# Patient Record
Sex: MALE | Race: WHITE | NOT HISPANIC OR LATINO | Employment: FULL TIME | URBAN - METROPOLITAN AREA
[De-identification: names, ages, dates, MRNs, and addresses within clinical notes are randomized per-mention and may not be internally consistent; named-entity substitution may affect disease eponyms.]

---

## 2023-02-16 PROBLEM — J34.2 NASAL SEPTAL DEVIATION: Status: ACTIVE | Noted: 2023-02-16

## 2023-02-16 PROBLEM — K59.09 CHRONIC CONSTIPATION: Status: ACTIVE | Noted: 2023-02-16

## 2023-02-16 PROBLEM — F33.0 MILD EPISODE OF RECURRENT MAJOR DEPRESSIVE DISORDER (CMS-HCC): Status: ACTIVE | Noted: 2023-02-16

## 2023-02-16 PROBLEM — E53.8 VITAMIN B12 DEFICIENCY: Status: ACTIVE | Noted: 2023-02-16

## 2023-02-16 PROBLEM — R07.89 ATYPICAL CHEST PAIN: Status: ACTIVE | Noted: 2023-02-16

## 2023-02-16 PROBLEM — R39.14 BENIGN PROSTATIC HYPERPLASIA WITH INCOMPLETE BLADDER EMPTYING: Status: ACTIVE | Noted: 2023-02-16

## 2023-02-16 PROBLEM — M25.561 RIGHT KNEE PAIN: Status: ACTIVE | Noted: 2023-02-16

## 2023-02-16 PROBLEM — G47.31 COMPLEX SLEEP APNEA SYNDROME: Status: ACTIVE | Noted: 2023-02-16

## 2023-02-16 PROBLEM — I10 BENIGN ESSENTIAL HYPERTENSION: Status: ACTIVE | Noted: 2023-02-16

## 2023-02-16 PROBLEM — I25.10 CORONARY ARTERY CALCIFICATION SEEN ON CAT SCAN: Status: ACTIVE | Noted: 2023-02-16

## 2023-02-16 PROBLEM — I71.21 ANEURYSM OF ASCENDING AORTA (CMS-HCC): Status: ACTIVE | Noted: 2023-02-16

## 2023-02-16 PROBLEM — G47.39 COMPLEX SLEEP APNEA SYNDROME: Status: ACTIVE | Noted: 2023-02-16

## 2023-02-16 PROBLEM — G47.33 OBSTRUCTIVE SLEEP APNEA: Status: ACTIVE | Noted: 2023-02-16

## 2023-02-16 PROBLEM — K21.9 ESOPHAGEAL REFLUX: Status: ACTIVE | Noted: 2023-02-16

## 2023-02-16 PROBLEM — F41.1 GENERALIZED ANXIETY DISORDER: Status: ACTIVE | Noted: 2023-02-16

## 2023-02-16 PROBLEM — J34.89 NASAL DRYNESS: Status: ACTIVE | Noted: 2023-02-16

## 2023-02-16 PROBLEM — R93.89 ABNORMAL CHEST XRAY: Status: ACTIVE | Noted: 2023-02-16

## 2023-02-16 PROBLEM — M95.4 ACQUIRED DEFORMITY OF CHEST AND RIB: Status: ACTIVE | Noted: 2023-02-16

## 2023-02-16 PROBLEM — J30.9 ALLERGIC RHINITIS: Status: ACTIVE | Noted: 2023-02-16

## 2023-02-16 PROBLEM — K13.79 UVULAR SWELLING: Status: ACTIVE | Noted: 2023-02-16

## 2023-02-16 PROBLEM — E55.9 VITAMIN D DEFICIENCY: Status: ACTIVE | Noted: 2023-02-16

## 2023-02-16 PROBLEM — E78.00 ELEVATED LDL CHOLESTEROL LEVEL: Status: ACTIVE | Noted: 2023-02-16

## 2023-02-16 PROBLEM — E03.9 HYPOTHYROIDISM: Status: ACTIVE | Noted: 2023-02-16

## 2023-02-16 PROBLEM — Z77.090 ASBESTOS EXPOSURE: Status: ACTIVE | Noted: 2023-02-16

## 2023-02-16 PROBLEM — R00.2 PALPITATIONS: Status: ACTIVE | Noted: 2023-02-16

## 2023-02-16 PROBLEM — T88.7XXA MEDICATION SIDE EFFECT: Status: ACTIVE | Noted: 2023-02-16

## 2023-02-16 PROBLEM — N40.1 BENIGN PROSTATIC HYPERPLASIA WITH INCOMPLETE BLADDER EMPTYING: Status: ACTIVE | Noted: 2023-02-16

## 2023-02-16 PROBLEM — R91.1 INCIDENTAL LUNG NODULE, > 3MM AND < 8MM: Status: ACTIVE | Noted: 2023-02-16

## 2023-02-16 PROBLEM — K76.0 FATTY LIVER: Status: ACTIVE | Noted: 2023-02-16

## 2023-02-16 PROBLEM — R06.02 SOBOE (SHORTNESS OF BREATH ON EXERTION): Status: ACTIVE | Noted: 2023-02-16

## 2023-02-16 PROBLEM — E66.3 OVERWEIGHT: Status: ACTIVE | Noted: 2023-02-16

## 2023-02-16 PROBLEM — K82.4 GALLBLADDER POLYP: Status: ACTIVE | Noted: 2023-02-16

## 2023-02-16 PROBLEM — J45.909 REACTIVE AIRWAY DISEASE (HHS-HCC): Status: ACTIVE | Noted: 2023-02-16

## 2023-02-16 PROBLEM — M25.861 MASS OF JOINT OF RIGHT KNEE: Status: ACTIVE | Noted: 2023-02-16

## 2023-02-16 PROBLEM — R53.83 FATIGUE: Status: ACTIVE | Noted: 2023-02-16

## 2023-02-16 PROBLEM — Z15.89 HOMOZYGOUS MTHFR MUTATION C677T: Status: ACTIVE | Noted: 2023-02-16

## 2023-02-16 PROBLEM — I49.1 PAC (PREMATURE ATRIAL CONTRACTION): Status: ACTIVE | Noted: 2023-02-16

## 2023-02-16 PROBLEM — M23.92 LOCKING OF LEFT KNEE: Status: ACTIVE | Noted: 2023-02-16

## 2023-02-16 PROBLEM — J32.4 CHRONIC PANSINUSITIS: Status: ACTIVE | Noted: 2023-02-16

## 2023-02-16 RX ORDER — ESOMEPRAZOLE MAGNESIUM 40 MG/1
40 CAPSULE, DELAYED RELEASE ORAL DAILY PRN
COMMUNITY
End: 2023-07-26 | Stop reason: SDUPTHER

## 2023-02-16 RX ORDER — AZELASTINE 1 MG/ML
2 SPRAY, METERED NASAL DAILY
COMMUNITY
Start: 2012-02-23 | End: 2023-06-19 | Stop reason: SDUPTHER

## 2023-02-16 RX ORDER — LANOLIN ALCOHOL/MO/W.PET/CERES
1 CREAM (GRAM) TOPICAL DAILY
COMMUNITY
Start: 2012-11-21

## 2023-02-16 RX ORDER — HYDROCORTISONE ACETATE 25 MG/1
SUPPOSITORY RECTAL
COMMUNITY
Start: 2012-09-11 | End: 2023-03-29 | Stop reason: SDUPTHER

## 2023-02-16 RX ORDER — TALC
250 POWDER (GRAM) TOPICAL 2 TIMES DAILY
COMMUNITY
Start: 2021-05-04 | End: 2023-03-13 | Stop reason: SDUPTHER

## 2023-02-16 RX ORDER — POLYETHYLENE GLYCOL 3350 17 G/17G
POWDER, FOR SOLUTION ORAL
COMMUNITY
Start: 2021-05-04

## 2023-02-16 RX ORDER — BUPROPION HYDROCHLORIDE 150 MG/1
150 TABLET ORAL
COMMUNITY
Start: 2021-08-10 | End: 2023-06-30 | Stop reason: SDUPTHER

## 2023-02-16 RX ORDER — MULTIVITAMIN
1 TABLET ORAL DAILY
COMMUNITY
Start: 2012-05-11

## 2023-02-16 RX ORDER — FERROUS SULFATE 325(65) MG
TABLET ORAL
COMMUNITY
Start: 2020-02-05

## 2023-02-16 RX ORDER — LEVOTHYROXINE SODIUM 75 UG/1
1 TABLET ORAL DAILY
COMMUNITY
Start: 2012-01-05 | End: 2023-12-11 | Stop reason: SDUPTHER

## 2023-02-16 RX ORDER — ROSUVASTATIN CALCIUM 10 MG/1
1 TABLET, COATED ORAL DAILY
COMMUNITY
Start: 2022-07-26 | End: 2023-11-28 | Stop reason: SDUPTHER

## 2023-02-16 RX ORDER — ESCITALOPRAM OXALATE 10 MG/1
5 TABLET ORAL DAILY
COMMUNITY
Start: 2020-12-28 | End: 2023-03-29 | Stop reason: SDUPTHER

## 2023-02-16 RX ORDER — UBIQUINOL 100 MG
2 CAPSULE ORAL DAILY
COMMUNITY

## 2023-02-16 RX ORDER — RAMIPRIL 10 MG/1
1 CAPSULE ORAL DAILY
COMMUNITY
Start: 2022-06-02 | End: 2023-03-13 | Stop reason: SDUPTHER

## 2023-02-16 RX ORDER — ACETAMINOPHEN 500 MG
50 TABLET ORAL DAILY
COMMUNITY
Start: 2012-11-21

## 2023-02-16 RX ORDER — TAMSULOSIN HYDROCHLORIDE 0.4 MG/1
0.4 CAPSULE ORAL 2 TIMES DAILY
COMMUNITY
End: 2023-06-09 | Stop reason: SDUPTHER

## 2023-02-16 RX ORDER — ALBUTEROL SULFATE 90 UG/1
AEROSOL, METERED RESPIRATORY (INHALATION)
COMMUNITY
Start: 2015-09-24 | End: 2023-06-30 | Stop reason: SDUPTHER

## 2023-02-16 RX ORDER — LORAZEPAM 0.5 MG/1
0.5 TABLET ORAL
COMMUNITY
Start: 2018-12-10

## 2023-02-17 PROBLEM — K46.9 HERNIA, ABDOMINAL: Status: ACTIVE | Noted: 2023-02-17

## 2023-02-17 PROBLEM — D12.6 TUBULAR ADENOMA OF COLON: Status: ACTIVE | Noted: 2023-02-17

## 2023-02-17 PROBLEM — R10.11 COLICKY RUQ ABDOMINAL PAIN: Status: ACTIVE | Noted: 2023-02-17

## 2023-02-17 PROBLEM — E66.811 CLASS 1 OBESITY WITH ALVEOLAR HYPOVENTILATION AND BODY MASS INDEX (BMI) OF 31.0 TO 31.9 IN ADULT: Status: ACTIVE | Noted: 2023-02-17

## 2023-02-17 PROBLEM — E66.2 CLASS 1 OBESITY WITH ALVEOLAR HYPOVENTILATION AND BODY MASS INDEX (BMI) OF 31.0 TO 31.9 IN ADULT (MULTI): Status: ACTIVE | Noted: 2023-02-17

## 2023-02-17 PROBLEM — Z86.69 HISTORY OF MIGRAINE HEADACHES: Status: ACTIVE | Noted: 2023-02-17

## 2023-02-17 PROBLEM — K64.9 BLEEDING HEMORRHOIDS: Status: ACTIVE | Noted: 2023-02-17

## 2023-02-17 PROBLEM — Z86.16 HISTORY OF SEVERE ACUTE RESPIRATORY SYNDROME CORONAVIRUS 2 (SARS-COV-2) DISEASE: Status: ACTIVE | Noted: 2023-02-17

## 2023-02-17 PROBLEM — F10.21 HISTORY OF ALCOHOL DEPENDENCE (MULTI): Status: ACTIVE | Noted: 2023-02-17

## 2023-03-01 LAB
ALANINE AMINOTRANSFERASE (SGPT) (U/L) IN SER/PLAS: 27 U/L (ref 10–52)
ASPARTATE AMINOTRANSFERASE (SGOT) (U/L) IN SER/PLAS: 21 U/L (ref 9–39)
CHOLESTEROL (MG/DL) IN SER/PLAS: 230 MG/DL (ref 0–199)
CHOLESTEROL IN HDL (MG/DL) IN SER/PLAS: 76.6 MG/DL
CHOLESTEROL/HDL RATIO: 3
LDL: 131 MG/DL (ref 0–99)
TRIGLYCERIDE (MG/DL) IN SER/PLAS: 111 MG/DL (ref 0–149)
VLDL: 22 MG/DL (ref 0–40)

## 2023-03-13 DIAGNOSIS — R00.2 PALPITATIONS: ICD-10-CM

## 2023-03-13 DIAGNOSIS — G47.33 OBSTRUCTIVE SLEEP APNEA: ICD-10-CM

## 2023-03-13 RX ORDER — PRAVASTATIN SODIUM 40 MG/1
1 TABLET ORAL DAILY
COMMUNITY
Start: 2022-09-12 | End: 2023-03-29

## 2023-03-13 RX ORDER — RAMIPRIL 10 MG/1
10 CAPSULE ORAL DAILY
Qty: 90 CAPSULE | Refills: 0 | Status: SHIPPED | OUTPATIENT
Start: 2023-03-13 | End: 2023-07-26 | Stop reason: ALTCHOICE

## 2023-03-13 RX ORDER — TALC
250 POWDER (GRAM) TOPICAL 2 TIMES DAILY
Qty: 90 TABLET | Refills: 0 | Status: SHIPPED | OUTPATIENT
Start: 2023-03-13 | End: 2023-06-19 | Stop reason: SDUPTHER

## 2023-03-13 RX ORDER — FLUTICASONE PROPIONATE 50 MCG
SPRAY, SUSPENSION (ML) NASAL
COMMUNITY
Start: 2023-02-22 | End: 2023-06-19 | Stop reason: SDUPTHER

## 2023-03-29 ENCOUNTER — OFFICE VISIT (OUTPATIENT)
Dept: PRIMARY CARE | Facility: CLINIC | Age: 63
End: 2023-03-29
Payer: COMMERCIAL

## 2023-03-29 VITALS
DIASTOLIC BLOOD PRESSURE: 72 MMHG | SYSTOLIC BLOOD PRESSURE: 118 MMHG | BODY MASS INDEX: 32.2 KG/M2 | WEIGHT: 243 LBS | HEIGHT: 73 IN | HEART RATE: 68 BPM | RESPIRATION RATE: 18 BRPM

## 2023-03-29 DIAGNOSIS — Z79.899 NEW MEDICATION ADDED: ICD-10-CM

## 2023-03-29 DIAGNOSIS — K64.9 BLEEDING HEMORRHOIDS: ICD-10-CM

## 2023-03-29 DIAGNOSIS — J45.30 MILD PERSISTENT REACTIVE AIRWAY DISEASE WITHOUT COMPLICATION (HHS-HCC): Primary | ICD-10-CM

## 2023-03-29 DIAGNOSIS — E78.00 ELEVATED LDL CHOLESTEROL LEVEL: ICD-10-CM

## 2023-03-29 DIAGNOSIS — F33.0 MILD EPISODE OF RECURRENT MAJOR DEPRESSIVE DISORDER (CMS-HCC): ICD-10-CM

## 2023-03-29 DIAGNOSIS — I71.21 ANEURYSM OF ASCENDING AORTA WITHOUT RUPTURE (CMS-HCC): ICD-10-CM

## 2023-03-29 DIAGNOSIS — E78.00 HYPERCHOLESTEROLEMIA: ICD-10-CM

## 2023-03-29 DIAGNOSIS — Z91.148 NOT TAKING MEDICATION AS DIRECTED: ICD-10-CM

## 2023-03-29 DIAGNOSIS — I25.10 CORONARY ARTERY CALCIFICATION SEEN ON CAT SCAN: ICD-10-CM

## 2023-03-29 PROCEDURE — 3074F SYST BP LT 130 MM HG: CPT | Performed by: INTERNAL MEDICINE

## 2023-03-29 PROCEDURE — 3078F DIAST BP <80 MM HG: CPT | Performed by: INTERNAL MEDICINE

## 2023-03-29 PROCEDURE — 1036F TOBACCO NON-USER: CPT | Performed by: INTERNAL MEDICINE

## 2023-03-29 PROCEDURE — 99215 OFFICE O/P EST HI 40 MIN: CPT | Performed by: INTERNAL MEDICINE

## 2023-03-29 PROCEDURE — 94664 DEMO&/EVAL PT USE INHALER: CPT | Performed by: INTERNAL MEDICINE

## 2023-03-29 RX ORDER — FLUTICASONE PROPIONATE 50 UG/1
1 POWDER, METERED RESPIRATORY (INHALATION)
Qty: 60 EACH | Refills: 2 | Status: SHIPPED | OUTPATIENT
Start: 2023-03-29 | End: 2023-07-26 | Stop reason: SDUPTHER

## 2023-03-29 RX ORDER — ESCITALOPRAM OXALATE 5 MG/1
5 TABLET ORAL DAILY
COMMUNITY
End: 2023-06-30 | Stop reason: SDUPTHER

## 2023-03-29 RX ORDER — HYDROCORTISONE ACETATE 25 MG/1
25 SUPPOSITORY RECTAL 2 TIMES DAILY PRN
Qty: 12 SUPPOSITORY | Refills: 1 | Status: SHIPPED | OUTPATIENT
Start: 2023-03-29

## 2023-03-29 ASSESSMENT — PAIN SCALES - GENERAL: PAINLEVEL: 0-NO PAIN

## 2023-03-29 NOTE — PROGRESS NOTES
Subjective   Patient ID: Livan Wen is a 63 y.o. male who presents for Follow-up (Pt here for follow up and refill on suppositories).  LAST VISIT PLAN 3/1/23  Patient Discussion/Summary  MEDICATIONS:  azithromycin: 2 tabs today and then 1 tab daily for 4 days.  benzonatate one every 6-8 hours as needed for cough.  inhaler if you keep coughing, albuterol, follow directions on box  claritin 10mg daily to help settle down the dizziness-be careful about driving and do not get on any ladders until dizziness and illness are resolved  TESTING:today  take antibiotic and cough med and claritin this am -see how you feel and if not dizzy and feeling ok this afternoon you could go to work, otherwise stay home today.  FOLLOW UP WITH DR. ZAMUDIO:  Next visit:keep appt end of the month   Provider Impressions  URI WITH DRY COUGH AND NOW SECONDARY RIGHT OTITIS MEDIA-  SEE PLAN  IF COUGH DOES NOT IMPROVE INTO NEXT WEEK OR WORSENS CONSIDER RE DOSE WITH MEDROL   CAN USE INHALER FROUR COUGH IF NEEDED-   Elevated LDL cholesterol level LABS TODAY  HTN STABLE  UNABLE TO FIND UC NOTE IN [facility] OR EPIC OR COMMUNITY  END INFO FROM PRIOR VISIT    HPI  This appt is originally to follow up on inc rosuvastatin, and labs done 3-1 chol not at goal and inc from prior. It seems he is not taking the rosuvastatin and is still taking pravastatin. Has CAC so want stain on board with ldl at 70 is goal, or below 70.  Also here to f/up on asthmatic bronchitis.    He is feeling well and the bronchitis is resolved.  Some stress at work. Recently  and some better after dealing  with boss. And addressing issues. Company was [place] [employer] and now is [place] [employer].    Cardiac: No new cp. Has his chronic cp that is unchanged,denies  palpitations,increased abebe  Resp: No sob,  wheezing, STILL COUGHING AT NIGHT. AND BRINGS SOME STUFF UP, HE THINKS IT IS HIS CPAP NEEDING CLEANED MORE OFTN.  Extremities: No leg or ankle swelling, no claudication  Neuro-No dizziness,  syncope, blurred vision. No new headaches.    PFTS SMALL AIRWAYS REACTIVE. July 2022. Would  AVOID singulair with depression hx. Consider steroid inhaler. Will start flovent disc, demo'd with him how to use. Then continue albuterol for bronchodilator. Follow up    After discussion, it appears he is taking pravastatin and not the new rosuvastati which may be why no improvement in lipids.He called his spouse from the room to ask.    Mood is fine he states.    He requests refill of his hemorrhoid suppositories, is not having bleeding he states, is more irritation periodically and these help. He is current in his colonoscopy.    Review of Systems    Objective   Lab Results   Component Value Date    WBC 8.5 10/08/2022    HGB 14.4 10/08/2022    HCT 43.2 10/08/2022     10/08/2022    CHOL 230 (H) 03/01/2023    TRIG 111 03/01/2023    HDL 76.6 03/01/2023    ALT 27 03/01/2023    AST 21 03/01/2023     10/08/2022    K 4.3 10/08/2022     (H) 10/08/2022    CREATININE 1.01 10/08/2022    BUN 16 10/08/2022    CO2 28 10/08/2022    TSH 2.59 10/08/2022    PSA 0.44 10/08/2022        LDL  Lipid Panel  Collected: 03/01/23 0838   Result status: Final   Resulting lab: [facility]   Reference range: 0 - 99 mg/dL   Value: 131 High    Comment: .                           NEAR      BORD      AGE      DESIRABLE  OPTIMAL    HIGH     HIGH     VERY HIGH     0-19 Y     0 - 109     ---    110-129   >/= 130     ----    20-24 Y     0 - 119     ---    120-159   >/= 160     ----      >24 Y     0 -  99   100-129  130-159   160-189     >/=190  .   *Additional information available - comment      Contains abnormal data Lipid Panel  Order: [id number]  Status: Final result       Visible to patient: No (inaccessible in [facility])    0 Result Notes       1  Topic             Component Ref Range & Units 4 wk ago  (3/1/23) 5 mo ago  (10/8/22) 1 yr ago  (5/4/21) 2 yr ago  (12/28/20) 3 yr ago  (12/30/19) 3 yr ago  (9/3/19) 4 yr ago  (7/28/18)    Cholesterol 0 - 199 mg/dL 230 High  153   High    High   CM   Comment: .      AGE      DESIRABLE   BORDERLINE HIGH   HIGH     0-19 Y     0 - 169       170 - 199     >/= 200    20-24 Y     0 - 189       190 - 224     >/= 225        >24 Y     0 - 199       200 - 239     >/= 240   **All ranges are based on fasting samples. Specific   therapeutic targets will vary based on patient-specific   cardiac risk.  .   Pediatric guidelines reference:Pediatrics 2011, 128(S5).   Adult guidelines reference: NCEP ATPIII Guidelines,    MARA 2001, 258:2486-97  .   Venipuncture immediately after or during the   administration of Metamizole may lead to falsely   low results. Testing should be performed immediately   prior to Metamizole dosing.   HDL mg/dL 76.6 43.6 CM 56.1 CM 73.6 CM 62.1 CM 57.6 CM 71.4 CM   Comment: .      AGE      VERY LOW   LOW     NORMAL    HIGH       0-19 Y       < 35   < 40     40-45     ----    20-24 Y       ----   < 40       >45     ----      >24 Y       ----   < 40     40-60      >60  .   Cholesterol/HDL Ratio  3.0 3.5 CM 3.5 CM 3.2 CM 2.9 CM 4.1 CM 2.7 CM   Comment: REF VALUES  DESIRABLE  < 3.4  HIGH RISK  > 5.0   LDL 0 - 99 mg/dL 131 High  92  High   High   High   High   High  CM   Comment: .                           NEAR      BORD      AGE      DESIRABLE  OPTIMAL    HIGH     HIGH     VERY HIGH     0-19 Y     0 - 109     ---    110-129   >/= 130     ----    20-24 Y     0 - 119     ---    120-159   >/= 160     ----      >24 Y     0 -  99   100-129  130-159   160-189     >/=190  .   VLDL 0 - 40 mg/dL 22 18 42 High  14 15 22 16   Triglycerides 0 - 149 mg/dL 111 89  High  CM 68 CM 74  CM 79 CM   Comment: .      AGE      DESIRABLE   BORDERLINE HIGH   HIGH     VERY HIGH   0 D-90 D    19 - 174         ----         ----        ----  91 D- 9 Y     0 -  74        75 -  99     >/= 100      ----    10-19 Y     0 -  89        90 - 129     >/= 130   "    ----    20-24 Y     0 - 114       115 - 149     >/= 150      ----        >24 Y     0 - 149       150 - 199    200- 499    >/= 500  .   Venipuncture immediately after or during the   administration of Metamizole may lead to falsely   low results. Testing should be performed immediately   prior to Metamizole dosing.   [facility]  [facility]                  par  Physical ExamBP 118/72 (BP Location: Right arm, Patient Position: Sitting, BP Cuff Size: Adult)   Pulse 68   Resp 18   Ht 1.854 m (6' 1\")   Wt 110 kg (243 lb)   BMI 32.06 kg/m²       6/2/2022     3:41 PM 7/26/2022     3:02 PM 7/26/2022     3:03 PM 8/8/2022    11:40 AM 10/18/2022     9:25 AM 11/30/2022     8:12 AM 3/29/2023     8:47 AM   Vitals   Systolic 131 112 115 129 124 108 118   Diastolic 87 77 84 94 80 74 72   Heart Rate 76 75  83 72 72 68   Temp    35.9 °C (96.7 °F)      Resp    18 18 18 18   Height (in)  1.854 m (6' 1\")  1.854 m (6' 1\") 1.842 m (6' 0.5\")  1.854 m (6' 1\")   Weight (lb)  241.02  243.17 234 242 243   BMI  31.8 kg/m2  32.08 kg/m2 31.3 kg/m2 32.37 kg/m2 32.06 kg/m2   BSA (m2)  2.37 m2  2.38 m2 2.33 m2 2.37 m2 2.38 m2   Visit Report       Report       Patient looks well and is in no obvious distress.  HEENT:   Normocephalic, no facial asymmetry  Eyes: Sclera and conjunctiva are clear.  Neck: No adenopathy cervical (Ant/post/lat), no Supraclavicular nodes.   Thyroid normal.   Carotid pulses normal, no carotid bruits.  Lungs : RR normal. Clear to auscultation anterior, posterior and lateral. No rales, wheezes rhonchi or rubs. Good air exchange. Had a cough at end expiration.  Heart: RRR. No Murmur, gallop, click or rub.  Extremities: no upper extremity edema. No lower extremity edema.   Musculoskeletal: no synovitis of joints seen. No new deformity.  Neuro: CN 2-12 intact. Alert, appropriate.   Ambulates independently.  No gross motor deficit.   No tremors.  Psych: normal mood and affect.  Skin: No " rash, bruising petechiae or jaundice.      Assessment/Plan   Problem List Items Addressed This Visit          Respiratory    Reactive airway disease - Primary    Relevant Medications    fluticasone (Flovent Diskus) 50 mcg/actuation diskus inhaler-pt instructed in use by me. Continue albuterol mdi as well.       Circulatory    Coronary artery calcification seen on CAT scan-change statin to get ldl below 70, asymp.    Bleeding hemorrhoids    Relevant Medications    hydrocortisone (Anusol-HC) 25 mg suppository       Other    Hypercholesterolemia    Relevant Orders    Lipid Panel    Aspartate Aminotransferase    Alanine Aminotransferase    Mild episode of recurrent major depressive disorder (CMS/HCC)-stable on meds, continue same and follow up    Not taking medication as directed--re confusion on statin and which one taking.   Aneurysm ascending aorta, has appt. With dr vargas and will discuss with her/defer to her about changed ace inh to arb. D/w pt.

## 2023-03-29 NOTE — PATIENT INSTRUCTIONS
You are not to be taking pravastatin.  We changed it to rosuvastatin 10 mg once per day.  Your cholesterol is too high, but perhaps this is because you are not taking the rosuvastatin.  Please start the rosuvastatin 10 mg once per day and get a lipid panel in 3 months.    Cough /reactive airways disease: Flovent disc: one inhalation twice per day, rinse mouth after use.  Use the albuterol inhaler that you are currently using twice per day or as needed-depends on how much you cough.  Stay on the Flovent for 3 months and then will plan to stop and see if you need it any longer.    Follow up July-fasting blood test right before that appt. To check on rosuvastatin.    Discuss with Dr Cota about if she would suggest changing ramipril to a different bp med to help protect aorta over time.

## 2023-04-02 PROBLEM — Z91.148 NOT TAKING MEDICATION AS DIRECTED: Status: ACTIVE | Noted: 2023-04-02

## 2023-04-24 ENCOUNTER — TELEPHONE (OUTPATIENT)
Dept: PRIMARY CARE | Facility: CLINIC | Age: 63
End: 2023-04-24
Payer: COMMERCIAL

## 2023-04-24 NOTE — TELEPHONE ENCOUNTER
----- Message from Sonia Nguyễn MD sent at 4/21/2023  6:41 PM EDT -----  Let Livan know the lung nodule was stable. His aneurysm is there (thoracic ) and stable at 4.5 cm. He does not need further testing on the lung nodule. He does need the aneurysm followed but he knows this.

## 2023-06-09 DIAGNOSIS — R39.14 BENIGN PROSTATIC HYPERPLASIA WITH INCOMPLETE BLADDER EMPTYING: Primary | ICD-10-CM

## 2023-06-09 DIAGNOSIS — N40.1 BENIGN PROSTATIC HYPERPLASIA WITH INCOMPLETE BLADDER EMPTYING: Primary | ICD-10-CM

## 2023-06-09 RX ORDER — TAMSULOSIN HYDROCHLORIDE 0.4 MG/1
0.4 CAPSULE ORAL 2 TIMES DAILY
Qty: 180 CAPSULE | Refills: 1 | Status: SHIPPED | OUTPATIENT
Start: 2023-06-09 | End: 2024-02-21 | Stop reason: SDUPTHER

## 2023-06-19 DIAGNOSIS — G47.33 OBSTRUCTIVE SLEEP APNEA: ICD-10-CM

## 2023-06-19 RX ORDER — TALC
250 POWDER (GRAM) TOPICAL 2 TIMES DAILY
Qty: 180 TABLET | Refills: 0 | Status: SHIPPED | OUTPATIENT
Start: 2023-06-19 | End: 2024-01-09 | Stop reason: SDUPTHER

## 2023-06-19 RX ORDER — AZELASTINE 1 MG/ML
2 SPRAY, METERED NASAL 2 TIMES DAILY
Qty: 30 ML | Refills: 1 | Status: SHIPPED | OUTPATIENT
Start: 2023-06-19

## 2023-06-19 RX ORDER — FLUTICASONE PROPIONATE 50 MCG
2 SPRAY, SUSPENSION (ML) NASAL DAILY
Qty: 48 G | Refills: 1 | Status: SHIPPED | OUTPATIENT
Start: 2023-06-19 | End: 2023-12-11 | Stop reason: SDUPTHER

## 2023-06-30 DIAGNOSIS — R06.02 SOBOE (SHORTNESS OF BREATH ON EXERTION): ICD-10-CM

## 2023-06-30 DIAGNOSIS — F33.0 MILD EPISODE OF RECURRENT MAJOR DEPRESSIVE DISORDER (CMS-HCC): ICD-10-CM

## 2023-06-30 RX ORDER — ALBUTEROL SULFATE 90 UG/1
AEROSOL, METERED RESPIRATORY (INHALATION)
Qty: 18 G | Refills: 3 | Status: SHIPPED | OUTPATIENT
Start: 2023-06-30 | End: 2024-03-18 | Stop reason: SDUPTHER

## 2023-06-30 RX ORDER — ESCITALOPRAM OXALATE 5 MG/1
5 TABLET ORAL DAILY
Qty: 90 TABLET | Refills: 0 | Status: SHIPPED | OUTPATIENT
Start: 2023-06-30 | End: 2023-07-26 | Stop reason: SDUPTHER

## 2023-06-30 RX ORDER — BUPROPION HYDROCHLORIDE 150 MG/1
150 TABLET ORAL EVERY MORNING
Qty: 90 TABLET | Refills: 0 | Status: SHIPPED | OUTPATIENT
Start: 2023-06-30 | End: 2023-07-26 | Stop reason: SDUPTHER

## 2023-06-30 NOTE — TELEPHONE ENCOUNTER
Message from [employer].    Med refill:    Esitalopram 5 mg 1 tab daily    Bupropion 150 mg 1 tab daily with food in the am    Albuterol 90 mcg/actuation inhaler inhale 1-2 puffs every 4-6 hrs as needed    DEVANTE CIFUENTES

## 2023-07-20 NOTE — PROGRESS NOTES
Subjective   Patient ID: Livan Wen is a 63 y.o. male who presents for Follow-up (Patient is here for cholesterol, thyroid, and HTN management. Patient also states he is having problems swallowing. He also has numbness of both hands when sleeping and numbness of the left hand when driving./BN/).  LAST VISIT PLAN 3/29/2023  PATIENT'S DISCUSSION/SUMMARY:  You are not to be taking pravastatin.  We changed it to rosuvastatin 10 mg once per day.  Your cholesterol is too high, but perhaps this is because you are not taking the rosuvastatin.  Please start the rosuvastatin 10 mg once per day and get a lipid panel in 3 months.  Cough /reactive airways disease: Flovent disc: one inhalation twice per day, rinse mouth after use.  Use the albuterol inhaler that you are currently using twice per day or as needed-depends on how much you cough.  Stay on the Flovent for 3 months and then will plan to stop and see if you need it any longer.  Follow up July-fasting blood test right before that appt. To check on rosuvastatin.  Discuss with Dr Cota about if she would suggest changing ramipril to a different bp med to help protect aorta over time.      PROVIDER'S IMPRESSIONS:  Mild persistent reactive airway disease without complication  Bleeding hemorrhoids  Coronary artery calcification seen on CAT scan  Hypercholesterolemia  Elevated LDL cholesterol level  Not taking medication as directed  Mild episode of recurrent major depressive disorder (CMS/HCC)  New medication added  Aneurysm of ascending aorta without rupture  Orders Placed  Alanine Aminotransferase  Aspartate Aminotransferase  Lipid Panel  Follow Up In [facility] - [facility]  Medication Changes   escitalopram oxalate 5 mg oral Daily   luticasone propionate  50 mcg/actuation spray,suspension, USE 1 SPRAY IN EACH NOSTRIL EVERY 6 HOURS  50 mcg/actuation blister with device, 1 puff inhalation 2 times daily RT, Rinse mouth with water after use to reduce aftertaste and  "incidence of candidiasis. Do not swallow.       hydrocortisone acetate 25 mg rectal 2 times daily PRN, Insert 1 suppository rectally at bedtime for 7-12 days   END INFO FROM PRIOR VISIT    HPI  Livan is here to follow-up on hypertension reactive airways disease/asthma, dilated thoracic aorta, hyperlipidemia and hypercholesterolemia, follow-up on depression.  At the last visit he was not taking his rosuvastatin, and this was restarted and he was to have labs prior to this visit.  Asthma, reactive airways:We put him on Flovent regularly, because of recurrent flares of wheezing, we are following up today.  He has the Flovent discus.his lungs have been good on this med and he is still using it. 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     states he is having problems swallowing.   And numbness in his hands.    Swallowing:Has gerd, had moderate stricture dilated for similar symptoms October 2019 and that was last egd. Colonoscopy was then as well. Dr hackett. Will refer back. Will increase nexium in the meantime to bid. Another option is change to dexilant but will try bid nexium first as he has this at home. He notes he takes this regularly.    He also has numbness of both hands when sleeping and numbness of the left hand when driving.    Htn: I had him discuss with Dr. Childress, his aneurysm specialist, whether or not he should change ramipril to an ARB and she did change him to losartan 100 mg daily. His bp's are sometimes high at home and shows me a pic of normal and one of 150/80's  \" I feel like I have high blood pressure\"  Meaning he just does not feel good he states.like in my neck and head.he always feels his heart beat in his sinuses.    Sleep -using cpap -feels like something flops closed even with cpap and wakes up several times per night. This is  usually happening because he ended up on his back. He starts out on his side. Uses pillows to try " "and keep self on his side.    Hands/fingers: Numbness off and on x 3 years. But now every night and with riding bicycle-they have been doing more bike riding.  He is not sure if he ever had nct   Symptoms are sometimes mid 3 fingers lef,t sometimes fingers 345 sometimes thumb and 23 fingers left.  Tight trap muscles    Review of Systems  No new cp  No palpitations  Riding bicycle and breathing is doing better  If mows, uses inhaler before he goes  And is keep up with the flovent.  We avoid singulair due to depression  Mood is good, not feeling depressed.    Objective   Lab Results   Component Value Date    WBC 8.5 10/08/2022    HGB 14.4 10/08/2022    HCT 43.2 10/08/2022     10/08/2022    CHOL 230 (H) 03/01/2023    TRIG 111 03/01/2023    HDL 76.6 03/01/2023    ALT 27 03/01/2023    AST 21 03/01/2023     10/08/2022    K 4.3 10/08/2022     (H) 10/08/2022    CREATININE 1.01 10/08/2022    BUN 16 10/08/2022    CO2 28 10/08/2022    TSH 2.59 10/08/2022    PSA 0.44 10/08/2022     Physical ExamBP 109/86 (BP Location: Left arm, Patient Position: Sitting)   Pulse 79   Ht 1.842 m (6' 0.5\")   Wt 108 kg (237 lb 6.4 oz)   SpO2 94%   BMI 31.75 kg/m²       10/18/2022     9:25 AM 11/30/2022     8:12 AM 3/1/2023     7:41 AM 3/1/2023     8:05 AM 3/29/2023     8:47 AM 5/2/2023     3:39 PM 7/26/2023     8:22 AM   Vitals   Systolic 124 108 96 114 118 123 109   Diastolic 80 74 70 82 72 85 86   Heart Rate 72 72 64  68 80 79   Temp    36.6 °C (97.8 °F)      Resp 18 18 16  18     Height (in) 1.842 m (6' 0.5\")    1.854 m (6' 1\") 1.842 m (6' 0.5\") 1.842 m (6' 0.5\")   Weight (lb) 234 242 242  243 245.13 237.4   BMI 31.3 kg/m2 32.37 kg/m2 32.37 kg/m2  32.06 kg/m2 32.79 kg/m2 31.75 kg/m2   BSA (m2) 2.33 m2 2.37 m2 2.37 m2  2.38 m2 2.38 m2 2.35 m2   Visit Report     Report  Report       Patient looks well and is in no obvious distress.  HEENT:   Normocephalic, no facial asymmetry  Eyes: Sclera and conjunctiva are " clear.  Oropharynx, normal appearing uvula, it is not long enough to cause obstruction.  Palate moves well.  Neck: No adenopathy cervical (Ant/post/lat), no Supraclavicular nodes.   Thyroid normal.  Carotid pulses normal, no carotid bruits.  Lungs : RR normal. Clear to auscultation anterior, posterior and lateral. No rales, wheezes rhonchi or rubs. Good air exchange.  Heart: RRR. No Murmur, gallop, click or rub.  Abdomen: Bowel sounds normal, No bruits. No pulsatile mass. No hepatosplenomegaly, masses or tenderness. Soft, no guarding.  Vascular:  Posterior tibialis and dorsalis pedis pulses within normal limits bilaterally.   Extremities: no upper extremity edema. No lower extremity edema.   Musculoskeletal: no synovitis of joints seen. No new deformity.  Neuro: CN 2-12 intact. Alert, appropriate.  Ambulates independently.  No gross motor deficit.   No tremors.  Psych: normal mood and affect.  Skin: No rash, bruising petechiae or jaundice.  Pos tinels phalens r phalnes, left more than right, radial pusles normal  Tight trap muscles bilaterally upper back.     Assessment/Plan   Problem List Items Addressed This Visit       Aneurysm of ascending aorta (CMS/HCC)    Benign essential hypertension    Relevant Orders    Albumin , Urine Random    Benign prostatic hyperplasia with incomplete bladder emptying    Relevant Orders    Prostate Specific Antigen    Coronary artery calcification seen on CAT scan    Esophageal reflux    Relevant Medications    esomeprazole (NexIUM) 40 mg DR capsule    Other Relevant Orders    Referral to Gastroenterology    Mild episode of recurrent major depressive disorder (CMS/HCC)    Relevant Medications    buPROPion XL (Wellbutrin XL) 300 mg 24 hr tablet    escitalopram (Lexapro) 5 mg tablet    Reactive airway disease - Primary    Relevant Medications    fluticasone (Flovent Diskus) 50 mcg/actuation diskus inhaler    Vitamin B12 deficiency    Relevant Orders    Vitamin B12    Vitamin D  deficiency    Relevant Orders    Vitamin D, Total    Elevated LDL cholesterol level    Relevant Orders    Cholesterol, LDL Direct     Other Visit Diagnoses       Class 1 obesity due to excess calories with serious comorbidity and body mass index (BMI) of 31.0 to 31.9 in adult        improving , he has dropped his bmi to 31.education provided.    BMI 31.0-31.9,adult        Paresthesia of hand, bilateral        EMG NCT ordered, CTS wrist splint at night, refer if not better.    Relevant Orders    EMG & nerve conduction    Esophageal dysphagia        increase nexium temporarily to bid, make an appt. with GI, Dr Mcfarlane    Relevant Orders    Referral to Gastroenterology    Blood tests for routine general physical examination        Relevant Orders    CBC and Auto Differential    Lipid Panel    Comprehensive Metabolic Panel    TSH with reflex to Free T4 if abnormal

## 2023-07-26 ENCOUNTER — OFFICE VISIT (OUTPATIENT)
Dept: PRIMARY CARE | Facility: CLINIC | Age: 63
End: 2023-07-26
Payer: COMMERCIAL

## 2023-07-26 VITALS
DIASTOLIC BLOOD PRESSURE: 86 MMHG | HEART RATE: 79 BPM | BODY MASS INDEX: 31.46 KG/M2 | SYSTOLIC BLOOD PRESSURE: 109 MMHG | WEIGHT: 237.4 LBS | HEIGHT: 73 IN | OXYGEN SATURATION: 94 %

## 2023-07-26 DIAGNOSIS — R13.19 ESOPHAGEAL DYSPHAGIA: ICD-10-CM

## 2023-07-26 DIAGNOSIS — K21.9 GASTROESOPHAGEAL REFLUX DISEASE, UNSPECIFIED WHETHER ESOPHAGITIS PRESENT: ICD-10-CM

## 2023-07-26 DIAGNOSIS — N40.1 BENIGN PROSTATIC HYPERPLASIA WITH INCOMPLETE BLADDER EMPTYING: ICD-10-CM

## 2023-07-26 DIAGNOSIS — E78.00 ELEVATED LDL CHOLESTEROL LEVEL: ICD-10-CM

## 2023-07-26 DIAGNOSIS — I25.10 CORONARY ARTERY CALCIFICATION SEEN ON CAT SCAN: ICD-10-CM

## 2023-07-26 DIAGNOSIS — R39.14 BENIGN PROSTATIC HYPERPLASIA WITH INCOMPLETE BLADDER EMPTYING: ICD-10-CM

## 2023-07-26 DIAGNOSIS — R20.2 PARESTHESIA OF HAND, BILATERAL: ICD-10-CM

## 2023-07-26 DIAGNOSIS — I10 BENIGN ESSENTIAL HYPERTENSION: ICD-10-CM

## 2023-07-26 DIAGNOSIS — I71.21 ANEURYSM OF ASCENDING AORTA WITHOUT RUPTURE (CMS-HCC): ICD-10-CM

## 2023-07-26 DIAGNOSIS — E66.09 CLASS 1 OBESITY DUE TO EXCESS CALORIES WITH SERIOUS COMORBIDITY AND BODY MASS INDEX (BMI) OF 31.0 TO 31.9 IN ADULT: ICD-10-CM

## 2023-07-26 DIAGNOSIS — E53.8 VITAMIN B12 DEFICIENCY: ICD-10-CM

## 2023-07-26 DIAGNOSIS — F33.0 MILD EPISODE OF RECURRENT MAJOR DEPRESSIVE DISORDER (CMS-HCC): ICD-10-CM

## 2023-07-26 DIAGNOSIS — Z00.00 BLOOD TESTS FOR ROUTINE GENERAL PHYSICAL EXAMINATION: ICD-10-CM

## 2023-07-26 DIAGNOSIS — J45.30 MILD PERSISTENT REACTIVE AIRWAY DISEASE WITHOUT COMPLICATION (HHS-HCC): Primary | ICD-10-CM

## 2023-07-26 DIAGNOSIS — E55.9 VITAMIN D DEFICIENCY: ICD-10-CM

## 2023-07-26 PROBLEM — J06.9 UPPER RESPIRATORY INFECTION WITH COUGH AND CONGESTION: Status: ACTIVE | Noted: 2023-07-26

## 2023-07-26 PROBLEM — H66.90 OTITIS MEDIA: Status: ACTIVE | Noted: 2023-07-26

## 2023-07-26 PROBLEM — R42 DIZZINESS: Status: ACTIVE | Noted: 2023-07-26

## 2023-07-26 PROBLEM — H66.001 NON-RECURRENT ACUTE SUPPURATIVE OTITIS MEDIA OF RIGHT EAR WITHOUT SPONTANEOUS RUPTURE OF TYMPANIC MEMBRANE: Status: ACTIVE | Noted: 2023-07-26

## 2023-07-26 PROBLEM — J45.909 ASTHMA (HHS-HCC): Status: ACTIVE | Noted: 2023-07-26

## 2023-07-26 PROCEDURE — 3079F DIAST BP 80-89 MM HG: CPT | Performed by: INTERNAL MEDICINE

## 2023-07-26 PROCEDURE — 1036F TOBACCO NON-USER: CPT | Performed by: INTERNAL MEDICINE

## 2023-07-26 PROCEDURE — 3074F SYST BP LT 130 MM HG: CPT | Performed by: INTERNAL MEDICINE

## 2023-07-26 PROCEDURE — 99215 OFFICE O/P EST HI 40 MIN: CPT | Performed by: INTERNAL MEDICINE

## 2023-07-26 PROCEDURE — 3008F BODY MASS INDEX DOCD: CPT | Performed by: INTERNAL MEDICINE

## 2023-07-26 RX ORDER — BUPROPION HYDROCHLORIDE 300 MG/1
300 TABLET ORAL EVERY MORNING
Qty: 90 TABLET | Refills: 1 | Status: SHIPPED | OUTPATIENT
Start: 2023-07-26 | End: 2024-02-21 | Stop reason: SDUPTHER

## 2023-07-26 RX ORDER — LOSARTAN POTASSIUM 100 MG/1
100 TABLET ORAL DAILY
COMMUNITY
Start: 2023-06-12 | End: 2023-10-25

## 2023-07-26 RX ORDER — ESOMEPRAZOLE MAGNESIUM 40 MG/1
40 CAPSULE, DELAYED RELEASE ORAL 2 TIMES DAILY
Qty: 180 CAPSULE | Refills: 0 | Status: SHIPPED | OUTPATIENT
Start: 2023-07-26 | End: 2024-04-01 | Stop reason: SDUPTHER

## 2023-07-26 RX ORDER — FLUTICASONE PROPIONATE 50 UG/1
1 POWDER, METERED RESPIRATORY (INHALATION)
Qty: 60 EACH | Refills: 11 | Status: SHIPPED | OUTPATIENT
Start: 2023-07-26 | End: 2024-03-18 | Stop reason: SDUPTHER

## 2023-07-26 RX ORDER — ESCITALOPRAM OXALATE 5 MG/1
2.5 TABLET ORAL DAILY
Qty: 45 TABLET | Refills: 1 | Status: SHIPPED | OUTPATIENT
Start: 2023-07-26

## 2023-07-26 ASSESSMENT — PATIENT HEALTH QUESTIONNAIRE - PHQ9
1. LITTLE INTEREST OR PLEASURE IN DOING THINGS: NOT AT ALL
SUM OF ALL RESPONSES TO PHQ9 QUESTIONS 1 AND 2: 0
2. FEELING DOWN, DEPRESSED OR HOPELESS: NOT AT ALL

## 2023-07-26 ASSESSMENT — PAIN SCALES - GENERAL: PAINLEVEL: 2

## 2023-07-26 NOTE — PROGRESS NOTES
Patient is here for cholesterol, thyroid, and HTN management. Patient also states he is having problems swallowing. He also has numbness of both hands when sleeping and numbness of the left hand when driving.  BN

## 2023-07-26 NOTE — PATIENT INSTRUCTIONS
Please obtain fasting labs in September.    Blood pressure is well controlled on current blood pressure medication.    Make an appt. With Dr Mcfarlane or his nurse practitioner, you will need an upper scope.  In the meantime   INCREASE Nexium, esomeprazole to 40 mg twice per day.    For issue discussed with Dr Rivas:  Increase bupropion XL in the am from 150 mg once per day to 300 mg once per day. I sent 300mg tabs to the pharmacy.  DECREASE es citalopram /lexapro to 1/2 tab of a 5mg tab every evening.    Tingling fingers:  Schedule nerve conduction test.  IT would help if you worse carpal tunnel wrist splint at night when sleeping. Especially left hand.  Move watch to right wrist.    Bring bp cuff to next visit.  Keep meds the same including asthma meds/flovent and as needed inhaler.  Appt. 3 months

## 2023-08-21 ENCOUNTER — TELEPHONE (OUTPATIENT)
Dept: PRIMARY CARE | Facility: CLINIC | Age: 63
End: 2023-08-21
Payer: COMMERCIAL

## 2023-08-21 NOTE — TELEPHONE ENCOUNTER
Pt called and stated at 1:00 pm today, his bp was 178/116, then 157/101 a little later and then not too long ago it was 160/107.   Pt # [phone]

## 2023-08-21 NOTE — TELEPHONE ENCOUNTER
"Pt reports that he just completed a meeting in which his boss was very demeaning, unreasonable and unkind.  He didn't feel well after today's meeding with him.  \"It's hard to describe how I felt.  Chest discomfort, nausea maybe - it's hard to describe.\"  He also states that his cardiologist has recently changed the pt to a new medication which he cannot recall the name of.  I told him that his sx and his blood pressure are concerning, and [facility].  He is aware that Dr. SCHULZ is out of town for the week.  I also suggested that he at least call his cardiologist.    He declined to seek eval/txmt stating that he is on his way to  [place] co[place]worker [place] the airport.  (Pt with pers h/o recurrent major depressive disorder.)  Please advise.  bridgette      "

## 2023-08-21 NOTE — TELEPHONE ENCOUNTER
I reached the pt on his cell phone.  He was notified of Dr. Schwartz's recommendations as indicated below.  He reports that his cardiologist recently put him on Losartan potassium 100mg/day.   bridgette

## 2023-08-26 NOTE — TELEPHONE ENCOUNTER
Care everywhere does not show he went to the er unless it was [facility] which I cannot access. Notihing seen in community record for [facility] so suspect he did not go to the er. Last stress test June 2021 and showed above average functional capacity (he is active), no ischemia, di dhave hypertensive response to exercise.  CAC score  2019 60,   Ct chest 2022 mild coronary art calcifications.

## 2023-10-03 ENCOUNTER — HOSPITAL ENCOUNTER (OUTPATIENT)
Dept: NEUROLOGY | Facility: HOSPITAL | Age: 63
Discharge: HOME | End: 2023-10-03
Payer: COMMERCIAL

## 2023-10-03 DIAGNOSIS — R20.2 PARESTHESIA OF SKIN: ICD-10-CM

## 2023-10-03 PROCEDURE — 95913 NRV CNDJ TEST 13/> STUDIES: CPT | Performed by: PSYCHIATRY & NEUROLOGY

## 2023-10-03 PROCEDURE — 95886 MUSC TEST DONE W/N TEST COMP: CPT | Performed by: PSYCHIATRY & NEUROLOGY

## 2023-10-24 ENCOUNTER — OFFICE (OUTPATIENT)
Dept: URBAN - METROPOLITAN AREA CLINIC 27 | Facility: CLINIC | Age: 63
End: 2023-10-24
Payer: COMMERCIAL

## 2023-10-24 VITALS
DIASTOLIC BLOOD PRESSURE: 102 MMHG | SYSTOLIC BLOOD PRESSURE: 151 MMHG | WEIGHT: 251 LBS | TEMPERATURE: 98.2 F | HEART RATE: 89 BPM

## 2023-10-24 DIAGNOSIS — Z09 ENCOUNTER FOR FOLLOW-UP EXAMINATION AFTER COMPLETED TREATMEN: ICD-10-CM

## 2023-10-24 DIAGNOSIS — Z86.010 PERSONAL HISTORY OF COLONIC POLYPS: ICD-10-CM

## 2023-10-24 DIAGNOSIS — R13.10 DYSPHAGIA, UNSPECIFIED: ICD-10-CM

## 2023-10-24 DIAGNOSIS — K22.2 ESOPHAGEAL OBSTRUCTION: ICD-10-CM

## 2023-10-24 PROCEDURE — 99214 OFFICE O/P EST MOD 30 MIN: CPT | Performed by: INTERNAL MEDICINE

## 2023-10-24 NOTE — PROGRESS NOTES
Subjective   Patient ID: Livan Wen is a 63 y.o. male who presents for Follow-up (Pt here for follow up and review. Pt has forgotten to bring in his cuff, and has been taking his BP at home and the readings have been consistently over 140's on the Systolic and over 90's Diastolic, with it being over 100's a lot of the time. ).  LAST VISIT PLAN 7/26/23  Problem List Items Addressed This Visit         Aneurysm of ascending aorta (CMS/HCC)     Benign essential hypertension     Relevant Orders     Albumin , Urine Random     Benign prostatic hyperplasia with incomplete bladder emptying     Relevant Orders     Prostate Specific Antigen     Coronary artery calcification seen on CAT scan     Esophageal reflux     Relevant Medications     esomeprazole (NexIUM) 40 mg DR capsule     Other Relevant Orders     Referral to Gastroenterology     Mild episode of recurrent major depressive disorder (CMS/HCC)     Relevant Medications     buPROPion XL (Wellbutrin XL) 300 mg 24 hr tablet     escitalopram (Lexapro) 5 mg tablet     Reactive airway disease - Primary     Relevant Medications     fluticasone (Flovent Diskus) 50 mcg/actuation diskus inhaler     Vitamin B12 deficiency     Relevant Orders     Vitamin B12     Vitamin D deficiency     Relevant Orders     Vitamin D, Total     Elevated LDL cholesterol level     Relevant Orders     Cholesterol, LDL Direct      Other Visit Diagnoses         Class 1 obesity due to excess calories with serious comorbidity and body mass index (BMI) of 31.0 to 31.9 in adult         improving , he has dropped his bmi to 31.education provided.     BMI 31.0-31.9,adult         Paresthesia of hand, bilateral         EMG NCT ordered, CTS wrist splint at night, refer if not better.     Relevant Orders     EMG & nerve conduction     Esophageal dysphagia         increase nexium temporarily to bid, make an appt. with GI, Dr Mcfarlane     Relevant Orders     Referral to Gastroenterology     Blood tests for routine  general physical examination         Relevant Orders     CBC and Auto Differential     Lipid Panel     Comprehensive Metabolic Panel     TSH with reflex to Free T4 if abnormal   END INFO FROM PRIOR VISIT  HPI    Follow-up on hypertension, hyperlipidemia, sleep apnea (he did see sleep medicine and is doing better with wearing his CPAP apparatus and feels it starting to help), obesity, GERD, hypothyroidism, depression, medication management.  His blood pressures at home have been too elevated 140s some 90s on the bottom.  Similar to what we are getting here.  He is on losartan Beek because of his ascending aorta aneurysm.  We discussed adding medication versus trying to change the losartan to a different ARB and see if we can get a better improvement.  There are certain patients genetically who do not do as well with losartan, he never had that testing done but we could try valsartan and see how he does.    Discussed life changes that could help his blood pressure: One of them is to cut back on his alcohol use, which would also help his fatty liver and his weight.  He is drinking less than he used to but still more than I would prefer given his other health issues.    Depression is stable, some work stress with mainly related to travel.  He has been driving frequently which means he is eating out a lot which means he is getting more sodium in his diet, he does not add salt at home and we discussed keeping that as low as possible at home.  He is though eating soups out of a can for lunch, so we discussed some better options and I sent him some information on the DASH diet.    He does have some carpal tunnel mild to moderate he still gets some of the tingling in his hands.  He is not wearing a carpal tunnel splint at night he did not get around to getting one yet.  Strongly urged him to do so.  Symptoms are not worse but not really better.  We discussed referral to hand Ortho if not improving but hopefully he will get  the splint and wear that at night and we will see if we can get those symptoms to improve.  He is not having any weakness.  Review of Systems    Cardiac: No CP (he occ gets his left upper chest twinge he has had for decades with negative cardiac eval and that is not new-no other cp)., palpitations, increased abebe  Resp: No sob, wheezing, cough  Extremities: No leg or ankle swelling, no claudication  Neuro: No dizziness, syncope, blurred vision. No new headaches.    Using cpap    Urinating ok  Scribe transcription from 10-25-23::  He denies swelling  He states his diet has been too good. He states he and his wife have a plan to get his weight down.     He will be getting an EGD on November 6. He reports continued issues with food getting stuck in his esophagus while eating and increasing Nexium did not help. We discussed keeping hydrated.   End scribe transcription.   Current Outpatient Medications   Medication Instructions    albuterol 90 mcg/actuation inhaler Inhale 1-2 puffs every 4-6 hours as needed    azelastine (Astelin) 137 mcg (0.1 %) nasal spray 2 sprays, Each Nostril, 2 times daily    buPROPion XL (WELLBUTRIN XL) 300 mg, oral, Every morning, Take 1 tablet daily with food in AM    cholecalciferol (VITAMIN D-3) 50 mcg, oral, Daily    cyanocobalamin (Vitamin B-12) 1,000 mcg tablet 1 tablet, oral, Daily    diclofenac sodium 1 % kit Apply to right knee 2 grms twice daily    DOCUSATE CALCIUM ORAL oral, Nightly PRN    escitalopram (LEXAPRO) 2.5 mg, oral, Daily    esomeprazole (NEXIUM) 40 mg, oral, 2 times daily, Take 1/2 hour before breakfast or lunch,and take at bedtime . Must be  from thyroid med by at least  one hour.    ferrous sulfate 325 (65 Fe) MG tablet oral, Take 1 tablet daily with food three days per week with supper. Do not take 3 hours of thyroid pill    fluticasone (Flonase) 50 mcg/actuation nasal spray 2 sprays, Each Nostril, Daily, Shake gently. Before first use, prime pump. After use,  "clean tip and replace cap.    fluticasone (Flovent Diskus) 50 mcg/actuation diskus inhaler 1 puff, inhalation, 2 times daily RT, Rinse mouth with water after use to reduce aftertaste and incidence of candidiasis. Do not swallow.    glucosamine HCl 750 mg tablet 2 tablets, oral, Daily    hydrocortisone (ANUSOL-HC) 25 mg, rectal, 2 times daily PRN, Insert 1 suppository rectally at bedtime for 7-12 days    levothyroxine (Synthroid, Levoxyl) 75 mcg tablet 1 tablet, oral, Daily    LORazepam (ATIVAN) 0.5 mg, oral, 1 tab daily if needed for stressful situations    magnesium oxide (MAG-OX) 250 mg, oral, 2 times daily, Avoid taking close to iron    multivitamin tablet 1 tablet, oral, Daily    polyethylene glycol (Glycolax) 17 gram/dose powder oral, Daily RT, Mix 1 packet in 8 ounces of liquid and drink once daily    psyllium (Metamucil) powder oral, Use as directed    rosuvastatin (Crestor) 10 mg tablet 1 tablet, oral, Daily    tamsulosin (FLOMAX) 0.4 mg, oral, 2 times daily    valsartan (DIOVAN) 320 mg, oral, Daily     Allergies   Allergen Reactions    Codeine Unknown    Morphine Headache     Objective   Lab Results   Component Value Date    WBC 8.5 10/08/2022    HGB 14.4 10/08/2022    HCT 43.2 10/08/2022     10/08/2022    CHOL 230 (H) 03/01/2023    TRIG 111 03/01/2023    HDL 76.6 03/01/2023    ALT 27 03/01/2023    AST 21 03/01/2023     10/08/2022    K 4.3 10/08/2022     (H) 10/08/2022    CREATININE 1.01 10/08/2022    BUN 16 10/08/2022    CO2 28 10/08/2022    TSH 2.59 10/08/2022    PSA 0.44 10/08/2022     par  Physical ExamBP (!) 138/92 (BP Location: Right arm, Patient Position: Sitting, BP Cuff Size: Adult)   Pulse 64   Resp 18   Ht 1.854 m (6' 1\")   Wt 113 kg (250 lb)   BMI 32.98 kg/m²       3/1/2023     8:05 AM 3/29/2023     8:47 AM 5/2/2023     3:39 PM 6/12/2023     4:10 PM 6/12/2023     4:11 PM 7/26/2023     8:22 AM 10/25/2023    11:17 AM   Vitals   Systolic 114 118 123 137 154 109 138 " "  Diastolic 82 72 85 84 84 86 92   Heart Rate  68 80 89 80 79 64   Temp 36.6 °C (97.8 °F)   36 °C (96.8 °F)      Resp  18  16 17  18   Height (in)  1.854 m (6' 1\") 1.842 m (6' 0.5\") 1.854 m (6' 1\")  1.842 m (6' 0.5\") 1.854 m (6' 1\")   Weight (lb)  243 245.13 244  237.4 250   BMI  32.06 kg/m2 32.79 kg/m2 32.19 kg/m2  31.75 kg/m2 32.98 kg/m2   BSA (m2)  2.38 m2 2.38 m2 2.39 m2  2.35 m2 2.41 m2   Visit Report  Report    Report Report       Patient looks well and is in no obvious distress.  HEENT:   Normocephalic, no facial asymmetry  Eyes: Sclera and conjunctiva are clear.  Neck: No adenopathy cervical (Ant/post/lat), no Supraclavicular nodes.   Thyroid normal.   Carotid pulses normal, no carotid bruits.  Lungs : RR normal. Clear to auscultation anterior, posterior and lateral. No rales, wheezes rhonchi or rubs. Good air exchange.  Heart: RRR. No Murmur, gallop, click or rub.  Abdomen: Bowel sounds normal, No bruits. No pulsatile mass. No hepatosplenomegaly, masses or tenderness. Soft, no guarding.  Vascular:  Posterior tibialis and dorsalis pedis pulses within normal limits bilaterally.   Extremities: no upper extremity edema. No lower extremity edema.   Musculoskeletal: no synovitis of joints seen. No new deformity.  Neuro: CN 2-12 intact. Alert, appropriate.   Ambulates independently.  No gross motor deficit.   No tremors.  Psych: normal mood and affect.  Skin: No rash, bruising petechiae or jaundice.      Livan was seen today for follow-up.  Diagnoses and all orders for this visit:  Benign essential hypertension (Primary)  -     valsartan (Diovan) 320 mg tablet; Take 1 tablet (320 mg) by mouth once daily.  Need for influenza vaccination  -     Flu vaccine (IIV4) age 6 months and greater, preservative free  Elevated blood pressure reading with diagnosis of hypertension  Mild persistent reactive airway disease without complication  BMI 32.0-32.9,adult  Class 1 obesity with alveolar hypoventilation, serious " comorbidity, and body mass index (BMI) of 31.0 to 31.9 in adult (CMS/[facility])  Elevated LDL cholesterol level  Obstructive sleep apnea  Aneurysm of ascending aorta without rupture (CMS/[facility])    Discussed being careful about heavy lifting, 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  If bp does not come down with lifestyle changes and valsartan consider adding thiazide diuretic. He does not have a hx of kidney stones. Could consider beta blocker but heart rate 64 today and has hx asthma/reactive airways/another alternative would be calcium channel blocker..        See wrap up section for patient instructions and  additional treatment plan.     Low sodium soups.  Be careful of processed lunch foods.  STOP losartan.  START valsartan 320 mg once per day.    Cut back on alcohol, maybe use some non alcoholic beer  Reason: improve bp, less toxic to liver.    Fasting blood test and urine test around the 8th of November after on valsartan for 2 weeks.  Instructions for obtaining lab tests:   You do not need a paper requisition when obtaining tests at a  facility. No appointment is needed for lab tests.  For fasting blood tests:  Please do not consume calories for 10-12 hours prior to this blood test. It is ok to drink water, you should be hydrated.  Please do not take vitamins, supplements or thyroid medication before your blood is drawn this day.   Most lab results should be available for your review on the  My Chart portal within 48 hours. Please contact the office if you have not received results within 2 weeks of obtaining the test.    Follow up in 2 months.  Call if problems  Continue to monitor blood pressure at home weekly.    Flu shot today.    Wear carpal tunnel splint.

## 2023-10-25 ENCOUNTER — APPOINTMENT (OUTPATIENT)
Dept: PRIMARY CARE | Facility: CLINIC | Age: 63
End: 2023-10-25
Payer: COMMERCIAL

## 2023-10-25 ENCOUNTER — OFFICE VISIT (OUTPATIENT)
Dept: PRIMARY CARE | Facility: CLINIC | Age: 63
End: 2023-10-25
Payer: COMMERCIAL

## 2023-10-25 VITALS
HEART RATE: 64 BPM | RESPIRATION RATE: 18 BRPM | HEIGHT: 73 IN | BODY MASS INDEX: 33.13 KG/M2 | WEIGHT: 250 LBS | SYSTOLIC BLOOD PRESSURE: 138 MMHG | DIASTOLIC BLOOD PRESSURE: 92 MMHG

## 2023-10-25 DIAGNOSIS — Z23 NEED FOR INFLUENZA VACCINATION: ICD-10-CM

## 2023-10-25 DIAGNOSIS — I71.21 ANEURYSM OF ASCENDING AORTA WITHOUT RUPTURE (CMS-HCC): ICD-10-CM

## 2023-10-25 DIAGNOSIS — E66.2 CLASS 1 OBESITY WITH ALVEOLAR HYPOVENTILATION, SERIOUS COMORBIDITY, AND BODY MASS INDEX (BMI) OF 31.0 TO 31.9 IN ADULT (MULTI): ICD-10-CM

## 2023-10-25 DIAGNOSIS — I10 BENIGN ESSENTIAL HYPERTENSION: Primary | ICD-10-CM

## 2023-10-25 DIAGNOSIS — G47.33 OBSTRUCTIVE SLEEP APNEA: ICD-10-CM

## 2023-10-25 DIAGNOSIS — E78.00 ELEVATED LDL CHOLESTEROL LEVEL: ICD-10-CM

## 2023-10-25 DIAGNOSIS — I10 ELEVATED BLOOD PRESSURE READING WITH DIAGNOSIS OF HYPERTENSION: ICD-10-CM

## 2023-10-25 DIAGNOSIS — J45.30 MILD PERSISTENT REACTIVE AIRWAY DISEASE WITHOUT COMPLICATION (HHS-HCC): ICD-10-CM

## 2023-10-25 PROCEDURE — 99214 OFFICE O/P EST MOD 30 MIN: CPT | Performed by: INTERNAL MEDICINE

## 2023-10-25 PROCEDURE — 90471 IMMUNIZATION ADMIN: CPT | Performed by: INTERNAL MEDICINE

## 2023-10-25 PROCEDURE — 3008F BODY MASS INDEX DOCD: CPT | Performed by: INTERNAL MEDICINE

## 2023-10-25 PROCEDURE — 3075F SYST BP GE 130 - 139MM HG: CPT | Performed by: INTERNAL MEDICINE

## 2023-10-25 PROCEDURE — 3080F DIAST BP >= 90 MM HG: CPT | Performed by: INTERNAL MEDICINE

## 2023-10-25 PROCEDURE — 90686 IIV4 VACC NO PRSV 0.5 ML IM: CPT | Performed by: INTERNAL MEDICINE

## 2023-10-25 PROCEDURE — 1036F TOBACCO NON-USER: CPT | Performed by: INTERNAL MEDICINE

## 2023-10-25 RX ORDER — VALSARTAN 320 MG/1
320 TABLET ORAL DAILY
Qty: 90 TABLET | Refills: 1 | Status: SHIPPED | OUTPATIENT
Start: 2023-10-25 | End: 2024-10-24

## 2023-10-25 ASSESSMENT — PAIN SCALES - GENERAL: PAINLEVEL: 0-NO PAIN

## 2023-10-25 NOTE — PATIENT INSTRUCTIONS
Low sodium soups.  Be careful of processed lunch foods.  STOP losartan.  START valsartan 320 mg once per day.    Cut back on alcohol, maybe use some non alcoholic beer  Reason: improve bp, less toxic to liver.    Fasting blood test and urine test around the 8th of November after on valsartan for 2 weeks.  Instructions for obtaining lab tests:   You do not need a paper requisition when obtaining tests at a  facility. No appointment is needed for lab tests.  For fasting blood tests:  Please do not consume calories for 10-12 hours prior to this blood test. It is ok to drink water, you should be hydrated.  Please do not take vitamins, supplements or thyroid medication before your blood is drawn this day.   Most lab results should be available for your review on the  My Chart portal within 48 hours. Please contact the office if you have not received results within 2 weeks of obtaining the test.    Follow up in 2 months.  Call if problems  Continue to monitor blood pressure at home weekly.    Flu shot today.    Wear carpal tunnel splint.

## 2023-10-30 VITALS
HEART RATE: 86 BPM | OXYGEN SATURATION: 87 % | OXYGEN SATURATION: 93 % | SYSTOLIC BLOOD PRESSURE: 138 MMHG | RESPIRATION RATE: 10 BRPM | DIASTOLIC BLOOD PRESSURE: 103 MMHG | HEART RATE: 74 BPM | SYSTOLIC BLOOD PRESSURE: 124 MMHG | SYSTOLIC BLOOD PRESSURE: 136 MMHG | TEMPERATURE: 98.1 F | RESPIRATION RATE: 15 BRPM | DIASTOLIC BLOOD PRESSURE: 91 MMHG | DIASTOLIC BLOOD PRESSURE: 89 MMHG | OXYGEN SATURATION: 93 % | DIASTOLIC BLOOD PRESSURE: 89 MMHG | DIASTOLIC BLOOD PRESSURE: 100 MMHG | SYSTOLIC BLOOD PRESSURE: 124 MMHG | DIASTOLIC BLOOD PRESSURE: 91 MMHG | SYSTOLIC BLOOD PRESSURE: 120 MMHG | DIASTOLIC BLOOD PRESSURE: 103 MMHG | DIASTOLIC BLOOD PRESSURE: 86 MMHG | HEART RATE: 91 BPM | HEART RATE: 90 BPM | SYSTOLIC BLOOD PRESSURE: 127 MMHG | HEART RATE: 77 BPM | RESPIRATION RATE: 12 BRPM | OXYGEN SATURATION: 88 % | SYSTOLIC BLOOD PRESSURE: 138 MMHG | TEMPERATURE: 98.1 F | OXYGEN SATURATION: 89 % | RESPIRATION RATE: 10 BRPM | HEART RATE: 95 BPM | HEART RATE: 91 BPM | OXYGEN SATURATION: 88 % | HEART RATE: 74 BPM | RESPIRATION RATE: 17 BRPM | RESPIRATION RATE: 12 BRPM | DIASTOLIC BLOOD PRESSURE: 96 MMHG | OXYGEN SATURATION: 95 % | DIASTOLIC BLOOD PRESSURE: 96 MMHG | OXYGEN SATURATION: 93 % | WEIGHT: 250 LBS | SYSTOLIC BLOOD PRESSURE: 120 MMHG | OXYGEN SATURATION: 95 % | SYSTOLIC BLOOD PRESSURE: 122 MMHG | DIASTOLIC BLOOD PRESSURE: 81 MMHG | SYSTOLIC BLOOD PRESSURE: 133 MMHG | WEIGHT: 250 LBS | HEART RATE: 78 BPM | TEMPERATURE: 98.1 F | RESPIRATION RATE: 15 BRPM | SYSTOLIC BLOOD PRESSURE: 122 MMHG | RESPIRATION RATE: 20 BRPM | OXYGEN SATURATION: 89 % | HEART RATE: 91 BPM | DIASTOLIC BLOOD PRESSURE: 81 MMHG | SYSTOLIC BLOOD PRESSURE: 120 MMHG | HEART RATE: 90 BPM | DIASTOLIC BLOOD PRESSURE: 100 MMHG | HEART RATE: 95 BPM | RESPIRATION RATE: 17 BRPM | SYSTOLIC BLOOD PRESSURE: 124 MMHG | DIASTOLIC BLOOD PRESSURE: 86 MMHG | DIASTOLIC BLOOD PRESSURE: 103 MMHG | HEART RATE: 95 BPM | OXYGEN SATURATION: 95 % | SYSTOLIC BLOOD PRESSURE: 127 MMHG | DIASTOLIC BLOOD PRESSURE: 100 MMHG | SYSTOLIC BLOOD PRESSURE: 127 MMHG | OXYGEN SATURATION: 89 % | DIASTOLIC BLOOD PRESSURE: 81 MMHG | HEART RATE: 74 BPM | OXYGEN SATURATION: 92 % | OXYGEN SATURATION: 87 % | HEART RATE: 90 BPM | RESPIRATION RATE: 17 BRPM | HEART RATE: 77 BPM | DIASTOLIC BLOOD PRESSURE: 86 MMHG | SYSTOLIC BLOOD PRESSURE: 133 MMHG | HEART RATE: 78 BPM | OXYGEN SATURATION: 92 % | RESPIRATION RATE: 20 BRPM | SYSTOLIC BLOOD PRESSURE: 136 MMHG | RESPIRATION RATE: 12 BRPM | SYSTOLIC BLOOD PRESSURE: 136 MMHG | WEIGHT: 250 LBS | HEART RATE: 78 BPM | OXYGEN SATURATION: 87 % | SYSTOLIC BLOOD PRESSURE: 138 MMHG | SYSTOLIC BLOOD PRESSURE: 122 MMHG | RESPIRATION RATE: 20 BRPM | HEART RATE: 86 BPM | DIASTOLIC BLOOD PRESSURE: 91 MMHG | DIASTOLIC BLOOD PRESSURE: 96 MMHG | OXYGEN SATURATION: 88 % | RESPIRATION RATE: 10 BRPM | HEART RATE: 77 BPM | OXYGEN SATURATION: 92 % | SYSTOLIC BLOOD PRESSURE: 133 MMHG | RESPIRATION RATE: 15 BRPM | HEART RATE: 86 BPM | DIASTOLIC BLOOD PRESSURE: 89 MMHG

## 2023-11-05 PROBLEM — K22.2: Status: ACTIVE | Noted: 2023-11-06

## 2023-11-06 ENCOUNTER — AMBULATORY SURGICAL CENTER (OUTPATIENT)
Dept: URBAN - METROPOLITAN AREA SURGERY 12 | Facility: SURGERY | Age: 63
End: 2023-11-06
Payer: COMMERCIAL

## 2023-11-06 ENCOUNTER — OFFICE (OUTPATIENT)
Dept: URBAN - METROPOLITAN AREA PATHOLOGY 2 | Facility: PATHOLOGY | Age: 63
End: 2023-11-06
Payer: COMMERCIAL

## 2023-11-06 DIAGNOSIS — K31.89 OTHER DISEASES OF STOMACH AND DUODENUM: ICD-10-CM

## 2023-11-06 DIAGNOSIS — R13.10 DYSPHAGIA, UNSPECIFIED: ICD-10-CM

## 2023-11-06 DIAGNOSIS — K44.9 DIAPHRAGMATIC HERNIA WITHOUT OBSTRUCTION OR GANGRENE: ICD-10-CM

## 2023-11-06 DIAGNOSIS — K22.2 ESOPHAGEAL OBSTRUCTION: ICD-10-CM

## 2023-11-06 DIAGNOSIS — K31.7 POLYP OF STOMACH AND DUODENUM: ICD-10-CM

## 2023-11-06 PROCEDURE — 43239 EGD BIOPSY SINGLE/MULTIPLE: CPT | Mod: 59 | Performed by: INTERNAL MEDICINE

## 2023-11-06 PROCEDURE — 88305 TISSUE EXAM BY PATHOLOGIST: CPT | Performed by: PATHOLOGY

## 2023-11-06 PROCEDURE — 43450 DILATE ESOPHAGUS 1/MULT PASS: CPT | Performed by: INTERNAL MEDICINE

## 2023-11-06 PROCEDURE — 88342 IMHCHEM/IMCYTCHM 1ST ANTB: CPT | Performed by: PATHOLOGY

## 2023-11-06 PROCEDURE — 43251 EGD REMOVE LESION SNARE: CPT | Performed by: INTERNAL MEDICINE

## 2023-11-06 NOTE — SERVICEROSSYSTEMNOTES
Recurrent dysphagia with solids
Occasional breakthrough heartburn
Chronic constipation
Intermittent right abdominal pain

## 2023-11-22 ENCOUNTER — LAB (OUTPATIENT)
Dept: LAB | Facility: LAB | Age: 63
End: 2023-11-22
Payer: COMMERCIAL

## 2023-11-22 DIAGNOSIS — N40.1 BENIGN PROSTATIC HYPERPLASIA WITH LOWER URINARY TRACT SYMPTOMS: Primary | ICD-10-CM

## 2023-11-22 DIAGNOSIS — R39.14 BENIGN PROSTATIC HYPERPLASIA WITH INCOMPLETE BLADDER EMPTYING: ICD-10-CM

## 2023-11-22 DIAGNOSIS — N40.1 BENIGN PROSTATIC HYPERPLASIA WITH INCOMPLETE BLADDER EMPTYING: ICD-10-CM

## 2023-11-22 PROCEDURE — 36415 COLL VENOUS BLD VENIPUNCTURE: CPT

## 2023-11-22 PROCEDURE — 84153 ASSAY OF PSA TOTAL: CPT

## 2023-11-23 LAB — PSA SERPL-MCNC: 0.63 NG/ML

## 2023-11-28 DIAGNOSIS — E78.00 ELEVATED LDL CHOLESTEROL LEVEL: ICD-10-CM

## 2023-11-28 RX ORDER — ROSUVASTATIN CALCIUM 10 MG/1
10 TABLET, COATED ORAL DAILY
Qty: 90 TABLET | Refills: 0 | Status: SHIPPED | OUTPATIENT
Start: 2023-11-28 | End: 2023-12-11 | Stop reason: SDUPTHER

## 2023-11-28 NOTE — TELEPHONE ENCOUNTER
Please refill.    rosuvastatin (Crestor) 10 mg tablet   Take 1 tablet (10 mg) by mouth once daily  Drug [place]  JESSE

## 2023-12-06 ENCOUNTER — LAB (OUTPATIENT)
Dept: LAB | Facility: LAB | Age: 63
End: 2023-12-06
Payer: COMMERCIAL

## 2023-12-06 DIAGNOSIS — E55.9 VITAMIN D DEFICIENCY: ICD-10-CM

## 2023-12-06 DIAGNOSIS — E78.00 ELEVATED LDL CHOLESTEROL LEVEL: ICD-10-CM

## 2023-12-06 DIAGNOSIS — E53.8 VITAMIN B12 DEFICIENCY: ICD-10-CM

## 2023-12-06 DIAGNOSIS — I10 BENIGN ESSENTIAL HYPERTENSION: ICD-10-CM

## 2023-12-06 DIAGNOSIS — Z00.00 BLOOD TESTS FOR ROUTINE GENERAL PHYSICAL EXAMINATION: ICD-10-CM

## 2023-12-06 DIAGNOSIS — N40.1 BENIGN PROSTATIC HYPERPLASIA WITH LOWER URINARY TRACT SYMPTOMS: ICD-10-CM

## 2023-12-06 LAB
25(OH)D3 SERPL-MCNC: 43 NG/ML (ref 30–100)
ALBUMIN SERPL BCP-MCNC: 4.5 G/DL (ref 3.4–5)
ALP SERPL-CCNC: 97 U/L (ref 33–136)
ALT SERPL W P-5'-P-CCNC: 47 U/L (ref 10–52)
ANION GAP SERPL CALC-SCNC: 15 MMOL/L (ref 10–20)
AST SERPL W P-5'-P-CCNC: 37 U/L (ref 9–39)
BASOPHILS # BLD AUTO: 0.06 X10*3/UL (ref 0–0.1)
BASOPHILS NFR BLD AUTO: 0.9 %
BILIRUB SERPL-MCNC: 0.7 MG/DL (ref 0–1.2)
BUN SERPL-MCNC: 18 MG/DL (ref 6–23)
CALCIUM SERPL-MCNC: 9.6 MG/DL (ref 8.6–10.6)
CHLORIDE SERPL-SCNC: 103 MMOL/L (ref 98–107)
CHOLEST SERPL-MCNC: 230 MG/DL (ref 0–199)
CHOLESTEROL/HDL RATIO: 3.7
CO2 SERPL-SCNC: 28 MMOL/L (ref 21–32)
CREAT SERPL-MCNC: 1.06 MG/DL (ref 0.5–1.3)
CREAT UR-MCNC: 260.1 MG/DL (ref 20–370)
EOSINOPHIL # BLD AUTO: 0.25 X10*3/UL (ref 0–0.7)
EOSINOPHIL NFR BLD AUTO: 3.7 %
ERYTHROCYTE [DISTWIDTH] IN BLOOD BY AUTOMATED COUNT: 12.1 % (ref 11.5–14.5)
GFR SERPL CREATININE-BSD FRML MDRD: 79 ML/MIN/1.73M*2
GLUCOSE SERPL-MCNC: 97 MG/DL (ref 74–99)
HCT VFR BLD AUTO: 42.6 % (ref 41–52)
HDLC SERPL-MCNC: 62.8 MG/DL
HGB BLD-MCNC: 13.9 G/DL (ref 13.5–17.5)
IMM GRANULOCYTES # BLD AUTO: 0.01 X10*3/UL (ref 0–0.7)
IMM GRANULOCYTES NFR BLD AUTO: 0.1 % (ref 0–0.9)
LDLC SERPL CALC-MCNC: 128 MG/DL
LDLC SERPL DIRECT ASSAY-MCNC: 143 MG/DL (ref 0–129)
LYMPHOCYTES # BLD AUTO: 1.09 X10*3/UL (ref 1.2–4.8)
LYMPHOCYTES NFR BLD AUTO: 16 %
MCH RBC QN AUTO: 32.6 PG (ref 26–34)
MCHC RBC AUTO-ENTMCNC: 32.6 G/DL (ref 32–36)
MCV RBC AUTO: 100 FL (ref 80–100)
MICROALBUMIN UR-MCNC: 14.3 MG/L
MICROALBUMIN/CREAT UR: 5.5 UG/MG CREAT
MONOCYTES # BLD AUTO: 0.68 X10*3/UL (ref 0.1–1)
MONOCYTES NFR BLD AUTO: 10 %
NEUTROPHILS # BLD AUTO: 4.73 X10*3/UL (ref 1.2–7.7)
NEUTROPHILS NFR BLD AUTO: 69.3 %
NON HDL CHOLESTEROL: 167 MG/DL (ref 0–149)
NRBC BLD-RTO: 0 /100 WBCS (ref 0–0)
PLATELET # BLD AUTO: 311 X10*3/UL (ref 150–450)
POTASSIUM SERPL-SCNC: 4.3 MMOL/L (ref 3.5–5.3)
PROT SERPL-MCNC: 7.2 G/DL (ref 6.4–8.2)
PSA SERPL-MCNC: 0.42 NG/ML
RBC # BLD AUTO: 4.26 X10*6/UL (ref 4.5–5.9)
SODIUM SERPL-SCNC: 142 MMOL/L (ref 136–145)
T4 FREE SERPL-MCNC: 1.04 NG/DL (ref 0.78–1.48)
TRIGL SERPL-MCNC: 198 MG/DL (ref 0–149)
TSH SERPL-ACNC: 5.14 MIU/L (ref 0.44–3.98)
VIT B12 SERPL-MCNC: 637 PG/ML (ref 211–911)
VLDL: 40 MG/DL (ref 0–40)
WBC # BLD AUTO: 6.8 X10*3/UL (ref 4.4–11.3)

## 2023-12-06 PROCEDURE — 85025 COMPLETE CBC W/AUTO DIFF WBC: CPT

## 2023-12-06 PROCEDURE — 80061 LIPID PANEL: CPT

## 2023-12-06 PROCEDURE — 84439 ASSAY OF FREE THYROXINE: CPT

## 2023-12-06 PROCEDURE — 82570 ASSAY OF URINE CREATININE: CPT

## 2023-12-06 PROCEDURE — 84153 ASSAY OF PSA TOTAL: CPT

## 2023-12-06 PROCEDURE — 82043 UR ALBUMIN QUANTITATIVE: CPT

## 2023-12-06 PROCEDURE — 36415 COLL VENOUS BLD VENIPUNCTURE: CPT

## 2023-12-06 PROCEDURE — 84443 ASSAY THYROID STIM HORMONE: CPT

## 2023-12-06 PROCEDURE — 80053 COMPREHEN METABOLIC PANEL: CPT

## 2023-12-06 PROCEDURE — 83721 ASSAY OF BLOOD LIPOPROTEIN: CPT

## 2023-12-06 PROCEDURE — 82607 VITAMIN B-12: CPT

## 2023-12-06 PROCEDURE — 82306 VITAMIN D 25 HYDROXY: CPT

## 2023-12-08 NOTE — PROGRESS NOTES
Subjective   Patient ID: Livan Wen is a 63 y.o. male who presents for Follow-up (2 month follow up and review on BP. Pt forgot to bring in cuff again. Pt still having diastolic pressures over 100's at times and systolic has been running consistently over 140's. ).  LAST VISIT PLAN 10/25/23  Instructions    Low sodium soups.  Be careful of processed lunch foods.  STOP losartan.  START valsartan 320 mg once per day.     Cut back on alcohol, maybe use some non alcoholic beer  Reason: improve bp, less toxic to liver.     Fasting blood test and urine test around the 8th of November after on valsartan for 2 weeks.  Instructions for obtaining lab tests:   You do not need a paper requisition when obtaining tests at a  facility. No appointment is needed for lab tests.  For fasting blood tests:  Please do not consume calories for 10-12 hours prior to this blood test. It is ok to drink water, you should be hydrated.  Please do not take vitamins, supplements or thyroid medication before your blood is drawn this day.   Most lab results should be available for your review on the  My Chart portal within 48 hours. Please contact the office if you have not received results within 2 weeks of obtaining the test.     Follow up in 2 months.  Call if problems  Continue to monitor blood pressure at home weekly.     Flu shot today.     Wear carpal tunnel splint.        END INFO FROM PRIOR VISIT  HPI  Here to follow up on bp's running high at home.  Stress at work.  He is on a PIT performance improvement plan.  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 They are priced higher than double coin which is better known value sathya. Says he is getting berated at work, and blaming them.    Looking for another job.   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  The boss is a problem   Cp is worse than it ever has been, same spot left of mid sternum-this has been determined not to be  cardiac  Severity comes and goes but is there all the time and this is not new.  No palpitations    Walking [place] 88 steps at the [facility] it bothered him.cp mildly but not new. Mild soboe at the 88 steps.    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  ADAMARIS, using cpap    Mood could be better. Both depressed and distressed.  No suicidal thoughts. Work is all of it. All good with family life.  Kady is pregnant , son is getting .    We talked about increasing med but he would like to wait and find out what is going on with possibly his bp cuff not working properly    Has not cut back on alcohol much as is football season -had 8 beers yesterday with the games.   noon to 8 pm. [employer].  12/6 labs reviewed.  Review of Systems  See hpi  Current Outpatient Medications   Medication Instructions    albuterol 90 mcg/actuation inhaler Inhale 1-2 puffs every 4-6 hours as needed    azelastine (Astelin) 137 mcg (0.1 %) nasal spray 2 sprays, Each Nostril, 2 times daily    buPROPion XL (WELLBUTRIN XL) 300 mg, oral, Every morning, Take 1 tablet daily with food in AM    cholecalciferol (VITAMIN D-3) 50 mcg, oral, Daily    cyanocobalamin (Vitamin B-12) 1,000 mcg tablet 1 tablet, oral, Daily    diclofenac sodium 1 % kit Apply to right knee 2 grms twice daily    DOCUSATE CALCIUM ORAL oral, Nightly PRN    escitalopram (LEXAPRO) 2.5 mg, oral, Daily    esomeprazole (NEXIUM) 40 mg, oral, 2 times daily, Take 1/2 hour before breakfast or lunch,and take at bedtime . Must be  from thyroid med by at least  one hour.    ferrous sulfate 325 (65 Fe) MG tablet oral, Take 1 tablet daily with food three days per week with supper. Do not take 3 hours of thyroid pill    fluticasone (Flonase) 50 mcg/actuation nasal spray 2 sprays, Each Nostril, Daily, Shake gently. Before first use, prime pump. After use, clean tip and replace cap.    fluticasone  (Flovent Diskus) 50 mcg/actuation diskus inhaler 1 puff, inhalation, 2 times daily RT, Rinse mouth with water after use to reduce aftertaste and incidence of candidiasis. Do not swallow.    glucosamine HCl 750 mg tablet 2 tablets, oral, Daily    hydrocortisone (ANUSOL-HC) 25 mg, rectal, 2 times daily PRN, Insert 1 suppository rectally at bedtime for 7-12 days    levothyroxine (SYNTHROID, LEVOXYL) 75 mcg, oral, Daily before breakfast, Take by itself, on an empty stomach and nothing but water for one hour after.    LORazepam (ATIVAN) 0.5 mg, oral, 1 tab daily if needed for stressful situations    magnesium oxide (MAG-OX) 250 mg, oral, 2 times daily, Avoid taking close to iron    multivitamin tablet 1 tablet, oral, Daily    polyethylene glycol (Glycolax) 17 gram/dose powder oral, Daily RT, Mix 1 packet in 8 ounces of liquid and drink once daily    psyllium (Metamucil) powder oral, Use as directed    rosuvastatin (CRESTOR) 20 mg, oral, Daily    tamsulosin (FLOMAX) 0.4 mg, oral, 2 times daily    valsartan (DIOVAN) 320 mg, oral, Daily     Allergies   Allergen Reactions    Codeine Unknown    Morphine Headache     Objective   Lab Results   Component Value Date    WBC 6.8 12/06/2023    HGB 13.9 12/06/2023    HCT 42.6 12/06/2023     12/06/2023    CHOL 230 (H) 12/06/2023    TRIG 198 (H) 12/06/2023    HDL 62.8 12/06/2023    LDLDIRECT 143 (H) 12/06/2023    ALT 47 12/06/2023    AST 37 12/06/2023     12/06/2023    K 4.3 12/06/2023     12/06/2023    CREATININE 1.06 12/06/2023    BUN 18 12/06/2023    CO2 28 12/06/2023    TSH 5.14 (H) 12/06/2023    PSA 0.42 12/06/2023     par  Physical ExamBP 118/78 (BP Location: Right arm, Patient Position: Sitting, BP Cuff Size: Adult)   Pulse 72   Resp 18   Ht 1.829 m (6')   Wt 110 kg (242 lb)   BMI 32.82 kg/m²   Patient looks well and is in no obvious distress.  HEENT:   Normocephalic, no facial asymmetry  Eyes: Sclera and conjunctiva are clear.  Neck: No adenopathy  cervical (Ant/post/lat), no Supraclavicular nodes.   Thyroid normal.   Carotid pulses normal, no carotid bruits.  Lungs : RR normal. Clear to auscultation anterior, posterior and lateral. No rales, wheezes rhonchi or rubs. Good air exchange.  Heart: RRR. No Murmur, gallop, click or rub.  Chest wall bump to left of sternum where 2 ribs were broken in the past but cannot reproduce his pain even tho that is where it is.  Abdomen:  No pulsatile mass. No hepatosplenomegaly, masses or tenderness. Soft, no guarding.  Vascular:  Posterior tibialis pulses within normal limits bilaterally.   Extremities: no upper extremity edema. No lower extremity edema.   Musculoskeletal: no synovitis of joints seen. No new deformity.  Neuro: CN 2-12 intact. Alert, appropriate.   Ambulates independently.  No gross motor deficit.   No tremors.  Psych: normal mood and affect.  Skin: No rash, bruising petechiae or jaundice.          Assessment/Plan   Problem List Items Addressed This Visit       Atypical chest pain    Benign essential hypertension - Primary    Relevant Orders    Nuclear Stress Test    Benign prostatic hyperplasia with incomplete bladder emptying    Obstructive sleep apnea    Relevant Medications    fluticasone (Flonase) 50 mcg/actuation nasal spray    Coronary artery calcification seen on CAT scan    Relevant Orders    Nuclear Stress Test    Hypothyroidism    Relevant Orders    TSH with reflex to Free T4 if abnormal    Mild episode of recurrent major depressive disorder (CMS/HCC)    Vitamin B12 deficiency    Vitamin D deficiency    SOBOE (shortness of breath on exertion)    Relevant Orders    Nuclear Stress Test    Elevated LDL cholesterol level    Relevant Medications    rosuvastatin (Crestor) 20 mg tablet    Other Relevant Orders    Lipid Panel    Aspartate Aminotransferase    Alanine Aminotransferase     Other Visit Diagnoses       Exertional chest pain        Relevant Orders    Nuclear Stress Test    TSH elevation               He has a 10.7% ascvd risk and in addition has vague symptoms with htn obesity, erin inc chol-nuc est ordered. Call if worse.  BP better her than at home, and ? Is it due to work stress when he gets home or is his cuff not accurate? See plan.  Re hypothyroidism, he is not sure if he is taking thyroid med in the most optimal manner, will assess.  Discussed diet increase his statin.ldl goal is 70 or below.currently it is 143.    See wrap up section for patient instructions and  additional treatment plan.   Bring in BP machine to compare it to ours, if you can today great, if not, then tomorrow.     Check to see that you are taking levothyroxine 75 mcg.once per day, empty stomach and nothing but water for one hour after. Cannot be taken with other pills.    Rosuvastatin change to 20 mg daily: Make the change by January.    Mediterranean diet.    Call if issues with mood become more of a problem    Stress test. Reception please assist in scheduling, (anahy thurmanmalik [facility], [facility]).    Use the flovent disk (oral inhaler) every day, regularly.    Make any coffee after 2 cups, decaf.  Stay hydrated.  Cut back on alcohol as we discussed    Follow up 3 months, every 3 months.  Fasting blood test the week before the next appt.  No vitamins or thyroid pill that am until after blood is drawn.    (Time:  45 minutes).

## 2023-12-11 ENCOUNTER — OFFICE VISIT (OUTPATIENT)
Dept: PRIMARY CARE | Facility: CLINIC | Age: 63
End: 2023-12-11
Payer: COMMERCIAL

## 2023-12-11 VITALS
HEART RATE: 72 BPM | RESPIRATION RATE: 18 BRPM | HEIGHT: 72 IN | BODY MASS INDEX: 32.78 KG/M2 | SYSTOLIC BLOOD PRESSURE: 118 MMHG | WEIGHT: 242 LBS | DIASTOLIC BLOOD PRESSURE: 78 MMHG

## 2023-12-11 DIAGNOSIS — R07.89 ATYPICAL CHEST PAIN: ICD-10-CM

## 2023-12-11 DIAGNOSIS — I10 BENIGN ESSENTIAL HYPERTENSION: ICD-10-CM

## 2023-12-11 DIAGNOSIS — E53.8 VITAMIN B12 DEFICIENCY: ICD-10-CM

## 2023-12-11 DIAGNOSIS — I25.10 CORONARY ARTERY CALCIFICATION SEEN ON CAT SCAN: ICD-10-CM

## 2023-12-11 DIAGNOSIS — R07.9 EXERTIONAL CHEST PAIN: Primary | ICD-10-CM

## 2023-12-11 DIAGNOSIS — R39.14 BENIGN PROSTATIC HYPERPLASIA WITH INCOMPLETE BLADDER EMPTYING: ICD-10-CM

## 2023-12-11 DIAGNOSIS — E06.3 HYPOTHYROIDISM DUE TO HASHIMOTO'S THYROIDITIS: ICD-10-CM

## 2023-12-11 DIAGNOSIS — N40.1 BENIGN PROSTATIC HYPERPLASIA WITH INCOMPLETE BLADDER EMPTYING: ICD-10-CM

## 2023-12-11 DIAGNOSIS — F33.0 MILD EPISODE OF RECURRENT MAJOR DEPRESSIVE DISORDER (CMS-HCC): ICD-10-CM

## 2023-12-11 DIAGNOSIS — R06.02 SOBOE (SHORTNESS OF BREATH ON EXERTION): ICD-10-CM

## 2023-12-11 DIAGNOSIS — E78.00 ELEVATED LDL CHOLESTEROL LEVEL: ICD-10-CM

## 2023-12-11 DIAGNOSIS — G47.33 OBSTRUCTIVE SLEEP APNEA: ICD-10-CM

## 2023-12-11 DIAGNOSIS — E55.9 VITAMIN D DEFICIENCY: ICD-10-CM

## 2023-12-11 DIAGNOSIS — R79.89 TSH ELEVATION: ICD-10-CM

## 2023-12-11 DIAGNOSIS — E03.8 HYPOTHYROIDISM DUE TO HASHIMOTO'S THYROIDITIS: ICD-10-CM

## 2023-12-11 PROBLEM — H66.001 NON-RECURRENT ACUTE SUPPURATIVE OTITIS MEDIA OF RIGHT EAR WITHOUT SPONTANEOUS RUPTURE OF TYMPANIC MEMBRANE: Status: RESOLVED | Noted: 2023-07-26 | Resolved: 2023-12-11

## 2023-12-11 PROBLEM — K13.79 UVULAR SWELLING: Status: RESOLVED | Noted: 2023-02-16 | Resolved: 2023-12-11

## 2023-12-11 PROBLEM — H66.90 OTITIS MEDIA: Status: RESOLVED | Noted: 2023-07-26 | Resolved: 2023-12-11

## 2023-12-11 PROBLEM — J06.9 UPPER RESPIRATORY INFECTION WITH COUGH AND CONGESTION: Status: RESOLVED | Noted: 2023-07-26 | Resolved: 2023-12-11

## 2023-12-11 PROCEDURE — 3078F DIAST BP <80 MM HG: CPT | Performed by: INTERNAL MEDICINE

## 2023-12-11 PROCEDURE — 3008F BODY MASS INDEX DOCD: CPT | Performed by: INTERNAL MEDICINE

## 2023-12-11 PROCEDURE — 3074F SYST BP LT 130 MM HG: CPT | Performed by: INTERNAL MEDICINE

## 2023-12-11 PROCEDURE — 99214 OFFICE O/P EST MOD 30 MIN: CPT | Performed by: INTERNAL MEDICINE

## 2023-12-11 PROCEDURE — 1036F TOBACCO NON-USER: CPT | Performed by: INTERNAL MEDICINE

## 2023-12-11 RX ORDER — FLUTICASONE PROPIONATE 50 MCG
2 SPRAY, SUSPENSION (ML) NASAL DAILY
Qty: 48 G | Refills: 3 | Status: SHIPPED | OUTPATIENT
Start: 2023-12-11 | End: 2024-03-10

## 2023-12-11 RX ORDER — LEVOTHYROXINE SODIUM 75 UG/1
75 TABLET ORAL
Qty: 90 TABLET | Refills: 3 | Status: SHIPPED | OUTPATIENT
Start: 2023-12-11 | End: 2023-12-21 | Stop reason: SDUPTHER

## 2023-12-11 RX ORDER — ROSUVASTATIN CALCIUM 20 MG/1
20 TABLET, COATED ORAL DAILY
Qty: 90 TABLET | Refills: 1 | Status: SHIPPED | OUTPATIENT
Start: 2023-12-11 | End: 2024-02-21 | Stop reason: SDUPTHER

## 2023-12-11 ASSESSMENT — PAIN SCALES - GENERAL: PAINLEVEL: 0-NO PAIN

## 2023-12-11 NOTE — PATIENT INSTRUCTIONS
Bring in BP machine to compare it to ours, if you can today great, if not, then tomorrow.     Check to see that you are taking levothyroxine 75 mcg.once per day, empty stomach and nothing but water for one hour after. Cannot be taken with other pills.    Rosuvastatin change to 20 mg daily: Make the change by January.    Mediterranean diet.    Call if issues with mood become more of a problem    Stress test. Reception please assist in scheduling, (anahy brown [facility], [facility]).    Use the flovent disk (oral inhaler) every day, regularly.    Make any coffee after 2 cups, decaf.  Stay hydrated.  Cut back on alcohol as we discussed    Follow up 3 months, every 3 months.  Fasting blood test the week before the next appt.  No vitamins or thyroid pill that am until after blood is drawn.

## 2023-12-12 ENCOUNTER — CLINICAL SUPPORT (OUTPATIENT)
Dept: PRIMARY CARE | Facility: CLINIC | Age: 63
End: 2023-12-12
Payer: COMMERCIAL

## 2023-12-12 DIAGNOSIS — I10 BENIGN ESSENTIAL HYPERTENSION: ICD-10-CM

## 2023-12-12 NOTE — PROGRESS NOTES
"Patient is here for nurse visit/BP machine check/comparison.  Patient states \"better readings in the morning than the evening\"  8:05 am machine-124/85  8:10 am manual-127/78    Patient also provided pictures of BP machine readings and dates:  11/28 11am   -137/8, 77  12/1 8am   -143/84, 80  12/5 2pm    -152/95, 85  12/6   5:30pm-175/108, 77   6pm-145/93, 80  12/11   4p-155/97, 105   8p-170/112, 80  Takes Valsaratan 320mg po daily, he states \"takes at bedtime\"  Patient stated \"checks BP when doesn't feel well and its elevated\"  Patient also stated \"very stressful work environment and wanting to retire\"    "

## 2023-12-21 DIAGNOSIS — E06.3 HYPOTHYROIDISM DUE TO HASHIMOTO'S THYROIDITIS: ICD-10-CM

## 2023-12-21 DIAGNOSIS — E03.8 HYPOTHYROIDISM DUE TO HASHIMOTO'S THYROIDITIS: ICD-10-CM

## 2023-12-21 NOTE — TELEPHONE ENCOUNTER
Please refill.    levothyroxine (Synthroid, Levoxyl) 75 mcg tablet   Take 1 tablet (75 mcg) by mouth once daily in the morning. Take before meals. Take by itself, on an empty stomach and nothing but water for one hour after.   [facility]

## 2023-12-22 RX ORDER — LEVOTHYROXINE SODIUM 75 UG/1
75 TABLET ORAL
Qty: 30 TABLET | Refills: 0 | Status: SHIPPED | OUTPATIENT
Start: 2023-12-22

## 2024-01-03 ENCOUNTER — HOSPITAL ENCOUNTER (OUTPATIENT)
Dept: RADIOLOGY | Facility: HOSPITAL | Age: 64
Discharge: HOME | End: 2024-01-03
Payer: COMMERCIAL

## 2024-01-03 ENCOUNTER — HOSPITAL ENCOUNTER (OUTPATIENT)
Dept: CARDIOLOGY | Facility: HOSPITAL | Age: 64
Discharge: HOME | End: 2024-01-03
Payer: COMMERCIAL

## 2024-01-03 DIAGNOSIS — R07.9 EXERTIONAL CHEST PAIN: ICD-10-CM

## 2024-01-03 DIAGNOSIS — R06.02 SOBOE (SHORTNESS OF BREATH ON EXERTION): ICD-10-CM

## 2024-01-03 DIAGNOSIS — I25.10 CORONARY ARTERY CALCIFICATION SEEN ON CAT SCAN: ICD-10-CM

## 2024-01-03 DIAGNOSIS — I10 BENIGN ESSENTIAL HYPERTENSION: ICD-10-CM

## 2024-01-03 PROCEDURE — A9502 TC99M TETROFOSMIN: HCPCS | Performed by: INTERNAL MEDICINE

## 2024-01-03 PROCEDURE — 93017 CV STRESS TEST TRACING ONLY: CPT

## 2024-01-03 PROCEDURE — 78452 HT MUSCLE IMAGE SPECT MULT: CPT | Performed by: INTERNAL MEDICINE

## 2024-01-03 PROCEDURE — 3430000001 HC RX 343 DIAGNOSTIC RADIOPHARMACEUTICALS: Performed by: INTERNAL MEDICINE

## 2024-01-03 PROCEDURE — 78452 HT MUSCLE IMAGE SPECT MULT: CPT

## 2024-01-03 PROCEDURE — 93016 CV STRESS TEST SUPVJ ONLY: CPT | Performed by: STUDENT IN AN ORGANIZED HEALTH CARE EDUCATION/TRAINING PROGRAM

## 2024-01-03 RX ADMIN — TETROFOSMIN 32.1 MILLICURIE: 0.23 INJECTION, POWDER, LYOPHILIZED, FOR SOLUTION INTRAVENOUS at 10:00

## 2024-01-03 RX ADMIN — TETROFOSMIN 11.1 MILLICURIE: 0.23 INJECTION, POWDER, LYOPHILIZED, FOR SOLUTION INTRAVENOUS at 08:15

## 2024-01-09 DIAGNOSIS — G47.33 OBSTRUCTIVE SLEEP APNEA: ICD-10-CM

## 2024-01-09 NOTE — TELEPHONE ENCOUNTER
Med refill    magnesium oxide (Mag-Ox) 250 mg magnesium tablet   Take 1 tablet (250 mg) by mouth 2 times a day. Avoid taking close to iron     [facility]    90 days (180 tabs)     BN

## 2024-01-10 RX ORDER — TALC
250 POWDER (GRAM) TOPICAL 2 TIMES DAILY
Qty: 180 TABLET | Refills: 0 | Status: SHIPPED | OUTPATIENT
Start: 2024-01-10

## 2024-02-21 DIAGNOSIS — E78.00 ELEVATED LDL CHOLESTEROL LEVEL: ICD-10-CM

## 2024-02-21 DIAGNOSIS — R39.14 BENIGN PROSTATIC HYPERPLASIA WITH INCOMPLETE BLADDER EMPTYING: ICD-10-CM

## 2024-02-21 DIAGNOSIS — F33.0 MILD EPISODE OF RECURRENT MAJOR DEPRESSIVE DISORDER (CMS-HCC): ICD-10-CM

## 2024-02-21 DIAGNOSIS — N40.1 BENIGN PROSTATIC HYPERPLASIA WITH INCOMPLETE BLADDER EMPTYING: ICD-10-CM

## 2024-02-21 RX ORDER — BUPROPION HYDROCHLORIDE 300 MG/1
300 TABLET ORAL EVERY MORNING
Qty: 90 TABLET | Refills: 1 | Status: SHIPPED | OUTPATIENT
Start: 2024-02-21

## 2024-02-21 RX ORDER — ROSUVASTATIN CALCIUM 20 MG/1
20 TABLET, COATED ORAL DAILY
Qty: 90 TABLET | Refills: 1 | Status: SHIPPED | OUTPATIENT
Start: 2024-02-21

## 2024-02-21 RX ORDER — TAMSULOSIN HYDROCHLORIDE 0.4 MG/1
0.4 CAPSULE ORAL 2 TIMES DAILY
Qty: 180 CAPSULE | Refills: 1 | Status: SHIPPED | OUTPATIENT
Start: 2024-02-21

## 2024-02-21 NOTE — TELEPHONE ENCOUNTER
Please refill.    tamsulosin (Flomax) 0.4 mg 24 hr capsule   Take 1 capsule (0.4 mg) by mouth 2 times a day     rosuvastatin (Crestor) 20 mg tablet   Take 1 tablet (20 mg) by mouth once daily     buPROPion XL (Wellbutrin XL) 300 mg 24 hr tablet   Take 1 tablet (300 mg) by mouth once daily in the morning. Take 1 tablet daily with food in AM   [facility] [place]

## 2024-03-07 DIAGNOSIS — E78.00 ELEVATED LDL CHOLESTEROL LEVEL: ICD-10-CM

## 2024-03-07 DIAGNOSIS — F33.0 MILD EPISODE OF RECURRENT MAJOR DEPRESSIVE DISORDER (CMS-HCC): ICD-10-CM

## 2024-03-07 RX ORDER — ROSUVASTATIN CALCIUM 20 MG/1
20 TABLET, COATED ORAL DAILY
Qty: 90 TABLET | Refills: 1 | Status: CANCELLED | OUTPATIENT
Start: 2024-03-07

## 2024-03-07 RX ORDER — BUPROPION HYDROCHLORIDE 300 MG/1
300 TABLET ORAL EVERY MORNING
Qty: 90 TABLET | Refills: 1 | Status: CANCELLED | OUTPATIENT
Start: 2024-03-07

## 2024-03-07 NOTE — TELEPHONE ENCOUNTER
This can wait until Monday and patient is aware    Med refill    buPROPion XL (Wellbutrin XL) 300 mg 24 hr tablet   Take 1 tablet (300 mg) by mouth once daily in the morning. Take 1 tablet daily with food in AM     rosuvastatin (Crestor) 20 mg tablet   Take 1 tablet (20 mg) by mouth once daily.     90 days with refills    [facility]    BN

## 2024-03-17 PROBLEM — J45.30 MILD PERSISTENT ASTHMA WITHOUT COMPLICATION (HHS-HCC): Status: ACTIVE | Noted: 2023-07-26

## 2024-03-17 NOTE — PROGRESS NOTES
Subjective   Patient ID: Livan Wen is a 64 y.o. male who presents for Follow-up (Pt here for follow up on HTN. Has c/o a cough x1 week.).  Notes from December 2023 visit:  He has a 10.7% ascvd risk and in addition has vague symptoms with htn obesity, erin inc chol-nuc est ordered. Call if worse.  BP better her than at home, and ? Is it due to work stress when he gets home or is his cuff not accurate? See plan.  Re hypothyroidism, he is not sure if he is taking thyroid med in the most optimal manner, will assess.  Discussed diet increase his statin.ldl goal is 70 or below.currently it is 143.   Bring in BP machine to compare it to ours, if you can today great, if not, then tomorrow.   Check to see that you are taking levothyroxine 75 mcg.once per day, empty stomach and nothing but water for one hour after. Cannot be taken with other pills.  Rosuvastatin change to 20 mg daily: Make the change by January.Mediterranean diet.  Call if issues with mood become more of a problem  Stress test. Reception please assist in scheduling, (anahy thurmanmalik [facility]).  Use the flovent disk (oral inhaler) every day, regularly.  Make any coffee after 2 cups, decaf.  Stay hydrated.  Cut back on alcohol as we discussed  END INFO FROM PRIOR VISIT  HPI    [facility] and review labs to check on increased statin dose and kameron tsh elevation of 5.13 on December labs.    Re cold: started 6 days ago, wed, thurs ruben. And fri and at a lot of uncontrollable coughing and pulled muscle right lower lateral thorax, improving a little hurts to touch. Sneeze and cough would bring him to his knees. Productive clear. No ear ache st or fever.    HLD, cor art calcium, atypical recurrent CP:Last visit we increased his rosuvastatin to 20mg, he had a stress test jan 2024 which was normal, and good exercise capacity. He was to have done fasting blood work to review at this visit, not completed. Has  cardio follow up June. Has same chest pain left upper anterior chest moves around but not with exertion.      Hypothyroidism: TSH elevation, he did not yet get requested labs. We reviewed how to take meds last visit.    Obesity, sleep apnea:He was asked to cut back on alcohol (depression, fatty liver, overweight and over use plus sleep apnea).See notes above from that visit. Has follow up in may with sleep med. Is now not drinking during the week and sleeping better at night.    HTN on valsartan, changed last year from losartan- this is his only bp med. Does take magnesium.    He has had covid x 3 and last about a year.    ASTHMA/SOBOE: we asked him to use his steroid inhaler regularly. Flovent disc. Has rescue inhaler.Compliance:? He is doing this maybe every other day and did note his cough and soboe were good until cough started a week ago.    Depression: on bupropion and low dose lexapro--feeling pretty good with today being first day of somewhat forced [employer]. But he is glad to be out of there, [place] company boss, narcissistic, also wanted him to go to [employer] in person, to get intel by looking at their computers. Volatile yells calls people names. No one at company has ever had a raise.    Thoracic aneurysm, has follow up with Dr Cota in June, last CT chest April 2023    Review of Systems  See ROS in HPI    No angina, has same occ atypical cp which has been evaluated and is non cardiac.  Cardiac: No  palpitations, increased abebe  Resp: No sob, wheezing, see above re cough which is acting up a little  Extremities: No leg or ankle swelling, no claudication  Neuro: No dizziness, syncope, blurred vision. No new headaches. Has hx migraine ha's     Current Outpatient Medications   Medication Instructions    albuterol 90 mcg/actuation inhaler Inhale 1-2 puffs every 4-6 hours as needed    azelastine (Astelin) 137 mcg (0.1 %) nasal spray 2 sprays, Each Nostril, 2 times daily    benzonatate (TESSALON) 100 mg, oral,  3 times daily PRN, Do not crush or chew.    buPROPion XL (WELLBUTRIN XL) 300 mg, oral, Every morning, Take 1 tablet daily with food in AM    cholecalciferol (VITAMIN D-3) 50 mcg, oral, Daily    cyanocobalamin (Vitamin B-12) 1,000 mcg tablet 1 tablet, oral, Daily    diclofenac sodium 1 % kit Apply to right knee 2 grms twice daily    DOCUSATE CALCIUM ORAL oral, Nightly PRN    escitalopram (LEXAPRO) 2.5 mg, oral, Daily    esomeprazole (NEXIUM) 40 mg, oral, 2 times daily, Take 1/2 hour before breakfast or lunch,and take at bedtime . Must be  from thyroid med by at least  one hour.    ferrous sulfate 325 (65 Fe) MG tablet oral, Take 1 tablet daily with food three days per week with supper. Do not take 3 hours of thyroid pill    fluticasone (Flonase) 50 mcg/actuation nasal spray 2 sprays, Each Nostril, Daily, Shake gently. Before first use, prime pump. After use, clean tip and replace cap.    fluticasone (Flovent Diskus) 50 mcg/actuation diskus inhaler 1 puff, inhalation, 2 times daily RT, Rinse mouth with water after use to reduce aftertaste and incidence of candidiasis. Do not swallow.    glucosamine HCl 750 mg tablet 2 tablets, oral, Daily    hydrocortisone (ANUSOL-HC) 25 mg, rectal, 2 times daily PRN, Insert 1 suppository rectally at bedtime for 7-12 days    levothyroxine (SYNTHROID, LEVOXYL) 75 mcg, oral, Daily before breakfast, Take by itself, on an empty stomach and nothing but water for one hour after.    LORazepam (ATIVAN) 0.5 mg, oral, 1 tab daily if needed for stressful situations    magnesium oxide (MAG-OX) 250 mg, oral, 2 times daily, Avoid taking close to iron    multivitamin tablet 1 tablet, oral, Daily    polyethylene glycol (Glycolax) 17 gram/dose powder oral, Daily RT, Mix 1 packet in 8 ounces of liquid and drink once daily    psyllium (Metamucil) powder oral, Use as directed    rosuvastatin (CRESTOR) 20 mg, oral, Daily    tamsulosin (FLOMAX) 0.4 mg, oral, 2 times daily    valsartan (DIOVAN) 320  "mg, oral, Daily     Allergies   Allergen Reactions    Codeine Unknown    Morphine Headache     Objective   Lab Results   Component Value Date    WBC 6.8 12/06/2023    HGB 13.9 12/06/2023    HCT 42.6 12/06/2023     12/06/2023    CHOL 230 (H) 12/06/2023    TRIG 198 (H) 12/06/2023    HDL 62.8 12/06/2023    LDLDIRECT 143 (H) 12/06/2023    ALT 47 12/06/2023    AST 37 12/06/2023     12/06/2023    K 4.3 12/06/2023     12/06/2023    CREATININE 1.06 12/06/2023    BUN 18 12/06/2023    CO2 28 12/06/2023    TSH 5.14 (H) 12/06/2023    PSA 0.42 12/06/2023     Stress test January 2024 reviewed. Negative.  Physical ExamBP 122/72 (BP Location: Left arm, BP Cuff Size: Large adult)   Pulse 99   Ht 1.829 m (6')   Wt 111 kg (244 lb 9.6 oz)   BMI 33.17 kg/m²       6/12/2023     4:10 PM 6/12/2023     4:11 PM 7/26/2023     8:22 AM 10/25/2023    11:17 AM 12/11/2023     8:06 AM 3/18/2024     8:03 AM 3/18/2024     8:50 AM   Vitals   Systolic 137 154 109 138 118 141 122   Diastolic 84 84 86 92 78 91 72   Heart Rate 89 80 79 64 72 99    Temp 36 °C (96.8 °F)         Resp 16 17  18 18     Height (in) 1.854 m (6' 1\")  1.842 m (6' 0.5\") 1.854 m (6' 1\") 1.829 m (6') 1.829 m (6')    Weight (lb) 244  237.4 250 242 244.6    BMI 32.19 kg/m2  31.75 kg/m2 32.98 kg/m2 32.82 kg/m2 33.17 kg/m2    BSA (m2) 2.39 m2  2.35 m2 2.41 m2 2.36 m2 2.37 m2    Visit Report   Report Report Report Report Report       Patient looks well and is in no obvious distress.  HEENT:   Normocephalic, no facial asymmetry  Eyes: Sclera and conjunctiva are clear.  Neck: No adenopathy cervical (Ant/post/lat), no Supraclavicular nodes.   Thyroid normal.   Carotid pulses normal, no carotid bruits.  Lungs : RR normal. Clear to auscultation anterior, posterior and lateral. No rales, wheezes rhonchi or rubs. Good air exchange.  Heart: RRR. No Murmur, gallop, click or rub.  Abdomen: Bowel sounds normal, No bruits. No pulsatile mass. No hepatosplenomegaly, masses or " tenderness. Soft, no guarding.  Vascular:  Posterior tibialis and dorsalis pedis pulses within normal limits bilaterally.   Extremities: no upper extremity edema. No lower extremity edema.   Musculoskeletal: no synovitis of joints seen. No new deformity.  Neuro: CN 2-12 intact. Alert, appropriate.   Ambulates independently.  No gross motor deficit.   No tremors.  Psych: normal mood and affect.  Skin: No rash, bruising petechiae or jaundice.          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)    Elevated LDL cholesterol level    Hypothyroidism    Mild episode of recurrent major depressive disorder (CMS/HCC)    Mild persistent asthma without complication    Reactive airway disease    Relevant Medications    fluticasone (Flovent Diskus) 50 mcg/actuation diskus inhaler    SOBOE (shortness of breath on exertion)    Relevant Medications    albuterol 90 mcg/actuation inhaler     Other Visit Diagnoses       Viral URI with cough    -  Primary    Relevant Medications    benzonatate (Tessalon) 100 mg capsule    Need for diphtheria-tetanus-pertussis (Tdap) vaccine        Vaccine counseling                  See wrap up and orders section for patient instructions and  additional treatment plan.   Patient Instructions:      Fasting blood test soon to check on the rosuvastatin    Flovent disc:  One inhalation am and pm EVERY DAY until over the current cough and then drop to once per day, but use it every day.  This is to keep mild asthma under control.  If coughing  more, use the albuterol.    Tessalon perls -swallow one every 8 hours as needed for cough.    When cuadra are over this current illness, obtain a TDAP at the pharmacy.    1-2 weeks after that, obtain a Prevnar 20 vaccine at the pharmacy.    Keep appt  for physical in June.    --------------------  44 min exam room  Prep 12 min

## 2024-03-18 ENCOUNTER — OFFICE VISIT (OUTPATIENT)
Dept: PRIMARY CARE | Facility: CLINIC | Age: 64
End: 2024-03-18
Payer: COMMERCIAL

## 2024-03-18 VITALS
SYSTOLIC BLOOD PRESSURE: 122 MMHG | BODY MASS INDEX: 33.13 KG/M2 | HEART RATE: 99 BPM | WEIGHT: 244.6 LBS | DIASTOLIC BLOOD PRESSURE: 72 MMHG | HEIGHT: 72 IN

## 2024-03-18 DIAGNOSIS — E06.3 HYPOTHYROIDISM DUE TO HASHIMOTO'S THYROIDITIS: ICD-10-CM

## 2024-03-18 DIAGNOSIS — E03.8 HYPOTHYROIDISM DUE TO HASHIMOTO'S THYROIDITIS: ICD-10-CM

## 2024-03-18 DIAGNOSIS — F33.0 MILD EPISODE OF RECURRENT MAJOR DEPRESSIVE DISORDER (CMS-HCC): ICD-10-CM

## 2024-03-18 DIAGNOSIS — J06.9 VIRAL URI WITH COUGH: Primary | ICD-10-CM

## 2024-03-18 DIAGNOSIS — I25.10 CORONARY ARTERY CALCIFICATION SEEN ON CAT SCAN: ICD-10-CM

## 2024-03-18 DIAGNOSIS — E72.12 HOMOZYGOUS FOR C677T POLYMORPHISM OF MTHFR (MULTI): ICD-10-CM

## 2024-03-18 DIAGNOSIS — R06.02 SOBOE (SHORTNESS OF BREATH ON EXERTION): ICD-10-CM

## 2024-03-18 DIAGNOSIS — Z23 NEED FOR DIPHTHERIA-TETANUS-PERTUSSIS (TDAP) VACCINE: ICD-10-CM

## 2024-03-18 DIAGNOSIS — R07.89 ATYPICAL CHEST PAIN: ICD-10-CM

## 2024-03-18 DIAGNOSIS — E78.00 ELEVATED LDL CHOLESTEROL LEVEL: ICD-10-CM

## 2024-03-18 DIAGNOSIS — I71.21 ANEURYSM OF ASCENDING AORTA WITHOUT RUPTURE (CMS-HCC): ICD-10-CM

## 2024-03-18 DIAGNOSIS — I10 BENIGN ESSENTIAL HYPERTENSION: ICD-10-CM

## 2024-03-18 DIAGNOSIS — J45.30 MILD PERSISTENT REACTIVE AIRWAY DISEASE WITHOUT COMPLICATION (HHS-HCC): ICD-10-CM

## 2024-03-18 DIAGNOSIS — J45.30 MILD PERSISTENT ASTHMA WITHOUT COMPLICATION (HHS-HCC): ICD-10-CM

## 2024-03-18 DIAGNOSIS — Z71.85 VACCINE COUNSELING: ICD-10-CM

## 2024-03-18 PROBLEM — F10.21 HISTORY OF ALCOHOL DEPENDENCE (MULTI): Status: RESOLVED | Noted: 2023-02-17 | Resolved: 2024-03-18

## 2024-03-18 PROBLEM — E72.10 DISORDER OF SULFUR-BEARING AMINO ACID METABOLISM (MULTI): Status: ACTIVE | Noted: 2024-03-18

## 2024-03-18 PROCEDURE — 3078F DIAST BP <80 MM HG: CPT | Performed by: INTERNAL MEDICINE

## 2024-03-18 PROCEDURE — 3008F BODY MASS INDEX DOCD: CPT | Performed by: INTERNAL MEDICINE

## 2024-03-18 PROCEDURE — 3074F SYST BP LT 130 MM HG: CPT | Performed by: INTERNAL MEDICINE

## 2024-03-18 PROCEDURE — 99215 OFFICE O/P EST HI 40 MIN: CPT | Performed by: INTERNAL MEDICINE

## 2024-03-18 RX ORDER — ALBUTEROL SULFATE 90 UG/1
AEROSOL, METERED RESPIRATORY (INHALATION)
Qty: 18 G | Refills: 11 | Status: SHIPPED | OUTPATIENT
Start: 2024-03-18

## 2024-03-18 RX ORDER — BENZONATATE 100 MG/1
100 CAPSULE ORAL 3 TIMES DAILY PRN
Qty: 42 CAPSULE | Refills: 0 | Status: SHIPPED | OUTPATIENT
Start: 2024-03-18 | End: 2024-04-17

## 2024-03-18 RX ORDER — FLUTICASONE PROPIONATE 50 UG/1
1 POWDER, METERED RESPIRATORY (INHALATION)
Qty: 3 EACH | Refills: 3 | Status: SHIPPED | OUTPATIENT
Start: 2024-03-18 | End: 2025-03-18

## 2024-03-18 ASSESSMENT — PAIN SCALES - GENERAL: PAINLEVEL: 0-NO PAIN

## 2024-03-18 NOTE — PATIENT INSTRUCTIONS
Fasting blood test soon to check on the rosuvastatin    Flovent disc:  One inhalation am and pm EVERY DAY until over the current cough and then drop to once per day, but use it every day.  This is to keep mild asthma under control.  If coughing  more, use the albuterol.    Tessalon perls -swallow one every 8 hours as needed for cough.    When cuadra are over this current illness, obtain a TDAP at the pharmacy.    1-2 weeks after that, obtain a Prevnar 20 vaccine at the pharmacy.    Keep appt for physical in June.

## 2024-04-01 DIAGNOSIS — K21.9 GASTROESOPHAGEAL REFLUX DISEASE, UNSPECIFIED WHETHER ESOPHAGITIS PRESENT: ICD-10-CM

## 2024-04-01 RX ORDER — ESOMEPRAZOLE MAGNESIUM 40 MG/1
40 CAPSULE, DELAYED RELEASE ORAL 2 TIMES DAILY
Qty: 180 CAPSULE | Refills: 1 | Status: SHIPPED | OUTPATIENT
Start: 2024-04-01

## 2024-04-01 NOTE — TELEPHONE ENCOUNTER
Med refill    esomeprazole (NexIUM) 40 mg DR capsule   Take 1 capsule (40 mg) by mouth 2 times a day. Take 1/2 hour before breakfast or lunch,and take at bedtime . Must be  from thyroid med by at least one hour.     [facility]

## 2024-05-07 ENCOUNTER — APPOINTMENT (OUTPATIENT)
Dept: SLEEP MEDICINE | Facility: CLINIC | Age: 64
End: 2024-05-07
Payer: COMMERCIAL

## 2024-06-12 ENCOUNTER — APPOINTMENT (OUTPATIENT)
Dept: CARDIOLOGY | Facility: CLINIC | Age: 64
End: 2024-06-12
Payer: COMMERCIAL

## 2024-06-13 DIAGNOSIS — I71.21 ANEURYSM OF ASCENDING AORTA WITHOUT RUPTURE (CMS-HCC): ICD-10-CM

## 2024-06-14 NOTE — PROGRESS NOTES
Subjective   Patient ID: Livan Wen is a 64 y.o. male who presents for annual physical and follow up on conditions.  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)    Elevated LDL cholesterol level    Hypothyroidism    Mild episode of recurrent major depressive disorder (CMS/HCC)    Mild persistent asthma without complication    Reactive airway disease    Relevant Medications    fluticasone (Flovent Diskus) 50 mcg/actuation diskus inhaler    SOBOE (shortness of breath on exertion)    Relevant Medications    albuterol 90 mcg/actuation inhaler     Other Visit Diagnoses       Viral URI with cough    -  Primary    Relevant Medications    benzonatate (Tessalon) 100 mg capsule    Need for diphtheria-tetanus-pertussis (Tdap) vaccine        Vaccine counseling       See wrap up and orders section for patient instructions and  additional treatment plan.   Patient Instructions:  Fasting blood test soon to check on the rosuvastatin  Flovent disc:  One inhalation am and pm EVERY DAY until over the current cough and then drop to once per day, but use it every day.  This is to keep mild asthma under control.  If coughing  more, use the albuterol.  Tessalon perls -swallow one every 8 hours as needed for cough.  When cuadra are over this current illness, obtain a TDAP at the pharmacy.  1-2 weeks after that, obtain a Prevnar 20 vaccine at the pharmacy.  Keep appt for physical in June.  --------------------  44 min exam room  Prep 12 min    END INFO FROM PRIOR VISIT  HPI  Here for annual check up and follow up on chronic med issues.  Had labs after last visit to check on crestor and thyroid-NOT COMPLETED.  ASTHMA  We added flovent daily to keep asthma under control  He was to get a tdap and  prevnar vaccine.  I see no record that these were done so we will do the tdap today and then he can get the prevnar. Hi last tetanus was .  He has now been retired for a couple of weeks, it is great, and is a new grandparent of a grandson. Born on fathers day earlier this week.  Scribe:  Patient is a 64-year old male who presents today for annual physical and follow up.    Carpal Tunnel:  He reports tingling in thumb and fingers on left hand.  He is wearing his carpal tunnel splint which is helping but has been wearing it during the day while working  We discussed not wearing the brace while working, but to wear at night while resting or in bed. 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    Right middle finger locking:  Catches / locks and cannot extend, PIP joint. We discussed can see ortho:He would like a referral to Orthopedics as it is affecting his activities (guitar playing).  Referral placed today.    Right knee pain:  Ongoing pain.  He does wear a brace.  He notes it has ligamentous laxity at times. Recommended weight training exercises for strengthening quadriceps and hamstrings. (50# weight limit, 3-5 sets of 15; sittingis going to be safer for him with his aortic aneurysm).    He notes lateral Hip Pain: off and on , now new. 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 No falls or injury. Points to the trochanter area and it band.  Showed the patient some stretching exercises to be done in the morning or at night.  If active during the day, stretching is beneficial at night.    Vaccines:  T-dap vaccine administered today in office. We discussed getting the Prevnar 20 vaccine in a week or two, given his asthma.  Recommended the RSV vaccine for the fall ( September or October), especially with his  grandson.    Weight Management:  Weight is decreasing. He is down 20 pounds since March.  He has reduced his alcohol intake. We have discussed best if he  stopped alcohol altogether.    ADAMARIS:  Has an appointment with sleep medicine in September 2024, using CPAP faithfully.    Asthma:  No concerns.  He is still using his Flovent once per day, regularly.  We discussed that if he has an asthma flare, he can use his albuterol inhaler as needed and increase his Flovent to twice per day.    Allergies:  He does use Astelin nasal spray on a regular basis.     Ascending aortic aneurysm:  He saw Cardiology, Dr. Cota (06/17/2024).  She ordered a CT in 1 year along with blood work.  We discussed keeping BP and cholesterol WNL along with reducing weight.  His LDL was 143 in 12/2023. Will re-check cholesterol and TSH.  Goal for LDL is 70 or lower.    Hypertension:  BP WNL today, 124/70. At home his BP readings were as follows: 120/85, 140/99, today 125/83.  Will stay on current regimen.  No changes today.    Dysphagia:  He reports that swallowing chicken without sauce can be difficult.  Denies choking or food getting stuck where it would cause him to vomit.  He has had esophageal dilatation in the past. We reviewed his last egd.which was just done 11-6-2023. He was to follow up with dr mcfarlane 3 months later, he has not made that appt. Recommended follow up appointment with Dr Mcfarlane for dysphagia.  It should have resolved after the last dilation.    Mood:  Stable on current meds..  Happy to be retired. He will drink 3 cups of coffee per day.    We reviewed and updated his medical, family and social history.    Medication list reviewed and updated.  Refills provided as needed.    Health Maintenance:  Colonoscopy in 2016 showed a tubular adenoma.  In 2019, colonoscopy showed a nodule of benign tissue.  Repeat colonoscopy in 5 years.      Social History:  Patient is retired now and has a new grand baby (boy; Suresh Oconnor) born on Father's day.    Review of Systems  All systems reviewed and are negative unless otherwise stated in HPI or noted in writing on the written   3   page history and review of systems document reviewed by me and  scanned in with this visit. This is scanned either to notes or to media, with today's date.    Current Outpatient Medications   Medication Instructions    albuterol 90 mcg/actuation inhaler Inhale 1-2 puffs every 4-6 hours as needed    azelastine (Astelin) 137 mcg (0.1 %) nasal spray 2 sprays, Each Nostril, 2 times daily PRN    buPROPion XL (WELLBUTRIN XL) 300 mg, oral, Every morning, Take 1 tablet daily with food in AM    cholecalciferol (VITAMIN D-3) 50 mcg, oral, Daily    cyanocobalamin (Vitamin B-12) 1,000 mcg tablet 1 tablet, oral, Daily    diclofenac sodium 1 % kit Apply to right knee 2 grms twice daily    DOCUSATE CALCIUM ORAL oral, Nightly PRN    escitalopram (LEXAPRO) 2.5 mg, oral, Daily    esomeprazole (NEXIUM) 40 mg, oral, 2 times daily, Take 1/2 hour before breakfast or lunch,and take at bedtime . Must be  from thyroid med by at least  one hour.    ferrous sulfate 325 (65 Fe) MG tablet oral, Take 1 tablet daily with food three days per week with supper. Do not take 3 hours of thyroid pill    fluticasone (Flonase) 50 mcg/actuation nasal spray 2 sprays, Each Nostril, Daily, Shake gently. Before first use, prime pump. After use, clean tip and replace cap.    fluticasone (Flovent Diskus) 50 mcg/actuation diskus inhaler 1 puff, inhalation, 2 times daily RT, Rinse mouth with water after use to reduce aftertaste and incidence of candidiasis. Do not swallow.    glucosamine HCl 750 mg tablet 2 tablets, oral, Daily    hydrocortisone (ANUSOL-HC) 25 mg, rectal, 2 times daily PRN, Insert 1 suppository rectally at bedtime for 7-12 days    levothyroxine (SYNTHROID, LEVOXYL) 75 mcg, oral, Daily before breakfast, Take by itself, on an empty stomach and nothing but water for one hour after.    LORazepam (ATIVAN) 0.5 mg, oral, 1 tab daily if needed for stressful situations    magnesium oxide (MAG-OX) 250 mg, oral, 2 times daily, Avoid taking close to  "iron    multivitamin tablet 1 tablet, oral, Daily    polyethylene glycol (Glycolax) 17 gram/dose powder oral, Daily RT, Mix 1 packet in 8 ounces of liquid and drink once daily    psyllium (Metamucil) powder oral, Use as directed    rosuvastatin (CRESTOR) 20 mg, oral, Daily    tamsulosin (FLOMAX) 0.4 mg, oral, 2 times daily    valsartan (DIOVAN) 320 mg, oral, Daily     Allergies   Allergen Reactions    Codeine Unknown    Morphine Headache     Objective   Has orders from dr vargas for ct angio chest and abdomen for following aneurysm  Lab Results   Component Value Date    WBC 6.8 12/06/2023    HGB 13.9 12/06/2023    HCT 42.6 12/06/2023     12/06/2023    CHOL 230 (H) 12/06/2023    TRIG 198 (H) 12/06/2023    HDL 62.8 12/06/2023    LDLDIRECT 143 (H) 12/06/2023    ALT 47 12/06/2023    AST 37 12/06/2023     12/06/2023    K 4.3 12/06/2023     12/06/2023    CREATININE 1.06 12/06/2023    BUN 18 12/06/2023    CO2 28 12/06/2023    TSH 5.14 (H) 12/06/2023    PSA 0.42 12/06/2023     Physical Exam  /70 (BP Location: Left arm, Patient Position: Sitting, BP Cuff Size: Adult)   Pulse 64   Ht 1.829 m (6')   Wt 102 kg (224 lb)   BMI 30.38 kg/m²       10/25/2023    11:17 AM 12/11/2023     8:06 AM 3/18/2024     8:03 AM 3/18/2024     8:50 AM 6/17/2024     4:37 PM 6/17/2024     4:39 PM 6/19/2024     9:13 AM   Vitals   Systolic 138 118 141 122 153 142 124   Diastolic 92 78 91 72 89 105 70   Heart Rate 64 72 99  73  64   Resp 18 18        Height (in) 1.854 m (6' 1\") 1.829 m (6') 1.829 m (6')  1.829 m (6')  1.829 m (6')   Weight (lb) 250 242 244.6  221  224   BMI 32.98 kg/m2 32.82 kg/m2 33.17 kg/m2  29.97 kg/m2  30.38 kg/m2   BSA (m2) 2.41 m2 2.36 m2 2.37 m2  2.25 m2  2.28 m2   Visit Report Report Report Report Report Report Report Report     Weight is down 20 pds from march.    General: Patient is known to me, looks well, is in usual state of health, with  no obvious distress.  Head: normocephalic  Eyes: PERRL, " EOMI, no scleral icterus or conjunctival abnormality  Ears:Normal canals and TM's  Oropharynx: Well hydrated mucosa. no lesions, erythema. palate moves well.   Neck: No masses, no cervical (A/P/ or Lateral) adenopathy. Thyroid not enlarged and no nodules. Carotid pulses normal and no bruits.  Chest: Normal AP diameter. No supraclavicular adenopathy.  Lungs: Clear to auscultation: no rales , wheezes, rhonchi, rubs, examined bilaterally, ant/post /lateral. RR normal.  Heart: RRR. No MGCR S1 S2 normal.  Abd: BS normal, no bruits. No hepatosplenomegaly. No abdominal mass or tenderness. No distension.  Extremities: No upper extremity edema. No lower leg edema.No ischemia.   Joints: no synovitis seen. No deformities.  Right middle finger PIP joint slightly larger than the left.  Unable to get it to lock on exam.    Pulses: normal posterior tibialis pulses, normal dorsalis pedis pulses. normal radial pulses. Normal femoral pulses without bruits.  Neuro: CN 2-12 intact . Alert, oriented, appropriate.  No focal weakness observed. No tremors. Ambulation is normal .  Psych: Mood and affect normal. No cognitive deficits noted during this exam. Alert, oriented, appropriate.  Skin: No rash, petechiae or excessive bruising.  Lymph: no SC axillary or inguinal adenopathy    Livan was seen today for annual exam.  Diagnoses and all orders for this visit:  Encounter for annual physical exam (Primary)  Obstructive sleep apnea  -     azelastine (Astelin) 137 mcg (0.1 %) nasal spray; Administer 2 sprays into each nostril 2 times a day as needed for rhinitis.  -     fluticasone (Flonase) 50 mcg/actuation nasal spray; Administer 2 sprays into each nostril once daily. Shake gently. Before first use, prime pump. After use, clean tip and replace cap.  SOBOE (shortness of breath on exertion)  -     albuterol 90 mcg/actuation inhaler; Inhale 1-2 puffs every 4-6 hours as needed  Benign essential hypertension  -     valsartan (Diovan) 320 mg  tablet; Take 1 tablet (320 mg) by mouth once daily.  Hypothyroidism due to Hashimoto's thyroiditis  Elevated LDL cholesterol level  Coronary artery calcification seen on CAT scan  Gastroesophageal reflux disease, unspecified whether esophagitis present  Locking finger joint  -     Referral to Orthopaedic Surgery; Future  Carpal tunnel syndrome on left  -     Referral to Orthopaedic Surgery; Future  Need for Tdap vaccination  -     Tdap vaccine, age 7 years and older  (BOOSTRIX)  Esophageal dysphagia  Fatty liver  Generalized anxiety disorder  Mild persistent reactive airway disease without complication (HHS-HCC)      Annual  history and physical completed including  ROS, PE, review of chronic issues,   immunizations,   results of testing   and health maintenance.  Functionality and pain were assessed.   Cancer screening testing was addressed as appropriate-see patient discussion/orders.   Medications were discussed and reviewed/reconciled and refill need assessed/handled.       Patient Instructions:  Please get the fasting blood test in a month. Will check thyroid then as well-no medications vitamin or supplements that am until after blood is drawn.  Goal LDL cholesterol is 70 or below fyi.    TDAP done today.    You need the Prevnar 20 vaccine after 1 week. SCHEDULE NURSE VISIT.    Keep alcohol restricted.  Dental cleaning twice per year  Eye exam annual- Dr Christophe Meraz and his group     Flovent once per day regularly, is asthma starts to flare a little, use albuterol also and increase flovent to twice daily.    Make a follow up appt with Dr Mcfarlane because 1: he wanted to see you  in 3 months after the procedure in October and 2: you are still having some difficulty swallowing several times per week.    Your 5 year colonoscopy is due October 2024 fyi.    Follow up in 3 -4 months.    Referral to Ortho hand, Dr [facility] for right third finger and also have her evaluate left carpal tunnel  symptoms            Scribe Attestation  By signing my name below, I, Inessa Crabtree, attest that this documentation has been prepared under the direction and in the presence of Sonia Nguyễn MD. All medical record entries made by the Scribe were at my direction or personally dictated by me. I have reviewed the chart and agree that the record accurately reflects my personal performance of the history, physical exam, discussion and plan.

## 2024-06-17 ENCOUNTER — HOSPITAL ENCOUNTER (OUTPATIENT)
Dept: CARDIOLOGY | Facility: CLINIC | Age: 64
Discharge: HOME | End: 2024-06-17
Payer: COMMERCIAL

## 2024-06-17 ENCOUNTER — OFFICE VISIT (OUTPATIENT)
Dept: CARDIOLOGY | Facility: HOSPITAL | Age: 64
End: 2024-06-17
Payer: COMMERCIAL

## 2024-06-17 VITALS
SYSTOLIC BLOOD PRESSURE: 142 MMHG | HEART RATE: 73 BPM | OXYGEN SATURATION: 100 % | DIASTOLIC BLOOD PRESSURE: 105 MMHG | BODY MASS INDEX: 29.93 KG/M2 | HEIGHT: 72 IN | WEIGHT: 221 LBS

## 2024-06-17 DIAGNOSIS — E78.00 HYPERCHOLESTEROLEMIA: ICD-10-CM

## 2024-06-17 DIAGNOSIS — I71.20 THORACIC AORTIC ANEURYSM, WITHOUT RUPTURE, UNSPECIFIED (CMS-HCC): ICD-10-CM

## 2024-06-17 DIAGNOSIS — I71.21 ANEURYSM OF ASCENDING AORTA WITHOUT RUPTURE (CMS-HCC): ICD-10-CM

## 2024-06-17 DIAGNOSIS — I10 BENIGN ESSENTIAL HYPERTENSION: ICD-10-CM

## 2024-06-17 PROCEDURE — 93306 TTE W/DOPPLER COMPLETE: CPT

## 2024-06-17 PROCEDURE — 93306 TTE W/DOPPLER COMPLETE: CPT | Performed by: INTERNAL MEDICINE

## 2024-06-17 PROCEDURE — 99214 OFFICE O/P EST MOD 30 MIN: CPT | Performed by: INTERNAL MEDICINE

## 2024-06-17 PROCEDURE — 3079F DIAST BP 80-89 MM HG: CPT | Performed by: INTERNAL MEDICINE

## 2024-06-17 PROCEDURE — 3008F BODY MASS INDEX DOCD: CPT | Performed by: INTERNAL MEDICINE

## 2024-06-17 PROCEDURE — 3077F SYST BP >= 140 MM HG: CPT | Performed by: INTERNAL MEDICINE

## 2024-06-17 PROCEDURE — 1036F TOBACCO NON-USER: CPT | Performed by: INTERNAL MEDICINE

## 2024-06-17 NOTE — PATIENT INSTRUCTIONS
Continue medications as prescribed.  Check BP at home a couple times a day and take to appt with Dr. Nguyễn.  Ordered repeat Lipid panel.    See you back in 1 year with CTA chest, abd, pelvis.    Zuleyka Cota MD  Co-Director, Vascular Center  [facility] [place], [facility]   Kirk Gray Master Clinician in Fibromuscular Dysplasia and Vascular Care  Professor of Medicine  [facility]

## 2024-06-17 NOTE — LETTER
June 17, 2024     Sonia Nguyễn MD  [address]  [facility], [address]  [facility] [address]    Patient: Livan Wen   YOB: 1960   Date of Visit: 6/17/2024       Dear Dr. Sonia Nguyễn MD:    Thank you for referring Livan Wen to me for evaluation. Below are my notes for this consultation.  If you have questions, please do not hesitate to call me. I look forward to following your patient along with you.       Sincerely,     Zuleyka Cota MD      CC: No Recipients  ______________________________________________________________________________________    06/17/24    Cardiovascular Medicine    History of Present Illness  This terrific 64 year-old man is in f/u of ascending aortic aneurysm with multiple thoracic imaging studies over the years dating back to 2014 (see below) with some increase in TAA dimension. He has a chronic chest pain syndrome with prior stress testing done and has exertional dyspnea. He has had HTN for > 10 years and hyperlipidemia on pravastatin.      No hx of acute coronary syndrome or acute aortic syndrome. No known cardiac murmur. He has no family hx of aortic aneurysms or dissections. He has 4 children in their 20s and 30s.    He just became a grandfather in the past week!    Since I saw him last year, he had a stress test for chest pain per Dr. Nguyễn, again negative for ischemia.    He was switched to valsartan; Bps at home have diastolic readings 80s.     Patient Active Problem List   Diagnosis   • Abnormal chest xray   • Acquired deformity of chest and rib   • Allergic rhinitis   • Aneurysm of ascending aorta (CMS-HCC)   • Asbestos exposure   • Atypical chest pain   • Benign essential hypertension   • Benign prostatic hyperplasia with incomplete bladder emptying   • Chronic constipation   • Chronic pansinusitis   • Complex sleep apnea syndrome   • Obstructive sleep apnea   • Coronary artery calcification seen on CAT scan   • Hypercholesterolemia   •  Esophageal reflux   • Fatigue   • Fatty liver   • Gallbladder polyp   • Generalized anxiety disorder   • Homozygous MTHFR mutation C677T   • Hypothyroidism   • Incidental lung nodule, > 3mm and < 8mm   • Locking of left knee   • Mass of joint of right knee   • Medication side effect   • 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)   • Nasal dryness   • Nasal septal deviation   • Overweight   • PAC (premature atrial contraction)   • Palpitations   • Reactive airway disease (HHS-HCC)   • Right knee pain   • Vitamin B12 deficiency   • Vitamin D deficiency   • SOBOE (shortness of breath on exertion)   • Colicky RUQ abdominal pain   • Bleeding hemorrhoids   • Class 1 obesity with alveolar hypoventilation and body mass index (BMI) of 31.0 to 31.9 in adult (Multi)   • Hernia, abdominal   • History of severe acute respiratory syndrome coronavirus 2 (SARS-CoV-2) disease   • History of migraine headaches   • Tubular adenoma of colon   • Dizziness   • Elevated LDL cholesterol level   • Mild persistent asthma without complication (HHS-HCC)   • BMI 31.0-31.9,adult   • BMI 32.0-32.9,adult   • Body mass index (BMI) of 28.0 to 28.9 in adult   • 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 30 mL 1   • buPROPion XL (Wellbutrin XL) 300 mg 24 hr tablet Take 1 tablet (300 mg) by mouth once daily in the morning. Take 1 tablet daily with food in AM 90 tablet 1   • cholecalciferol (Vitamin D-3) 50 mcg (2,000 unit) capsule Take 1 capsule (50 mcg) by mouth once daily.     • cyanocobalamin (Vitamin B-12) 1,000 mcg tablet Take 1 tablet (1,000 mcg) by mouth once daily.     • diclofenac sodium 1 % kit Apply to right knee 2 grms twice daily     • DOCUSATE  CALCIUM ORAL Take by mouth as needed at bedtime.     • escitalopram (Lexapro) 5 mg tablet Take 0.5 tablets (2.5 mg) by mouth once daily. 45 tablet 1   • esomeprazole (NexIUM) 40 mg DR capsule Take 1 capsule (40 mg) by mouth 2 times a day. Take 1/2 hour before breakfast or lunch,and take at bedtime . Must be  from thyroid med by at least  one hour. 180 capsule 1   • ferrous sulfate 325 (65 Fe) MG tablet Take by mouth. Take 1 tablet daily with food three days per week with supper. Do not take 3 hours of thyroid pill     • fluticasone (Flovent Diskus) 50 mcg/actuation diskus inhaler Inhale 1 puff 2 times a day. Rinse mouth with water after use to reduce aftertaste and incidence of candidiasis. Do not swallow. 3 each 3   • glucosamine HCl 750 mg tablet Take 2 tablets by mouth once daily.     • hydrocortisone (Anusol-HC) 25 mg suppository Insert 1 suppository (25 mg) into the rectum 2 times a day as needed for hemorrhoids. Insert 1 suppository rectally at bedtime for 7-12 days 12 suppository 1   • levothyroxine (Synthroid, Levoxyl) 75 mcg tablet Take 1 tablet (75 mcg) by mouth once daily in the morning. Take before meals. Take by itself, on an empty stomach and nothing but water for one hour after. 30 tablet 0   • LORazepam (Ativan) 0.5 mg tablet Take 1 tablet (0.5 mg) by mouth. 1 tab daily if needed for stressful situations     • magnesium oxide (Mag-Ox) 250 mg magnesium tablet Take 1 tablet (250 mg) by mouth 2 times a day. Avoid taking close to iron 180 tablet 0   • multivitamin tablet Take 1 tablet by mouth once daily.     • polyethylene glycol (Glycolax) 17 gram/dose powder Take by mouth once daily. Mix 1 packet in 8 ounces of liquid and drink once daily     • psyllium (Metamucil) powder Take by mouth. Use as directed     • rosuvastatin (Crestor) 20 mg tablet Take 1 tablet (20 mg) by mouth once daily. 90 tablet 1   • tamsulosin (Flomax) 0.4 mg 24 hr capsule Take 1 capsule (0.4 mg) by mouth 2 times a day. 180  capsule 1   • valsartan (Diovan) 320 mg tablet Take 1 tablet (320 mg) by mouth once daily. 90 tablet 1   • fluticasone (Flonase) 50 mcg/actuation nasal spray Administer 2 sprays into each nostril once daily. Shake gently. Before first use, prime pump. After use, clean tip and replace cap. 48 g 3     No current facility-administered medications for this visit.     Allergies   Allergen Reactions   • Codeine Unknown   • Morphine Headache       Physical Exam  Patient Vitals for the past 24 hrs:   BP Pulse SpO2 Height Weight   06/17/24 1639 (!) 142/105 -- -- -- --   06/17/24 1637 153/89 73 100 % 1.829 m (6') 100 kg (221 lb)   HEENT: Benign. No Rigoberto's syndrome.  Neck: Supple. JVP not elevated.  Cardiac: Precordium quiet. +S1, S2, RRR; no murmur, rub, or gallop appreciated (no AS or AI heard)  Lungs: Clear to auscultation bilaterally.  Extr/skin: No open ulcers. No edema noted. Brisk LE pulses  Neuro: Speech normal. He is fully alert and oriented.  Normal affect    Echo done at [facility] today -- though not reported, Ascending aorta ~ 4.2 cms stable  Mild AI  Await final report    1.2024 Stress test  IMPRESSION:  1. Normal stress myocardial perfusion imaging.  2. Well-maintained left ventricular function.  77%      MACRO:  None    Signed by: Temo Lopez 1/3/2024 5:33 PM  Dictation workstation:   [id number]    12.2023 LDL on statin Rx  Component      Latest Ref Rng 12/6/2023   CHOLESTEROL      0 - 199 mg/dL 230 (H)    HDL CHOLESTEROL      mg/dL 62.8    Cholesterol/HDL Ratio 3.7    LDL Calculated      <=99 mg/dL 128 (H)    VLDL      0 - 40 mg/dL 40    TRIGLYCERIDES      0 - 149 mg/dL 198 (H)    Non HDL Cholesterol      0 - 149 mg/dL 167 (H)      Component      Latest Ref Rng 12/6/2023   LDL, Direct      0 - 129 mg/dL 143 (H)         Prior testing  6.2023 cardiac MRI 4.2 cms, mild AI, like trileaflet valve  4.2023 non contrast CT aorta reported 4.5 cms, but I think ~ 4.4  10.2021 Echo ascending aorta 4.1  cms  1.2014 coronary Ca++ score ascending aorta 3.8 cms  8.2018 stress echo -- ascending aorta ~ 3.9-4.0 cms  7.2019 coronary Ca++ score ascending aorta 4.2-4.3 cms  3.2022 CTA chest ascending aorta 4.2-4.3 cms     4.2014 MRI liver, abdominal aorta appears normal in size to above bifurcation.   MRI Cardiac Chest MRA w/wo contrast for Morph/Funct, Valve Dz, and great vessels 01Jun2023 03:44PM Zuleyka Cota   ORDER REVISED TO A TH MRI CARDIAC /CHEST MRA W/WO CONTRAST FOR MORPH/FUNCT, VALVE DZ AND GREAT VESSELS BY RADIOLOGIST; Original Order Number: [id number]       Assessment/Plan:     64 year-old man with longstanding HTN with ascending aortic enlargement dating back to ~ 2014, some slight expansion over time, current diameter variable based on modality, likely ~ 4.2 cms. He has mild AI; trileaflet valve. No high risk features.      I think his ascending aorta looks stable on echo to me on repeat ~ 4.2 cm though I am not sure this will be reported (my informal measurements). He has mild AI.    At this time, would continue conservative management. BP control is vital. He will monitor BP on valsartan 320 mg/day for next few days and if high would add hydrochlorothiazide 12.5 mg QD or equivalent. We need BP < 130/80 mm Hg at all times. I will obtain CTA in 1 year and scan chest/abd/pelvis to assess for AAA too.  Indication for aortic repair > 5.0 cm and hopefully he will never get there; no high risk features.    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 We talked about not lifting item > 35# repeatedly (such as 140 soybean bags 40# each!).    He has an atypical chest pain syndrome; glad to see recent stress test again negative.    I would love to see his LDL < 70 mg/dL given coronary CA++> 0 and lipid abnormalities. Dr. Nguyễn follows closely and can up titrate statin. I will ask him to get this checked again; LDL 120s-140s on his dose of  rosuvastatin is surprising.    RTC 1 year with CTA c/a/p. Congrats on becoming a grandpa!    Zuleyka Cota MD

## 2024-06-17 NOTE — PROGRESS NOTES
06/17/24    Cardiovascular Medicine    History of Present Illness  This terrific 64 year-old man is in f/u of ascending aortic aneurysm with multiple thoracic imaging studies over the years dating back to 2014 (see below) with some increase in TAA dimension. He has a chronic chest pain syndrome with prior stress testing done and has exertional dyspnea. He has had HTN for > 10 years and hyperlipidemia on pravastatin.      No hx of acute coronary syndrome or acute aortic syndrome. No known cardiac murmur. He has no family hx of aortic aneurysms or dissections. He has 4 children in their 20s and 30s.    He just became a grandfather in the past week!    Since I saw him last year, he had a stress test for chest pain per Dr. Nguyễn, again negative for ischemia.    He was switched to valsartan; Bps at home have diastolic readings 80s.     Patient Active Problem List   Diagnosis    Abnormal chest xray    Acquired deformity of chest and rib    Allergic rhinitis    Aneurysm of ascending aorta (CMS-HCC)    Asbestos exposure    Atypical chest pain    Benign essential hypertension    Benign prostatic hyperplasia with incomplete bladder emptying    Chronic constipation    Chronic pansinusitis    Complex sleep apnea syndrome    Obstructive sleep apnea    Coronary artery calcification seen on CAT scan    Hypercholesterolemia    Esophageal reflux    Fatigue    Fatty liver    Gallbladder polyp    Generalized anxiety disorder    Homozygous MTHFR mutation C677T    Hypothyroidism    Incidental lung nodule, > 3mm and < 8mm    Locking of left knee    Mass of joint of right knee    Medication side effect    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)    Nasal dryness    Nasal septal deviation    Overweight    PAC (premature atrial contraction)    Palpitations    Reactive airway disease (HHS-HCC)    Right knee pain    Vitamin B12 deficiency    Vitamin D deficiency    SOBOE (shortness of breath on exertion)    Colicky RUQ  abdominal pain    Bleeding hemorrhoids    Class 1 obesity with alveolar hypoventilation and body mass index (BMI) of 31.0 to 31.9 in adult (Multi)    Hernia, abdominal    History of severe acute respiratory syndrome coronavirus 2 (SARS-CoV-2) disease    History of migraine headaches    Tubular adenoma of colon    Dizziness    Elevated LDL cholesterol level    Mild persistent asthma without complication ([facility])    BMI 31.0-31.9,adult    BMI 32.0-32.9,adult    Body mass index (BMI) of 28.0 to 28.9 in adult    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 30 mL 1    buPROPion XL (Wellbutrin XL) 300 mg 24 hr tablet Take 1 tablet (300 mg) by mouth once daily in the morning. Take 1 tablet daily with food in AM 90 tablet 1    cholecalciferol (Vitamin D-3) 50 mcg (2,000 unit) capsule Take 1 capsule (50 mcg) by mouth once daily.      cyanocobalamin (Vitamin B-12) 1,000 mcg tablet Take 1 tablet (1,000 mcg) by mouth once daily.      diclofenac sodium 1 % kit Apply to right knee 2 grms twice daily      DOCUSATE CALCIUM ORAL Take by mouth as needed at bedtime.      escitalopram (Lexapro) 5 mg tablet Take 0.5 tablets (2.5 mg) by mouth once daily. 45 tablet 1    esomeprazole (NexIUM) 40 mg DR capsule Take 1 capsule (40 mg) by mouth 2 times a day. Take 1/2 hour before breakfast or lunch,and take at bedtime . Must be  from thyroid med by at least  one hour. 180 capsule 1    ferrous sulfate 325 (65 Fe) MG tablet Take by mouth. Take 1 tablet daily with food three days per week with supper. Do not take 3 hours of thyroid pill      fluticasone (Flovent Diskus) 50 mcg/actuation diskus inhaler Inhale 1 puff 2 times a day. Rinse mouth with water after use to  reduce aftertaste and incidence of candidiasis. Do not swallow. 3 each 3    glucosamine HCl 750 mg tablet Take 2 tablets by mouth once daily.      hydrocortisone (Anusol-HC) 25 mg suppository Insert 1 suppository (25 mg) into the rectum 2 times a day as needed for hemorrhoids. Insert 1 suppository rectally at bedtime for 7-12 days 12 suppository 1    levothyroxine (Synthroid, Levoxyl) 75 mcg tablet Take 1 tablet (75 mcg) by mouth once daily in the morning. Take before meals. Take by itself, on an empty stomach and nothing but water for one hour after. 30 tablet 0    LORazepam (Ativan) 0.5 mg tablet Take 1 tablet (0.5 mg) by mouth. 1 tab daily if needed for stressful situations      magnesium oxide (Mag-Ox) 250 mg magnesium tablet Take 1 tablet (250 mg) by mouth 2 times a day. Avoid taking close to iron 180 tablet 0    multivitamin tablet Take 1 tablet by mouth once daily.      polyethylene glycol (Glycolax) 17 gram/dose powder Take by mouth once daily. Mix 1 packet in 8 ounces of liquid and drink once daily      psyllium (Metamucil) powder Take by mouth. Use as directed      rosuvastatin (Crestor) 20 mg tablet Take 1 tablet (20 mg) by mouth once daily. 90 tablet 1    tamsulosin (Flomax) 0.4 mg 24 hr capsule Take 1 capsule (0.4 mg) by mouth 2 times a day. 180 capsule 1    valsartan (Diovan) 320 mg tablet Take 1 tablet (320 mg) by mouth once daily. 90 tablet 1    fluticasone (Flonase) 50 mcg/actuation nasal spray Administer 2 sprays into each nostril once daily. Shake gently. Before first use, prime pump. After use, clean tip and replace cap. 48 g 3     No current facility-administered medications for this visit.     Allergies   Allergen Reactions    Codeine Unknown    Morphine Headache       Physical Exam  Patient Vitals for the past 24 hrs:   BP Pulse SpO2 Height Weight   06/17/24 1639 (!) 142/105 -- -- -- --   06/17/24 1637 153/89 73 100 % 1.829 m (6') 100 kg (221 lb)   HEENT: Benign. No Rigoberto's syndrome.  Neck:  Supple. JVP not elevated.  Cardiac: Precordium quiet. +S1, S2, RRR; no murmur, rub, or gallop appreciated (no AS or AI heard)  Lungs: Clear to auscultation bilaterally.  Extr/skin: No open ulcers. No edema noted. Brisk LE pulses  Neuro: Speech normal. He is fully alert and oriented.  Normal affect    Echo done at [facility] today -- though not reported, Ascending aorta ~ 4.2 cms stable  Mild AI  Await final report    1.2024 Stress test  IMPRESSION:  1. Normal stress myocardial perfusion imaging.  2. Well-maintained left ventricular function.  77%      MACRO:  None    Signed by: Temo Lopez 1/3/2024 5:33 PM  Dictation workstation:   [id number]    12.2023 LDL on statin Rx  Component      Latest Ref Rng 12/6/2023   CHOLESTEROL      0 - 199 mg/dL 230 (H)    HDL CHOLESTEROL      mg/dL 62.8    Cholesterol/HDL Ratio 3.7    LDL Calculated      <=99 mg/dL 128 (H)    VLDL      0 - 40 mg/dL 40    TRIGLYCERIDES      0 - 149 mg/dL 198 (H)    Non HDL Cholesterol      0 - 149 mg/dL 167 (H)      Component      Latest Ref Rng 12/6/2023   LDL, Direct      0 - 129 mg/dL 143 (H)         Prior testing  6.2023 cardiac MRI 4.2 cms, mild AI, like trileaflet valve  4.2023 non contrast CT aorta reported 4.5 cms, but I think ~ 4.4  10.2021 Echo ascending aorta 4.1 cms  1.2014 coronary Ca++ score ascending aorta 3.8 cms  8.2018 stress echo -- ascending aorta ~ 3.9-4.0 cms  7.2019 coronary Ca++ score ascending aorta 4.2-4.3 cms  3.2022 CTA chest ascending aorta 4.2-4.3 cms     4.2014 MRI liver, abdominal aorta appears normal in size to above bifurcation.   MRI Cardiac Chest MRA w/wo contrast for Morph/Funct, Valve Dz, and great vessels 01Jun2023 03:44PM Zuleyka Cota   ORDER REVISED TO A TH MRI CARDIAC /CHEST MRA W/WO CONTRAST FOR MORPH/FUNCT, VALVE DZ AND GREAT VESSELS BY RADIOLOGIST; Original Order Number: [id number]       Assessment/Plan:     64 year-old man with longstanding HTN with ascending aortic enlargement dating back  to ~ 2014, some slight expansion over time, current diameter variable based on modality, likely ~ 4.2 cms. He has mild AI; trileaflet valve. No high risk features.      I think his ascending aorta looks stable on echo to me on repeat ~ 4.2 cm though I am not sure this will be reported (my informal measurements). He has mild AI.    At this time, would continue conservative management. BP control is vital. He will monitor BP on valsartan 320 mg/day for next few days and if high would add hydrochlorothiazide 12.5 mg QD or equivalent. We need BP < 130/80 mm Hg at all times. I will obtain CTA in 1 year and scan chest/abd/pelvis to assess for AAA too.  Indication for aortic repair > 5.0 cm and hopefully he will never get there; no high risk features.    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 We talked about not lifting item > 35# repeatedly (such as 140 soybean bags 40# each!).    He has an atypical chest pain syndrome; glad to see recent stress test again negative.    I would love to see his LDL < 70 mg/dL given coronary CA++> 0 and lipid abnormalities. Dr. Nguyễn follows closely and can up titrate statin. I will ask him to get this checked again; LDL 120s-140s on his dose of rosuvastatin is surprising.    RTC 1 year with CTA c/a/p. Congrats on becoming a grandpa!    Zuleyka Cota MD

## 2024-06-19 ENCOUNTER — APPOINTMENT (OUTPATIENT)
Dept: PRIMARY CARE | Facility: CLINIC | Age: 64
End: 2024-06-19
Payer: COMMERCIAL

## 2024-06-19 VITALS
HEIGHT: 72 IN | WEIGHT: 224 LBS | SYSTOLIC BLOOD PRESSURE: 124 MMHG | DIASTOLIC BLOOD PRESSURE: 70 MMHG | BODY MASS INDEX: 30.34 KG/M2 | HEART RATE: 64 BPM

## 2024-06-19 DIAGNOSIS — K76.0 FATTY LIVER: ICD-10-CM

## 2024-06-19 DIAGNOSIS — Z00.00 ENCOUNTER FOR ANNUAL PHYSICAL EXAM: Primary | ICD-10-CM

## 2024-06-19 DIAGNOSIS — E06.3 HYPOTHYROIDISM DUE TO HASHIMOTO'S THYROIDITIS: ICD-10-CM

## 2024-06-19 DIAGNOSIS — F41.1 GENERALIZED ANXIETY DISORDER: ICD-10-CM

## 2024-06-19 DIAGNOSIS — I10 BENIGN ESSENTIAL HYPERTENSION: ICD-10-CM

## 2024-06-19 DIAGNOSIS — E78.00 ELEVATED LDL CHOLESTEROL LEVEL: ICD-10-CM

## 2024-06-19 DIAGNOSIS — J45.30 MILD PERSISTENT REACTIVE AIRWAY DISEASE WITHOUT COMPLICATION (HHS-HCC): ICD-10-CM

## 2024-06-19 DIAGNOSIS — E03.8 HYPOTHYROIDISM DUE TO HASHIMOTO'S THYROIDITIS: ICD-10-CM

## 2024-06-19 DIAGNOSIS — R06.02 SOBOE (SHORTNESS OF BREATH ON EXERTION): ICD-10-CM

## 2024-06-19 DIAGNOSIS — G47.33 OBSTRUCTIVE SLEEP APNEA: ICD-10-CM

## 2024-06-19 DIAGNOSIS — Z23 NEED FOR TDAP VACCINATION: ICD-10-CM

## 2024-06-19 DIAGNOSIS — R13.19 ESOPHAGEAL DYSPHAGIA: ICD-10-CM

## 2024-06-19 DIAGNOSIS — M24.849 LOCKING FINGER JOINT: ICD-10-CM

## 2024-06-19 DIAGNOSIS — G56.02 CARPAL TUNNEL SYNDROME ON LEFT: ICD-10-CM

## 2024-06-19 DIAGNOSIS — I25.10 CORONARY ARTERY CALCIFICATION SEEN ON CAT SCAN: ICD-10-CM

## 2024-06-19 DIAGNOSIS — K21.9 GASTROESOPHAGEAL REFLUX DISEASE, UNSPECIFIED WHETHER ESOPHAGITIS PRESENT: ICD-10-CM

## 2024-06-19 RX ORDER — AZELASTINE 1 MG/ML
2 SPRAY, METERED NASAL 2 TIMES DAILY PRN
Qty: 30 ML | Refills: 2 | Status: SHIPPED | OUTPATIENT
Start: 2024-06-19

## 2024-06-19 RX ORDER — FLUTICASONE PROPIONATE 50 MCG
2 SPRAY, SUSPENSION (ML) NASAL DAILY
Qty: 48 G | Refills: 3 | Status: SHIPPED | OUTPATIENT
Start: 2024-06-19 | End: 2024-09-17

## 2024-06-19 RX ORDER — ALBUTEROL SULFATE 90 UG/1
AEROSOL, METERED RESPIRATORY (INHALATION)
Qty: 18 G | Refills: 11 | Status: SHIPPED | OUTPATIENT
Start: 2024-06-19

## 2024-06-19 RX ORDER — VALSARTAN 320 MG/1
320 TABLET ORAL DAILY
Qty: 90 TABLET | Refills: 3 | Status: SHIPPED | OUTPATIENT
Start: 2024-06-19 | End: 2025-06-19

## 2024-06-19 SDOH — ECONOMIC STABILITY: TRANSPORTATION INSECURITY
IN THE PAST 12 MONTHS, HAS THE LACK OF TRANSPORTATION KEPT YOU FROM MEDICAL APPOINTMENTS OR FROM GETTING MEDICATIONS?: NO

## 2024-06-19 SDOH — ECONOMIC STABILITY: FOOD INSECURITY: WITHIN THE PAST 12 MONTHS, YOU WORRIED THAT YOUR FOOD WOULD RUN OUT BEFORE YOU GOT MONEY TO BUY MORE.: NEVER TRUE

## 2024-06-19 SDOH — ECONOMIC STABILITY: HOUSING INSECURITY
IN THE LAST 12 MONTHS, WAS THERE A TIME WHEN YOU DID NOT HAVE A STEADY PLACE TO SLEEP OR SLEPT IN A SHELTER (INCLUDING NOW)?: NO

## 2024-06-19 SDOH — ECONOMIC STABILITY: TRANSPORTATION INSECURITY
IN THE PAST 12 MONTHS, HAS LACK OF TRANSPORTATION KEPT YOU FROM MEETINGS, WORK, OR FROM GETTING THINGS NEEDED FOR DAILY LIVING?: NO

## 2024-06-19 SDOH — ECONOMIC STABILITY: FOOD INSECURITY: WITHIN THE PAST 12 MONTHS, THE FOOD YOU BOUGHT JUST DIDN'T LAST AND YOU DIDN'T HAVE MONEY TO GET MORE.: NEVER TRUE

## 2024-06-19 SDOH — ECONOMIC STABILITY: HOUSING INSECURITY: IN THE LAST 12 MONTHS, HOW MANY PLACES HAVE YOU LIVED?: 1

## 2024-06-19 SDOH — ECONOMIC STABILITY: INCOME INSECURITY: IN THE LAST 12 MONTHS, WAS THERE A TIME WHEN YOU WERE NOT ABLE TO PAY THE MORTGAGE OR RENT ON TIME?: NO

## 2024-06-19 SDOH — HEALTH STABILITY: PHYSICAL HEALTH: ON AVERAGE, HOW MANY DAYS PER WEEK DO YOU ENGAGE IN MODERATE TO STRENUOUS EXERCISE (LIKE A BRISK WALK)?: 7 DAYS

## 2024-06-19 SDOH — HEALTH STABILITY: PHYSICAL HEALTH: ON AVERAGE, HOW MANY MINUTES DO YOU ENGAGE IN EXERCISE AT THIS LEVEL?: 60 MIN

## 2024-06-19 ASSESSMENT — SOCIAL DETERMINANTS OF HEALTH (SDOH)
WITHIN THE LAST YEAR, HAVE YOU BEEN HUMILIATED OR EMOTIONALLY ABUSED IN OTHER WAYS BY YOUR PARTNER OR EX-PARTNER?: NO
HOW HARD IS IT FOR YOU TO PAY FOR THE VERY BASICS LIKE FOOD, HOUSING, MEDICAL CARE, AND HEATING?: NOT HARD AT ALL
HOW OFTEN DO YOU ATTENT MEETINGS OF THE CLUB OR ORGANIZATION YOU BELONG TO?: MORE THAN 4 TIMES PER YEAR
WITHIN THE LAST YEAR, HAVE YOU BEEN KICKED, HIT, SLAPPED, OR OTHERWISE PHYSICALLY HURT BY YOUR PARTNER OR EX-PARTNER?: NO
HOW OFTEN DO YOU GET TOGETHER WITH FRIENDS OR RELATIVES?: MORE THAN THREE TIMES A WEEK
IN A TYPICAL WEEK, HOW MANY TIMES DO YOU TALK ON THE PHONE WITH FAMILY, FRIENDS, OR NEIGHBORS?: MORE THAN THREE TIMES A WEEK
HOW OFTEN DO YOU ATTEND CHURCH OR RELIGIOUS SERVICES?: MORE THAN 4 TIMES PER YEAR
WITHIN THE LAST YEAR, HAVE YOU BEEN AFRAID OF YOUR PARTNER OR EX-PARTNER?: NO
WITHIN THE LAST YEAR, HAVE TO BEEN RAPED OR FORCED TO HAVE ANY KIND OF SEXUAL ACTIVITY BY YOUR PARTNER OR EX-PARTNER?: NO
DO YOU BELONG TO ANY CLUBS OR ORGANIZATIONS SUCH AS CHURCH GROUPS UNIONS, FRATERNAL OR ATHLETIC GROUPS, OR SCHOOL GROUPS?: YES

## 2024-06-19 ASSESSMENT — LIFESTYLE VARIABLES
SKIP TO QUESTIONS 9-10: 1
HOW OFTEN DO YOU HAVE SIX OR MORE DRINKS ON ONE OCCASION: NEVER
HOW OFTEN DO YOU HAVE A DRINK CONTAINING ALCOHOL: NEVER
HOW MANY STANDARD DRINKS CONTAINING ALCOHOL DO YOU HAVE ON A TYPICAL DAY: PATIENT DOES NOT DRINK
AUDIT-C TOTAL SCORE: 0

## 2024-06-19 ASSESSMENT — COLUMBIA-SUICIDE SEVERITY RATING SCALE - C-SSRS
1. IN THE PAST MONTH, HAVE YOU WISHED YOU WERE DEAD OR WISHED YOU COULD GO TO SLEEP AND NOT WAKE UP?: NO
6. HAVE YOU EVER DONE ANYTHING, STARTED TO DO ANYTHING, OR PREPARED TO DO ANYTHING TO END YOUR LIFE?: NO
2. HAVE YOU ACTUALLY HAD ANY THOUGHTS OF KILLING YOURSELF?: NO

## 2024-06-19 ASSESSMENT — ANXIETY QUESTIONNAIRES
7. FEELING AFRAID AS IF SOMETHING AWFUL MIGHT HAPPEN: NOT AT ALL
IF YOU CHECKED OFF ANY PROBLEMS ON THIS QUESTIONNAIRE, HOW DIFFICULT HAVE THESE PROBLEMS MADE IT FOR YOU TO DO YOUR WORK, TAKE CARE OF THINGS AT HOME, OR GET ALONG WITH OTHER PEOPLE: NOT DIFFICULT AT ALL
2. NOT BEING ABLE TO STOP OR CONTROL WORRYING: NOT AT ALL
1. FEELING NERVOUS, ANXIOUS, OR ON EDGE: NOT AT ALL
3. WORRYING TOO MUCH ABOUT DIFFERENT THINGS: NOT AT ALL
5. BEING SO RESTLESS THAT IT IS HARD TO SIT STILL: NOT AT ALL
6. BECOMING EASILY ANNOYED OR IRRITABLE: NOT AT ALL
4. TROUBLE RELAXING: NOT AT ALL
GAD7 TOTAL SCORE: 0

## 2024-06-19 ASSESSMENT — PATIENT HEALTH QUESTIONNAIRE - PHQ9
2. FEELING DOWN, DEPRESSED OR HOPELESS: NOT AT ALL
SUM OF ALL RESPONSES TO PHQ9 QUESTIONS 1 & 2: 0
1. LITTLE INTEREST OR PLEASURE IN DOING THINGS: NOT AT ALL

## 2024-06-19 ASSESSMENT — PAIN SCALES - GENERAL: PAINLEVEL: 2

## 2024-06-19 NOTE — PATIENT INSTRUCTIONS
Please get the fasting blood test in a month. Will check thyroid then as well-no medications vitamin or supplements that am until after blood is drawn.  Goal LDL cholesterol is 70 or below fyi.    TDAP done today.    You need the Prevnar 20 vaccine after 1 week. SCHEDULE NURSE VISIT.    Keep alcohol restricted.  Dental cleaning twice per year  Eye exam annual- Dr Christophe Meraz and his group     Flovent once per day regularly, is asthma starts to flare a little, use albuterol also and increase flovent to twice daily.    Make a follow up appt with Dr Mcfarlane because 1: he wanted to see you  in 3 months after the procedure in October and 2: you are still having some difficulty swallowing several times per week.    Your 5 year colonoscopy is due October 2024 fyi.    Follow up in 3 -4 months.    Referral to Ortho hand, Dr [facility] for right third finger and also have her evaluate left carpal tunnel symptoms

## 2024-06-23 LAB
AORTIC VALVE PEAK VELOCITY: 1.18 M/S
AV PEAK GRADIENT: 5.6 MMHG
AVA (PEAK VEL): 3.69 CM2
EJECTION FRACTION APICAL 4 CHAMBER: 63.8
EJECTION FRACTION: 61 %
LEFT ATRIUM VOLUME AREA LENGTH INDEX BSA: 28.1 ML/M2
LEFT VENTRICLE INTERNAL DIMENSION DIASTOLE: 4.3 CM (ref 3.5–6)
LEFT VENTRICULAR OUTFLOW TRACT DIAMETER: 2.5 CM
MITRAL VALVE E/A RATIO: 1.04
MITRAL VALVE E/E' RATIO: 5.6
RIGHT VENTRICLE FREE WALL PEAK S': 11 CM/S
RIGHT VENTRICLE PEAK SYSTOLIC PRESSURE: 21.3 MMHG
TRICUSPID ANNULAR PLANE SYSTOLIC EXCURSION: 2.6 CM

## 2024-06-26 ENCOUNTER — APPOINTMENT (OUTPATIENT)
Dept: PRIMARY CARE | Facility: CLINIC | Age: 64
End: 2024-06-26
Payer: COMMERCIAL

## 2024-06-26 ENCOUNTER — CLINICAL SUPPORT (OUTPATIENT)
Dept: PRIMARY CARE | Facility: CLINIC | Age: 64
End: 2024-06-26
Payer: COMMERCIAL

## 2024-06-26 DIAGNOSIS — Z23 NEED FOR PNEUMOCOCCAL VACCINE: ICD-10-CM

## 2024-06-26 PROCEDURE — 90677 PCV20 VACCINE IM: CPT | Performed by: INTERNAL MEDICINE

## 2024-06-26 PROCEDURE — 90471 IMMUNIZATION ADMIN: CPT | Performed by: INTERNAL MEDICINE

## 2024-07-05 ENCOUNTER — APPOINTMENT (OUTPATIENT)
Dept: RADIOLOGY | Facility: CLINIC | Age: 64
End: 2024-07-05
Payer: COMMERCIAL

## 2024-07-12 ENCOUNTER — APPOINTMENT (OUTPATIENT)
Dept: RADIOLOGY | Facility: CLINIC | Age: 64
End: 2024-07-12
Payer: COMMERCIAL

## 2024-07-15 DIAGNOSIS — G47.33 OBSTRUCTIVE SLEEP APNEA: ICD-10-CM

## 2024-07-15 RX ORDER — TALC
250 POWDER (GRAM) TOPICAL 2 TIMES DAILY
Qty: 180 TABLET | Refills: 2 | Status: SHIPPED | OUTPATIENT
Start: 2024-07-15

## 2024-07-17 ENCOUNTER — TELEPHONE (OUTPATIENT)
Dept: PRIMARY CARE | Facility: CLINIC | Age: 64
End: 2024-07-17
Payer: COMMERCIAL

## 2024-07-17 NOTE — TELEPHONE ENCOUNTER
"Patient left message saying he went to the   [facility]   He was diagnosed with trigger finger in both hands and was given steroid shots.     He asked us to forward his \"carpal tunnel\" tests to her. I do not see anything in his chart that looks like it pertains to his hands.     Livan [phone]    "

## 2024-07-18 NOTE — TELEPHONE ENCOUNTER
Results on Carpal Tunnel(10/2023) report printed out and provided to front reception to fax to his [facility] per his request.

## 2024-07-25 ENCOUNTER — LAB (OUTPATIENT)
Dept: LAB | Facility: LAB | Age: 64
End: 2024-07-25
Payer: COMMERCIAL

## 2024-07-25 DIAGNOSIS — E78.00 ELEVATED LDL CHOLESTEROL LEVEL: ICD-10-CM

## 2024-07-25 DIAGNOSIS — E03.8 HYPOTHYROIDISM DUE TO HASHIMOTO'S THYROIDITIS: ICD-10-CM

## 2024-07-25 DIAGNOSIS — E06.3 HYPOTHYROIDISM DUE TO HASHIMOTO'S THYROIDITIS: ICD-10-CM

## 2024-07-25 DIAGNOSIS — E78.00 HYPERCHOLESTEROLEMIA: ICD-10-CM

## 2024-07-25 LAB
ALT SERPL W P-5'-P-CCNC: 23 U/L (ref 10–52)
AST SERPL W P-5'-P-CCNC: 20 U/L (ref 9–39)
CHOLEST SERPL-MCNC: 184 MG/DL (ref 0–199)
CHOLESTEROL/HDL RATIO: 2.9
HDLC SERPL-MCNC: 62.4 MG/DL
LDLC SERPL CALC-MCNC: 99 MG/DL
NON HDL CHOLESTEROL: 122 MG/DL (ref 0–149)
T4 FREE SERPL-MCNC: 1.38 NG/DL (ref 0.78–1.48)
TRIGL SERPL-MCNC: 115 MG/DL (ref 0–149)
TSH SERPL-ACNC: 4.31 MIU/L (ref 0.44–3.98)
VLDL: 23 MG/DL (ref 0–40)

## 2024-07-25 PROCEDURE — 36415 COLL VENOUS BLD VENIPUNCTURE: CPT

## 2024-07-25 PROCEDURE — 84460 ALANINE AMINO (ALT) (SGPT): CPT

## 2024-07-25 PROCEDURE — 84450 TRANSFERASE (AST) (SGOT): CPT

## 2024-07-25 PROCEDURE — 84439 ASSAY OF FREE THYROXINE: CPT

## 2024-07-25 PROCEDURE — 80061 LIPID PANEL: CPT

## 2024-07-25 PROCEDURE — 84443 ASSAY THYROID STIM HORMONE: CPT

## 2024-08-13 ENCOUNTER — TELEPHONE (OUTPATIENT)
Dept: PRIMARY CARE | Facility: CLINIC | Age: 64
End: 2024-08-13
Payer: COMMERCIAL

## 2024-08-13 NOTE — TELEPHONE ENCOUNTER
"Pt has called in and has tested positive for Covid 8/24/13. This was a home test. Symptoms started 8/12/24   First time having covid? no  Employed in Health Care? no  Traveled recently? No   Fever/chills/shaking? No (yesterday, but gone now)   Sore throat/hoarseness? no  Cough/sputum/color? no  SOB/wheezing? no  Nasal congestion? yes  Headache? yes  Loss of sense of smell/taste? no  Body aches/malaise? no  D/N/V? No    Pt does not want to take paxlovid, \"It is not that bad, I did have some diarrhea, but I'm okay, I took tylenol and I feel better.\" Pt was instructed to increase PO fluids and drink a gatorade or powerade at least nce a day if having diarrhea. Also reviewed diet for loose stools. Pt denies any other problems. Please advise.  "

## 2024-08-13 NOTE — TELEPHONE ENCOUNTER
Patient calling   Tested + for Covid yesterday  He feels ok, no fever  He does have a runny nose says he feels like he has a head cold.  Just thought he should let you know    Livan [phone]

## 2024-09-03 ENCOUNTER — OFFICE VISIT (OUTPATIENT)
Dept: SLEEP MEDICINE | Facility: CLINIC | Age: 64
End: 2024-09-03
Payer: COMMERCIAL

## 2024-09-03 VITALS
SYSTOLIC BLOOD PRESSURE: 134 MMHG | BODY MASS INDEX: 30.34 KG/M2 | HEIGHT: 72 IN | HEART RATE: 83 BPM | OXYGEN SATURATION: 93 % | WEIGHT: 224 LBS | DIASTOLIC BLOOD PRESSURE: 94 MMHG

## 2024-09-03 DIAGNOSIS — G47.33 OSA (OBSTRUCTIVE SLEEP APNEA): Primary | ICD-10-CM

## 2024-09-03 PROBLEM — E66.2 CLASS 1 OBESITY WITH ALVEOLAR HYPOVENTILATION AND BODY MASS INDEX (BMI) OF 31.0 TO 31.9 IN ADULT (MULTI): Status: RESOLVED | Noted: 2023-02-17 | Resolved: 2024-09-03

## 2024-09-03 PROBLEM — G47.39 COMPLEX SLEEP APNEA SYNDROME: Status: RESOLVED | Noted: 2023-02-16 | Resolved: 2024-09-03

## 2024-09-03 PROBLEM — G47.31 COMPLEX SLEEP APNEA SYNDROME: Status: RESOLVED | Noted: 2023-02-16 | Resolved: 2024-09-03

## 2024-09-03 PROBLEM — E66.811 CLASS 1 OBESITY WITH ALVEOLAR HYPOVENTILATION AND BODY MASS INDEX (BMI) OF 31.0 TO 31.9 IN ADULT: Status: RESOLVED | Noted: 2023-02-17 | Resolved: 2024-09-03

## 2024-09-03 PROCEDURE — 3080F DIAST BP >= 90 MM HG: CPT | Performed by: NURSE PRACTITIONER

## 2024-09-03 PROCEDURE — 1036F TOBACCO NON-USER: CPT | Performed by: NURSE PRACTITIONER

## 2024-09-03 PROCEDURE — 3008F BODY MASS INDEX DOCD: CPT | Performed by: NURSE PRACTITIONER

## 2024-09-03 PROCEDURE — 99214 OFFICE O/P EST MOD 30 MIN: CPT | Performed by: NURSE PRACTITIONER

## 2024-09-03 PROCEDURE — 3075F SYST BP GE 130 - 139MM HG: CPT | Performed by: NURSE PRACTITIONER

## 2024-09-03 ASSESSMENT — ENCOUNTER SYMPTOMS
LOSS OF SENSATION IN FEET: 0
DEPRESSION: 0
OCCASIONAL FEELINGS OF UNSTEADINESS: 0

## 2024-09-03 ASSESSMENT — SLEEP AND FATIGUE QUESTIONNAIRES
HOW LIKELY ARE YOU TO NOD OFF OR FALL ASLEEP WHILE LYING DOWN TO REST IN THE AFTERNOON WHEN CIRCUMSTANCES PERMIT: SLIGHT CHANCE OF DOZING
WORRIED_DISTRESSED_DUE_TO_SLEEP: NOT AT ALL NOTICEABLE
SLEEP_PROBLEM_INTERFERES_DAILY_ACTIVITIES: NOT AT ALL NOTICEABLE
HOW LIKELY ARE YOU TO NOD OFF OR FALL ASLEEP IN A CAR, WHILE STOPPED FOR A FEW MINUTES IN TRAFFIC: SLIGHT CHANCE OF DOZING
HOW LIKELY ARE YOU TO NOD OFF OR FALL ASLEEP WHEN YOU ARE A PASSENGER IN A CAR FOR AN HOUR WITHOUT A BREAK: SLIGHT CHANCE OF DOZING
DIFFICULTY_STAYING_ASLEEP: MILD
ESS-CHAD TOTAL SCORE: 5
SITING INACTIVE IN A PUBLIC PLACE LIKE A CLASS ROOM OR A MOVIE THEATER: WOULD NEVER DOZE
HOW LIKELY ARE YOU TO NOD OFF OR FALL ASLEEP WHILE SITTING AND TALKING TO SOMEONE: WOULD NEVER DOZE
SLEEP_PROBLEM_NOTICEABLE_TO_OTHERS: A LITTLE
HOW LIKELY ARE YOU TO NOD OFF OR FALL ASLEEP WHILE SITTING AND READING: MODERATE CHANCE OF DOZING
SATISFACTION_WITH_CURRENT_SLEEP_PATTERN: VERY SATISFIED
HOW LIKELY ARE YOU TO NOD OFF OR FALL ASLEEP WHILE WATCHING TV: WOULD NEVER DOZE
HOW LIKELY ARE YOU TO NOD OFF OR FALL ASLEEP WHILE SITTING QUIETLY AFTER LUNCH WITHOUT ALCOHOL: WOULD NEVER DOZE

## 2024-09-03 NOTE — PROGRESS NOTES
Patient: Jones Wen    [id number]  : 1960 -- AGE 64 y.o.    Provider: NAFISA Regan     Location [facility]   Service Date: 9/3/2024              [facility]  Followup Visit Note    HISTORY OF PRESENT ILLNESS       HISTORY OF PRESENT ILLNESS   Jones Wen is a 64 y.o. male with past medical history of , aneurysm of ascending aorta, HTN, BPH, central sleep apnea, depression, hypothyroidism, reactive airway disease, migraine, asthma who presents to a [facility] for followup.     PAST SLEEP HISTORY  JONES was diagnosed with Severe sleep apnea   2018 -AHI 38- Effective control at BiPAP 10/6   2009 [facility] -split  night - AHI 10, titrated to BiPAP  due to cpap intolerance.   [facility] - AHI 73- 5 central apnea was, 166 hypopneas O2 sat connor 81%, titrated to 16/11    repeat titration: developed centrals at higher pressures, recs BiPAP ST 15/10 with rate 12    CURRENT SLEEP HISTORY    On today's visit, the patient reports doing well on PAP machine. Feels he cannot sleep without it. He has not received new supplies since we spoke last. Notes he has been busy helping his kids with remodeling and has a new grandchild born in . He has not registered his machine with [employer] for the recall yet.  Comfortable with his mask.   Falling asleep easily. Has a bed with built in massage which his helpful if he wakes to get back to sleep. Feeling rested  Denies RLS symptoms     RLS Followup:  denies     Insomnia follow up:  Bedtime: 11  Sleep Latency: 30 min  Awakenings: occasionally, wakes and uses his massage feature on his bed, helps him fall back to sleep easily  Wake time: 8  Naps:    --    ESS: 5   JOSE: 2  FOSQ: 39    REVIEW OF SYSTEMS     REVIEW OF SYSTEMS  Review of Systems   All other systems reviewed and are negative.        ALLERGIES AND MEDICATIONS      ALLERGIES  Allergies   Allergen Reactions    Codeine Unknown    Morphine Headache       MEDICATIONS  Current Outpatient Medications   Medication Sig Dispense Refill    albuterol 90 mcg/actuation inhaler Inhale 1-2 puffs every 4-6 hours as needed 18 g 11    azelastine (Astelin) 137 mcg (0.1 %) nasal spray Administer 2 sprays into each nostril 2 times a day as needed for rhinitis. 30 mL 2    buPROPion XL (Wellbutrin XL) 300 mg 24 hr tablet Take 1 tablet (300 mg) by mouth once daily in the morning. Take 1 tablet daily with food in AM 90 tablet 1    cholecalciferol (Vitamin D-3) 50 mcg (2,000 unit) capsule Take 1 capsule (50 mcg) by mouth once daily.      cyanocobalamin (Vitamin B-12) 1,000 mcg tablet Take 1 tablet (1,000 mcg) by mouth once daily.      diclofenac sodium 1 % kit Apply to right knee 2 grms twice daily      DOCUSATE CALCIUM ORAL Take by mouth as needed at bedtime.      escitalopram (Lexapro) 5 mg tablet Take 0.5 tablets (2.5 mg) by mouth once daily. (Patient taking differently: Take 1 tablet (5 mg) by mouth once daily.) 45 tablet 1    esomeprazole (NexIUM) 40 mg DR capsule Take 1 capsule (40 mg) by mouth 2 times a day. Take 1/2 hour before breakfast or lunch,and take at bedtime . Must be  from thyroid med by at least  one hour. 180 capsule 1    ferrous sulfate 325 (65 Fe) MG tablet Take by mouth. Take 1 tablet daily with food three days per week with supper. Do not take 3 hours of thyroid pill      fluticasone (Flonase) 50 mcg/actuation nasal spray Administer 2 sprays into each nostril once daily. Shake gently. Before first use, prime pump. After use, clean tip and replace cap. 48 g 3    fluticasone (Flovent Diskus) 50 mcg/actuation diskus inhaler Inhale 1 puff 2 times a day. Rinse mouth with water after use to reduce aftertaste and incidence of candidiasis. Do not swallow. 3 each 3    glucosamine HCl 750 mg tablet Take 2 tablets by mouth once daily.      hydrocortisone (Anusol-HC) 25 mg  suppository Insert 1 suppository (25 mg) into the rectum 2 times a day as needed for hemorrhoids. Insert 1 suppository rectally at bedtime for 7-12 days 12 suppository 1    levothyroxine (Synthroid, Levoxyl) 75 mcg tablet Take 1 tablet (75 mcg) by mouth once daily in the morning. Take before meals. Take by itself, on an empty stomach and nothing but water for one hour after. 30 tablet 0    LORazepam (Ativan) 0.5 mg tablet Take 1 tablet (0.5 mg) by mouth. 1 tab daily if needed for stressful situations      magnesium oxide (Mag-Ox) 250 mg magnesium tablet Take 1 tablet (250 mg) by mouth 2 times a day. Avoid taking close to iron 180 tablet 2    multivitamin tablet Take 1 tablet by mouth once daily.      polyethylene glycol (Glycolax) 17 gram/dose powder Take by mouth once daily. Mix 1 packet in 8 ounces of liquid and drink once daily      psyllium (Metamucil) powder Take by mouth. Use as directed      rosuvastatin (Crestor) 20 mg tablet Take 1 tablet (20 mg) by mouth once daily. 90 tablet 1    tamsulosin (Flomax) 0.4 mg 24 hr capsule Take 1 capsule (0.4 mg) by mouth 2 times a day. 180 capsule 1    valsartan (Diovan) 320 mg tablet Take 1 tablet (320 mg) by mouth once daily. 90 tablet 3     No current facility-administered medications for this visit.       PPAST MEDICAL HISTORY  Past Medical History:   Diagnosis Date    Abnormal findings on diagnostic imaging of liver and biliary tract 05/08/2014    Abnormal ultrasound of liver    Abnormal levels of other serum enzymes 07/07/2020    Alkaline phosphatase elevation    Abnormal levels of other serum enzymes 02/14/2019    Elevated alkaline phosphatase level    Acute maxillary sinusitis, unspecified 01/20/2016    Acute maxillary sinusitis    Acute serous otitis media, unspecified ear 10/17/2014    Acute serous otitis media    Acute sinusitis, unspecified 08/20/2012    Acute inflammation of sinus    Acute stress reaction 12/28/2020    Stress reaction    Acute upper respiratory  infection, unspecified 10/13/2015    Acute URI    Alcohol dependence, in remission (Multi) 02/05/2020    History of alcohol dependence    Alcohol dependence, in remission (Multi) 02/05/2020    History of alcohol dependence    Alcohol dependence, uncomplicated (Multi) 12/10/2018    Alcohol dependence, episodic    Anxiety disorder, unspecified 09/03/2019    Acute anxiety    Benign neoplasm of colon, unspecified 08/07/2016    Tubular adenoma of colon    Cough, unspecified 10/13/2015    Cough    Dysuria 08/20/2012    Dysuria    Elevated blood-pressure reading, without diagnosis of hypertension 03/30/2019    Borderline hypertension    Elevation of levels of liver transaminase levels 07/07/2020    Elevated transaminase level    Encounter for antibody response examination 08/10/2021    Immunity status testing    Encounter for immunization 08/14/2021    Need for prophylactic vaccination against hepatitis A and hepatitis B    Encounter for screening for other viral diseases 08/10/2021    Need for hepatitis C screening test    Epistaxis 03/20/2019    Epistaxis, recurrent    Esophageal dysphagia 06/19/2024    Esophageal obstruction 08/02/2016    Esophageal stricture    Flatulence 01/20/2016    Flatulence symptom    Generalized contraction of visual field, unspecified eye 12/10/2018    Tunnel vision, unspecified laterality    Headache, unspecified 12/10/2018    Headache, temporal    Hemoptysis 06/02/2022    Cough with hemoptysis    Hemorrhage of anus and rectum 12/15/2014    Rectal bleeding    Iliotibial band syndrome, right leg 10/15/2020    Iliotibial band syndrome of both sides    Migraine, unspecified, not intractable, without status migrainosus 04/23/2016    Migraine headache    Olecranon bursitis, right elbow 02/21/2017    Olecranon bursitis of right elbow    Otalgia, right ear 10/17/2014    Otalgia of right ear    Other bursitis, not elsewhere classified, unspecified site 11/08/2016    Traumatic bursitis    Other chest  pain 11/17/2015    Chest heaviness    Other conditions influencing health status 06/24/2013    (Lower) Leg Localized Swelling Unilateral    Other conditions influencing health status 08/25/2013    Rib Periostitis    Other long term (current) drug therapy 05/04/2021    Medication dose changed    Other long term (current) drug therapy 02/01/2021    Medication course changed    Other long term (current) drug therapy 07/07/2020    Medication dose changed    Other long term (current) drug therapy 08/14/2021    New medication added    Other specified abnormal findings of blood chemistry     Abnormal liver function test    Other specified disorders of muscle 10/15/2020    Hamstring tightness    Other specified disorders of nose and nasal sinuses 01/14/2021    Nasal crusting    Other specified disorders of nose and nasal sinuses 09/24/2015    Sinus pressure    Other specified disorders of nose and nasal sinuses 01/10/2019    Nasal dryness    Other specified problems related to primary support group 07/07/2020    Stress due to family tension    Otitis media, unspecified, left ear 01/20/2016    Otitis media, left    Pain in right foot 06/29/2017    Foot arch pain, right    Pain in unspecified wrist 05/11/2012    Pain, wrist joint    Personal history of colonic polyps     History of colonic polyps    Personal history of COVID-19 01/27/2021    History of severe acute respiratory syndrome coronavirus 2 (SARS-CoV-2) disease    Personal history of diseases of the blood and blood-forming organs and certain disorders involving the immune mechanism 12/06/2013    History of leukocytosis    Personal history of diseases of the blood and blood-forming organs and certain disorders involving the immune mechanism 08/14/2021    History of anemia    Personal history of diseases of the blood and blood-forming organs and certain disorders involving the immune mechanism 07/07/2020    History of anemia    Personal history of diseases of the blood  and blood-forming organs and certain disorders involving the immune mechanism 04/16/2020    History of iron deficiency anemia    Personal history of other (healed) physical injury and trauma     History of head injury    Personal history of other diseases of male genital organs 02/01/2021    History of epididymitis    Personal history of other diseases of male genital organs     History of prostatitis    Personal history of other diseases of the circulatory system 07/07/2020    History of hypotension    Personal history of other diseases of the circulatory system 09/04/2018    History of malignant hypertension    Personal history of other diseases of the digestive system     History of hemorrhoids    Personal history of other diseases of the digestive system     History of hiatal hernia    Personal history of other diseases of the digestive system     History of appendicitis    Personal history of other diseases of the nervous system and sense organs 10/12/2017    History of obstructive sleep apnea    Personal history of other diseases of the nervous system and sense organs 10/22/2017    History of acute otitis media    Personal history of other diseases of the respiratory system 12/21/2018    History of acute sinusitis    Personal history of other diseases of the respiratory system 03/07/2018    History of acute sinusitis    Personal history of other diseases of the respiratory system 06/30/2015    History of acute sinusitis    Personal history of other diseases of the respiratory system 03/01/2022    History of acute bronchitis    Personal history of other diseases of the respiratory system 12/10/2018    History of paranasal sinus congestion    Personal history of other diseases of the respiratory system 10/13/2015    History of acute bronchitis    Personal history of other diseases of the respiratory system 11/08/2016    History of acute sinusitis    Personal history of other diseases of the respiratory system      History of pleurisy    Personal history of other diseases of the respiratory system 01/30/2017    History of acute sinusitis    Personal history of other mental and behavioral disorders 12/30/2018    History of nightmares    Personal history of other specified conditions 02/01/2021    History of epistaxis    Personal history of other specified conditions 11/30/2022    History of wheezing    Personal history of other specified conditions 08/20/2012    History of fever    Personal history of other specified conditions 02/01/2021    History of dysphagia    Personal history of other specified conditions 02/01/2021    History of dysphagia    Personal history of pneumonia (recurrent)     History of pneumonia    Personal history of urinary (tract) infections     History of urinary tract infection    Radiculopathy, cervical region     Cervical radiculopathy    Retention of urine, unspecified 08/20/2012    Urinary retention    Right upper quadrant abdominal tenderness 05/11/2012    Abdominal tenderness, right upper quadrant    Right upper quadrant pain 05/04/2021    Colicky RUQ abdominal pain    Spermatocele of epididymis, unspecified     Spermatocele    Strain of muscle, fascia and tendon at neck level, initial encounter 06/30/2015    Cervical strain    Unspecified abdominal pain 05/04/2021    Intermittent abdominal pain    Unspecified abdominal pain 05/04/2021    Intermittent abdominal pain    Unspecified hemorrhoids 01/27/2020    Bleeding hemorrhoids    Unspecified hemorrhoids     Bleeding hemorrhoids    Unspecified internal derangement of right knee 11/08/2016    Knee locking, right    Unspecified nonsuppurative otitis media, left ear     Allergic otitis media of left ear, unspecified chronicity    Uvular swelling 02/16/2023       PAST SURGICAL HISTORY:  Past Surgical History:   Procedure Laterality Date    APPENDECTOMY  08/20/2012    Appendectomy    COLONOSCOPY  04/21/2012    Colonoscopy (Fiberoptic)     ESOPHAGOGASTRODUODENOSCOPY  2012    Diagnostic Esophagogastroduodenoscopy    EYE SURGERY  2015    Eye Surgery    KNEE ARTHROSCOPY W/ DEBRIDEMENT  2015    Arthroscopy Knee    NASAL SEPTUM SURGERY  2015    Nasal Septal Deviation Repair    OTHER SURGICAL HISTORY  2016    Esophageal Dilation    OTHER SURGICAL HISTORY  2012    Surgery Prostate Transurethral Destruction Prostate Tissue    VASECTOMY  2012    Surgery Vas Deferens Vasectomy       FAMILY HISTORY  Family History   Problem Relation Name Age of Onset    Sleep apnea Father      Other (parkinson disease) Father      Multiple sclerosis Sister      Brain cancer Sister  64    Multiple sclerosis Brother           suicide    Alzheimer's disease Other         FAMILY HISTORY: No changes since previous visit. Otherwise non-contributory as charted.       SOCIAL HISTORY  He  reports that he has never smoked. He has never been exposed to tobacco smoke. He quit smokeless tobacco use about 38 years ago.  His smokeless tobacco use included chew. He reports that he does not drink alcohol and does not use drugs.       PHYSICAL EXAM     VITAL SIGNS: BP (!) 134/94 (BP Location: Left arm, Patient Position: Sitting, BP Cuff Size: Adult long)   Pulse 83   Ht 1.829 m (6')   Wt 102 kg (224 lb)   SpO2 93%   BMI 30.38 kg/m²      PREVIOUS WEIGHTS:  Wt Readings from Last 3 Encounters:   24 102 kg (224 lb)   24 102 kg (224 lb)   24 100 kg (221 lb)       Physical Exam  Physical Exam   Constitutional: Alert and oriented, cooperative, no obvious distress   HEENT: Non icteric or anemic, EOM WNL bilaterally   Neck: Supple, no JVD, no goiter, no adenopathy, no rigidity  Chest: CTA bilaterally, no wheezing, crackles, rubs   Cardiac: RRR, S1 and S2, no murmur, rub, thrill   Abdomen: Obese, Soft, nontender, no masses, no organomegaly   Extremities: No clubbing, no LL edema   Neuromuscular: Cranial nerves grossly intact, no focal  deficits      RESULTS/DATA     Bicarbonate (mmol/L)   Date Value   12/06/2023 28   10/08/2022 28   08/26/2021 33 (H)   12/28/2020 30     Iron (ug/dL)   Date Value   08/26/2021 86   05/04/2021 83   12/28/2020 47     Iron Saturation (%)   Date Value   08/26/2021 21 (L)   05/04/2021 21 (L)   12/28/2020 10 (L)     TIBC (ug/dL)   Date Value   08/26/2021 419   05/04/2021 404   12/28/2020 482 (H)     Ferritin (ug/L)   Date Value   08/26/2021 60   12/28/2020 22   01/08/2020 28       No results found for this or any previous visit from the past 365 days.       PAP Adherence:          ASSESSMENT/PLAN     Mr. Wen is a 64 y.o. male and He returns in followup to the [facility] for ADAMARIS.    Problem List and Orders  Problem List Items Addressed This Visit             ICD-10-CM    ADAMARIS (obstructive sleep apnea) - Primary G47.33     Severe per diagnostic study in 2018 with recommendation for bilevel PAP at 10/6  -Well controlled on current settings  -continue current PAP settings  -Discussed [employer] recall again today. Phone number and website information provided in AVS to register for replacement. He verbalized understanding.   -Supply order updated today for [facility]. Instructed him to call for supplies. [facility] has previously contacted without success.   -RTC 12 mo or sooner if needed            Relevant Orders    Positive Airway Pressure (PAP) Therapy       Disposition    Return to clinic in 12 months     Follow up

## 2024-09-03 NOTE — ASSESSMENT & PLAN NOTE
Severe per diagnostic study in 2018 with recommendation for bilevel PAP at 10/6  -Well controlled on current settings  -continue current PAP settings  -Discussed [employer] recall again today. Phone number and website information provided in AVS to register for replacement. He verbalized understanding.   -Supply order updated today for [facility]. Instructed him to call for supplies. [facility] has previously contacted without success.   -RTC 12 mo or sooner if needed
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
## 2024-09-03 NOTE — PATIENT INSTRUCTIONS
""[employer]" SERVICE -[phone]    EFFECTIVE IMMEDIATELY:  If you have a Navjot Respironics PAP device, discontinue use if your device was manufactured prior to April 26, 2021 due to recall issued on June 14, 2021 from [employer] due to concern for potential risk for health issues related to foam used in specific Navjot PAP devices.    For more information, contact your DME for next steps and to verify the age of your device. If your device is more than 5 years old, you may be eligible for an updated device per insurance. You can also visit [employer]/SRC-update or call [phone].    I identified that my patient is using a device under the Navjot recall and advised them to register their device <<[email or web address].[email or web address]> and call [employer] for additional information if needed ([employer] number, [phone]). I advised that the company has not given a clear timeline for when patients will receive their machine.  Thus, I offered a prescription for a replacement machine but also recognize that insurance and resources may limit the ability to obtain a new device at this time.  The risk and benefits of continuations of using the current PAP therapy as well as treatment options were discussed with the patient, taking into consideration comorbidities, severity of symptoms, risks associated with PAP discontinuation, and safety-sensitive roles. The risk of the potential particulate exposure symptoms listed by the company were discussed, which includes respiratory issues, and possible toxic and carcinogenic effects. [employer] reports that the foam-related complaint rate in 2020 was low (0.03%)  and that \"The potential risks of chemical exposure due to off-gassing include headache, irritation, hypersensitivity, nausea/vomiting, and possible toxic and carcinogenic effects.\" [employer] reports that it \"has received no reports regarding patient impact related to chemical emissions\"            [facility] Sleep Medicine  [facility] [address]  [facility]  [address] [place] [address]       NAME: Livan Behzad   DATE:  09/03/24    DIAGNOSIS:   1. ADAMARIS (obstructive sleep apnea)  Positive Airway Pressure (PAP) Therapy          Thank you for coming to the Sleep Medicine Clinic today! Your sleep medicine provider today was: Nadiya De La Torre, GEOVANNY-CNP Below is a summary of your treatment plan, other important information, and our contact numbers:    TREATMENT PLAN:   - Follow-up in 12 months.  - If not already done, sign up for 'My Chart' and send prescription requests or messages through this    Annual Reminders About Your Sleep Apnea    Your sleep apnea is well controlled based on reviewing your PAP Data Report.     General Reminders:  Continue current machine settings. Continue using machine every night. Need at least 4 hours daily usage.   Remember to clean your mask, tubings, and water chamber regularly as instructed.  Know the replacement schedule of your supplies/ accessories and contact your DME (durable medical equipment) provider if you are due for them.  Avoid driving or operating heavy machinery when drowsy. A person driving while sleepy is 5 times more likely to have an accident. If you feel sleepy, pull over and take a short power nap (sleep for less than 30 minutes). Otherwise, ask somebody to drive you.    Follow-[facility] sooner through [facility] or calling our office if you have any of the following symptoms:  Snoring or stopping breathing while using the machine  Recurrence of fatigue, sleepiness, insomnia, and other symptoms you had prior using machine  Persistent or worsening fatigue or sleepiness despite regular use of machine  Issues tolerating the machine like bloating sensation, air hunger, too much hot air, too much pressure, taking off mask without recall in the middle of sleep, etc.     Other conservative measures to improve sleep apnea:  Losing weight can lead to  some improvement of ADAMARIS which means you will need lower pressures in machine to control your ADAMARIS. In some patients, they don't need to use the machine after bariatric surgery. Hence, consider medical and/or surgical weight loss especially if your BMI is more than 35.  Avoiding alcohol, sedative-hypnotics, and sedating medications is imperative as these substances can worsen snoring and sleep apnea  If you have nasal congestion or seasonal allergies, improving your breathing through the nose is critical for treating sleep apnea, tolerating CPAP, and improving your sleep; hence, using intranasal steroid spray like Flonase might help. Make sure you know the proper way to use it.  Stay off your back when sleeping.    Common issues with PAP machine:  Mask gets dislodged when turning to the side: Consider getting a CPAP pillow or switching to a mask with hose on top.     Dry mouth:  Your machine has built-in humidifier that heats up the air to prevent dry mouth. It can be adjusted to your comfort. You can try that first and increase setting one level one night at a time to check which setting is comfortable and effective in lessening dry mouth. If dry mouth persists despite humidity setting adjustment, may apply OTC Biotene gel over the gums at bedtime.  If Biotene gel is not effective, consider trying XEROSTOM gel from [employer].  Also, eliminate or reduce dose of meds that can cause dry mouth if possible. Lastly, may try getting a separate room humidifier machine.    Airleaks: Please call DME as they may need to adjust your mask or refit you with a different kind of mask. In addition, you can ask DME for tips on getting a good mask seal and mask fit.     Difficulty tolerating the mask: Contact your DME to try a different kind of mask and/or call office to get a referral to Sleep Psychologist for CPAP desensitization. CPAP desensitization technique is a set of strategies that helps patient cope with claustrophobia and  "anxiety related to wearing mask. Alternatively, we can do a daytime mini-sleep study called PAP-nap trial wherein you will try on different kinds of mask and the sleep technician will try different pressure settings on CPAP and BPAP machines to see which specific pressure is tolerable and comfortable for you.     Water droplets or moisture within the hose and/or mask: This is called rain-out and it is caused by condensation of too much heated humidity on the cooler walls of the hose. If you have rain-out, turn down humidity settings or get a heated hose. If you already have a heated hose, turn up the \"tube temperature\" of the heated hose. Alternatively, if you don't want to get a heated hose or warmer air, may wrap the CPAP hose with stockings to keep it somewhat warm. Also, you need to place the machine on the floor and lower the hose so that water won't travel upward towards your mask.     You can also go to the following EDUCATION WEBSITES for further information:   American Academy of Sleep Medicine http://sleepeducation.org  National Sleep Foundation: https://sleepfoundation.org  American Sleep Apnea Association: https://www.sleepapnea.org (for patients with sleep apnea)    Here at [facility], we wish you a restful sleep!     Instructions - Common ADAMARIS Recs: - For your sleep apnea, continue to use your PAP every night and use it whenever you are sleeping.   - Avoid alcohol or sedatives several hours prior to sleeping.   - Get additional supplies for your PAP (e.g., mask, hose, filters) every 3 months or as your insurance allows from your [employer] company. Replacement cushions for your PAP mask can be requested monthly if airseals are an issue.  - Remember to clean your mask, tubings, and water chamber regularly as instructed.  - Avoid driving or operating heavy machinery when drowsy. A person driving while sleepy is five (5) times more likely to have an accident. If you feel sleepy, pull over and take a " short power nap (sleep for less than 30 minutes). Otherwise, ask somebody to drive you.    EASY WAYS TO IMPROVE YOUR SLEEP:  1. Go to bed and wake up at the same time every day.   Aim for 8 hours but some people need less, some need more.   Get out of bed if you are not sleeping.   Limit naps to 20 min or less.   2. Expose yourself to daylight and/ or bright light in the morning.   Go outside or spend time near a window each morning.   You can use a light box (found on Amazon) if you wake before the sunrise.   Limit light exposure in the evenings (including electronic usage).   Try meditation, reading, stretching, deep breathing, warm shower or bath, or yoga nidra as part of your bedtime routine. There are many great FREE, videos or audio tracks on LocalOn/ farmaciamarket, etc for guidance.  3. Exercise, in some form, EVERY day, but not too close to bedtime. Consider making this part of your routine at the start of your day, followed by a cool shower.  4. Eat meals at roughly the same time every day. Make sure you are prioritizing fruits, vegetables, whole grains, lean proteins.  5. Time your caffeine intake. Make sure you are not drinking caffeine within 8 to 12 hours prior to your bedtime.   6. Avoid marijuana, alcohol, and nicotine. They will reduce sleep quality in any quantity.  7. Learn to manage anxiety. Psychology services at  can be reached at [phone] to schedule an appointment.     IMPORTANT INFORMATION:     Call 911 for medical emergencies.  Our offices are generally open from Monday-Friday, 9 am - 5 pm.  If you need to get in touch with me, you may either call me and my team(number is below) or you can use [employer].  If a referral for a test, for CPAP, or for another specialist was made, and you have not heard about scheduling this within a week, please call scheduling at [phone] ([phone]).  If you are unable to make your appointment for clinic or an overnight study, kindly call the office at least 48  hours in advance to cancel and reschedule.  If you are on CPAP, please bring your device's card to each clinic appointment unless told otherwise by your provider.  There are no supporting services by either the sleep doctors or their staff on weekends and Holidays, or after 5 PM on weekdays.   If you have been asked to come to a sleep study, make sure you bring toiletries, a comfy pillow, and any nighttime medications that you may regularly take. Also be sure to eat dinner before you arrive. We generally do not provide meals.      PRESCRIPTIONS:  We require 7 days advanced notice for prescription refills. If we do not receive the request in this time, we cannot guarantee that your medication will be refilled in time. Please contact the sleep nurses listed below for refills or request via [employer].     IMPORTANT PHONE NUMBERS:   Sleep Medicine Clinic Fax: [phone]  Appointments (for Pediatric Sleep Clinic): [phone] - option 1  Appointments (for Adult Sleep Clinic): [phone] - option 2  Appointments (For Sleep Studies): [phone] - option 3  Behavioral Sleep Medicine: [phone]  Sleep Surgery: [phone]  ENT (Otolaryngology): [phone]  Headache Clinic (Neurology): [phone]  Neurology: [phone]  Psychiatry: [phone]  Pulmonary Function Testing (PFT) Center: [phone]  Pulmonary Medicine: [phone]  [employer] (DME): [phone]  [employer] (DME): [phone]  [facility] ([facility]): [phone]    Our Adult Sleep Medicine Team (Please do not hesitate to call the office or sleep nurse with any questions between appointments):    Adult Sleep Nurses (Kaelyn Santos RN and Fanny Matute RN):  For clinical questions and refilling prescriptions: [phone]  Email sleep diaries and other documents at: [email or web address]    Adult Sleep Medicine Secretaries:  Mary Beth Guillermo (For Lisa/Marin/Jesus/Melvin/Lasha): P:  [phone]  F: [phone]  Memo Holly (Wil)Office: [phone] option 4 Office Fax: [phone]  Paulette Ferrara (For Cobb): P: [phone]  Fax: [phone]  Shirin Montiel (For Jurcevic/Blank): P: [phone]  F: [phone]  Phyllis Zapata (For [place]): P: [phone]  F: [phone]  Piper Valentine (For Margy/Lyssa/Zakhary): P: [phone]  F: [phone]  Vonnie Anderson (For Wall/Owen): P: [phone]  F: [phone]     Adult Sleep Medicine Advanced Practice Providers:  Darren Anand ([place], [place])  Vicki Omalley ([facility])  Nadiya De La Torre CNP ([place], [place], [place])  Linda Lee CNP ([place], [place], [place])  Alice Figueroa ([place], [place], [place])  Ace Owen CNP ([facility])      Our Sleep Testing Center (STC) Locations:  Our team will contact you to schedule your sleep study, however, you can contact us as follow:  Main Phone Line (scheduling only): [phone] ([phone]), option 3  Adult and Pediatric Locations  [facility] (6 years and older): [facility] - 4th floor ([address] [place] [address]) After hours line: [phone]  [facility] (Main campus: All ages): [facility], 6th floor. After hours line: [phone]   [place] (5 years and older; younger considered on case-by-case basis): [address], [address]. Scheduling  After hours line: [phone]   [place] (6 years and older): [address]; [address]; [address]   [place] (6 years and older): [address]  After hours line: [phone]   [place] (13 years and older) in [place]: [address], 2nd floor  After hours line: [phone]   [place] (13 year and older): [address]  After hours line: [phone] (Home studies out of [facility])    Adult Only Locations:   [place] (18 years and older):  "[address] [place] [address], 2nd floor   [place] (18 years and older): [address]; 4th floor  After hours line: [phone]  [facility] (18 years and older) at [place]: [address] [place] [address]  After hours line: [phone]        CONTACTING YOUR SLEEP MEDICINE PROVIDER:  Send a message directly to your provider through \"My Chart\", which is the email service through your  Records Account: [email or web address]   Call [phone] and leave a message. One of the administrative assistants will forward the message to your sleep medicine provider through \"My Chart\" and/or email.     Your sleep medicine provider for this visit was: GEOVANNY Regan-CNP    In the event that you are running more than 15 minutes late to your appointment, I will kindly ask you to reschedule.       "

## 2024-09-09 ENCOUNTER — APPOINTMENT (OUTPATIENT)
Dept: PRIMARY CARE | Facility: CLINIC | Age: 64
End: 2024-09-09
Payer: COMMERCIAL

## 2024-09-09 ENCOUNTER — HOSPITAL ENCOUNTER (OUTPATIENT)
Dept: RADIOLOGY | Facility: CLINIC | Age: 64
Discharge: HOME | End: 2024-09-09
Payer: COMMERCIAL

## 2024-09-09 VITALS
RESPIRATION RATE: 18 BRPM | WEIGHT: 236 LBS | HEART RATE: 64 BPM | SYSTOLIC BLOOD PRESSURE: 104 MMHG | HEIGHT: 73 IN | DIASTOLIC BLOOD PRESSURE: 64 MMHG | BODY MASS INDEX: 31.28 KG/M2

## 2024-09-09 DIAGNOSIS — S99.922A TOE INJURY, LEFT, INITIAL ENCOUNTER: ICD-10-CM

## 2024-09-09 DIAGNOSIS — I71.21 ANEURYSM OF ASCENDING AORTA WITHOUT RUPTURE (CMS-HCC): ICD-10-CM

## 2024-09-09 DIAGNOSIS — M79.89 PAIN AND SWELLING OF TOE, LEFT: ICD-10-CM

## 2024-09-09 DIAGNOSIS — M79.675 PAIN AND SWELLING OF TOE, LEFT: ICD-10-CM

## 2024-09-09 DIAGNOSIS — F33.0 MILD EPISODE OF RECURRENT MAJOR DEPRESSIVE DISORDER (CMS-HCC): ICD-10-CM

## 2024-09-09 DIAGNOSIS — E03.8 HYPOTHYROIDISM DUE TO HASHIMOTO'S THYROIDITIS: ICD-10-CM

## 2024-09-09 DIAGNOSIS — E06.3 HYPOTHYROIDISM DUE TO HASHIMOTO'S THYROIDITIS: ICD-10-CM

## 2024-09-09 DIAGNOSIS — H00.015 HORDEOLUM EXTERNUM OF LEFT LOWER EYELID: Primary | ICD-10-CM

## 2024-09-09 DIAGNOSIS — H01.005 BLEPHARITIS OF LEFT LOWER EYELID, UNSPECIFIED TYPE: ICD-10-CM

## 2024-09-09 DIAGNOSIS — R79.89 TSH ELEVATION: ICD-10-CM

## 2024-09-09 DIAGNOSIS — E78.00 ELEVATED LDL CHOLESTEROL LEVEL: ICD-10-CM

## 2024-09-09 PROCEDURE — 3078F DIAST BP <80 MM HG: CPT | Performed by: INTERNAL MEDICINE

## 2024-09-09 PROCEDURE — 73660 X-RAY EXAM OF TOE(S): CPT | Mod: LT

## 2024-09-09 PROCEDURE — 99214 OFFICE O/P EST MOD 30 MIN: CPT | Performed by: INTERNAL MEDICINE

## 2024-09-09 PROCEDURE — 3074F SYST BP LT 130 MM HG: CPT | Performed by: INTERNAL MEDICINE

## 2024-09-09 PROCEDURE — 73660 X-RAY EXAM OF TOE(S): CPT | Mod: LEFT SIDE | Performed by: RADIOLOGY

## 2024-09-09 PROCEDURE — 3008F BODY MASS INDEX DOCD: CPT | Performed by: INTERNAL MEDICINE

## 2024-09-09 RX ORDER — ESCITALOPRAM OXALATE 5 MG/1
2.5 TABLET ORAL DAILY
Qty: 45 TABLET | Refills: 1 | Status: CANCELLED | OUTPATIENT
Start: 2024-09-09

## 2024-09-09 RX ORDER — ERYTHROMYCIN 5 MG/G
OINTMENT OPHTHALMIC
COMMUNITY
Start: 2024-09-03

## 2024-09-09 RX ORDER — AMOXICILLIN AND CLAVULANATE POTASSIUM 875; 125 MG/1; MG/1
875 TABLET, FILM COATED ORAL 2 TIMES DAILY
Qty: 14 TABLET | Refills: 0 | Status: SHIPPED | OUTPATIENT
Start: 2024-09-09 | End: 2024-09-16

## 2024-09-09 RX ORDER — ROSUVASTATIN CALCIUM 40 MG/1
40 TABLET, COATED ORAL DAILY
Qty: 90 TABLET | Refills: 3 | Status: SHIPPED | OUTPATIENT
Start: 2024-09-09

## 2024-09-09 RX ORDER — LEVOTHYROXINE SODIUM 75 UG/1
75 TABLET ORAL
Qty: 90 TABLET | Refills: 3 | Status: SHIPPED | OUTPATIENT
Start: 2024-09-09

## 2024-09-09 ASSESSMENT — PAIN SCALES - GENERAL: PAINLEVEL: 0-NO PAIN

## 2024-09-09 ASSESSMENT — PATIENT HEALTH QUESTIONNAIRE - PHQ9
SUM OF ALL RESPONSES TO PHQ9 QUESTIONS 1 AND 2: 0
1. LITTLE INTEREST OR PLEASURE IN DOING THINGS: NOT AT ALL
2. FEELING DOWN, DEPRESSED OR HOPELESS: NOT AT ALL

## 2024-09-09 NOTE — PATIENT INSTRUCTIONS
Call Dr lr's office and tell them I said you need seen soon for sty and swollen lower eye lid with a pustule.  Continue warm compresses 10 minutes multiple times per day and the eye med from urgent care.  Start Augmentin 875 mg one tab every 12 hours with food x 7 days.    Recommend staying on the same dose of escitalopram / lexapro that you have been taking: please check at home and call us: are you taking 1/2 tab of 10 mg or 1/2 tab of 5 mg.      Cholesterol : LDL cholesterol goal is 60. Yours was better but was still too high at 99.  Recommend: Increase rosuvastatin to 40 mg once per day. New Rx sent to the pharmacy for 40 mg tabs.  Dr Cota also prefers this plan.    Levothyroxine: we will keep the same dose at wake up. But move the magnesium to another time. Nothing to be taken with levothyroxine and then wait an hour to eat or take any other meds.    Toe: xray today. Make an appt with Dr Bruce,  can help schedule.    Fasting blood test for cholesterol and thyroid in 3 months just before the dec 16 appt.    Mediterranean diet is recommended.  Exercise 30 minutes per day or more, include weight bearing exercise and flexibility/strengthening.  Keep regular sleep habits, 7 to 8 hours per night.  Hydrate 64 oz water per day.

## 2024-09-10 ENCOUNTER — OFFICE VISIT (OUTPATIENT)
Dept: PODIATRY | Facility: CLINIC | Age: 64
End: 2024-09-10
Payer: COMMERCIAL

## 2024-09-10 VITALS — HEART RATE: 82 BPM | DIASTOLIC BLOOD PRESSURE: 78 MMHG | TEMPERATURE: 97.9 F | SYSTOLIC BLOOD PRESSURE: 129 MMHG

## 2024-09-10 DIAGNOSIS — S90.122A CONTUSION OF SECOND TOE OF LEFT FOOT, INITIAL ENCOUNTER: Primary | ICD-10-CM

## 2024-09-10 DIAGNOSIS — S99.922A TOE INJURY, LEFT, INITIAL ENCOUNTER: ICD-10-CM

## 2024-09-10 DIAGNOSIS — M79.675 PAIN AND SWELLING OF TOE, LEFT: ICD-10-CM

## 2024-09-10 DIAGNOSIS — M79.675 TOE PAIN, LEFT: ICD-10-CM

## 2024-09-10 DIAGNOSIS — M79.89 PAIN AND SWELLING OF TOE, LEFT: ICD-10-CM

## 2024-09-10 PROCEDURE — 1036F TOBACCO NON-USER: CPT | Performed by: PODIATRIST

## 2024-09-10 PROCEDURE — 99202 OFFICE O/P NEW SF 15 MIN: CPT | Performed by: PODIATRIST

## 2024-09-10 PROCEDURE — 3078F DIAST BP <80 MM HG: CPT | Performed by: PODIATRIST

## 2024-09-10 PROCEDURE — 3074F SYST BP LT 130 MM HG: CPT | Performed by: PODIATRIST

## 2024-09-10 NOTE — PROGRESS NOTES
CC: left 2nd toe pain    HPI:  Patient seen as new pt with injury left 2nd toe several months ago, stoved toe into sliding glass door, reinjured the toe yesterday, it is swollen, full time osorio.    PCP: Dr. Nguyễn  Last visit: 9-9-24     PMH  Past Medical History:   Diagnosis Date    Abnormal findings on diagnostic imaging of liver and biliary tract 05/08/2014    Abnormal ultrasound of liver    Abnormal levels of other serum enzymes 07/07/2020    Alkaline phosphatase elevation    Abnormal levels of other serum enzymes 02/14/2019    Elevated alkaline phosphatase level    Acute maxillary sinusitis, unspecified 01/20/2016    Acute maxillary sinusitis    Acute serous otitis media, unspecified ear 10/17/2014    Acute serous otitis media    Acute sinusitis, unspecified 08/20/2012    Acute inflammation of sinus    Acute stress reaction 12/28/2020    Stress reaction    Acute upper respiratory infection, unspecified 10/13/2015    Acute URI    Alcohol dependence, in remission (Multi) 02/05/2020    History of alcohol dependence    Alcohol dependence, in remission (Multi) 02/05/2020    History of alcohol dependence    Alcohol dependence, uncomplicated (Multi) 12/10/2018    Alcohol dependence, episodic    Anxiety disorder, unspecified 09/03/2019    Acute anxiety    Benign neoplasm of colon, unspecified 08/07/2016    Tubular adenoma of colon    Cough, unspecified 10/13/2015    Cough    Dysuria 08/20/2012    Dysuria    Elevated blood-pressure reading, without diagnosis of hypertension 03/30/2019    Borderline hypertension    Elevation of levels of liver transaminase levels 07/07/2020    Elevated transaminase level    Encounter for antibody response examination 08/10/2021    Immunity status testing    Encounter for immunization 08/14/2021    Need for prophylactic vaccination against hepatitis A and hepatitis B    Encounter for screening for other viral diseases 08/10/2021    Need for hepatitis C screening test    Epistaxis  03/20/2019    Epistaxis, recurrent    Esophageal dysphagia 06/19/2024    Esophageal obstruction 08/02/2016    Esophageal stricture    Flatulence 01/20/2016    Flatulence symptom    Generalized contraction of visual field, unspecified eye 12/10/2018    Tunnel vision, unspecified laterality    Headache, unspecified 12/10/2018    Headache, temporal    Hemoptysis 06/02/2022    Cough with hemoptysis    Hemorrhage of anus and rectum 12/15/2014    Rectal bleeding    Iliotibial band syndrome, right leg 10/15/2020    Iliotibial band syndrome of both sides    Migraine, unspecified, not intractable, without status migrainosus 04/23/2016    Migraine headache    Olecranon bursitis, right elbow 02/21/2017    Olecranon bursitis of right elbow    Otalgia, right ear 10/17/2014    Otalgia of right ear    Other bursitis, not elsewhere classified, unspecified site 11/08/2016    Traumatic bursitis    Other chest pain 11/17/2015    Chest heaviness    Other conditions influencing health status 06/24/2013    (Lower) Leg Localized Swelling Unilateral    Other conditions influencing health status 08/25/2013    Rib Periostitis    Other long term (current) drug therapy 05/04/2021    Medication dose changed    Other long term (current) drug therapy 02/01/2021    Medication course changed    Other long term (current) drug therapy 07/07/2020    Medication dose changed    Other long term (current) drug therapy 08/14/2021    New medication added    Other specified abnormal findings of blood chemistry     Abnormal liver function test    Other specified disorders of muscle 10/15/2020    Hamstring tightness    Other specified disorders of nose and nasal sinuses 01/14/2021    Nasal crusting    Other specified disorders of nose and nasal sinuses 09/24/2015    Sinus pressure    Other specified disorders of nose and nasal sinuses 01/10/2019    Nasal dryness    Other specified problems related to primary support group 07/07/2020    Stress due to family  tension    Otitis media, unspecified, left ear 01/20/2016    Otitis media, left    Pain in right foot 06/29/2017    Foot arch pain, right    Pain in unspecified wrist 05/11/2012    Pain, wrist joint    Personal history of colonic polyps     History of colonic polyps    Personal history of COVID-19 01/27/2021    History of severe acute respiratory syndrome coronavirus 2 (SARS-CoV-2) disease    Personal history of diseases of the blood and blood-forming organs and certain disorders involving the immune mechanism 12/06/2013    History of leukocytosis    Personal history of diseases of the blood and blood-forming organs and certain disorders involving the immune mechanism 08/14/2021    History of anemia    Personal history of diseases of the blood and blood-forming organs and certain disorders involving the immune mechanism 07/07/2020    History of anemia    Personal history of diseases of the blood and blood-forming organs and certain disorders involving the immune mechanism 04/16/2020    History of iron deficiency anemia    Personal history of other (healed) physical injury and trauma     History of head injury    Personal history of other diseases of male genital organs 02/01/2021    History of epididymitis    Personal history of other diseases of male genital organs     History of prostatitis    Personal history of other diseases of the circulatory system 07/07/2020    History of hypotension    Personal history of other diseases of the circulatory system 09/04/2018    History of malignant hypertension    Personal history of other diseases of the digestive system     History of hemorrhoids    Personal history of other diseases of the digestive system     History of hiatal hernia    Personal history of other diseases of the digestive system     History of appendicitis    Personal history of other diseases of the nervous system and sense organs 10/12/2017    History of obstructive sleep apnea    Personal history of  other diseases of the nervous system and sense organs 10/22/2017    History of acute otitis media    Personal history of other diseases of the respiratory system 12/21/2018    History of acute sinusitis    Personal history of other diseases of the respiratory system 03/07/2018    History of acute sinusitis    Personal history of other diseases of the respiratory system 06/30/2015    History of acute sinusitis    Personal history of other diseases of the respiratory system 03/01/2022    History of acute bronchitis    Personal history of other diseases of the respiratory system 12/10/2018    History of paranasal sinus congestion    Personal history of other diseases of the respiratory system 10/13/2015    History of acute bronchitis    Personal history of other diseases of the respiratory system 11/08/2016    History of acute sinusitis    Personal history of other diseases of the respiratory system     History of pleurisy    Personal history of other diseases of the respiratory system 01/30/2017    History of acute sinusitis    Personal history of other mental and behavioral disorders 12/30/2018    History of nightmares    Personal history of other specified conditions 02/01/2021    History of epistaxis    Personal history of other specified conditions 11/30/2022    History of wheezing    Personal history of other specified conditions 08/20/2012    History of fever    Personal history of other specified conditions 02/01/2021    History of dysphagia    Personal history of other specified conditions 02/01/2021    History of dysphagia    Personal history of pneumonia (recurrent)     History of pneumonia    Personal history of urinary (tract) infections     History of urinary tract infection    Radiculopathy, cervical region     Cervical radiculopathy    Retention of urine, unspecified 08/20/2012    Urinary retention    Right upper quadrant abdominal tenderness 05/11/2012    Abdominal tenderness, right upper quadrant     Right upper quadrant pain 05/04/2021    Colicky RUQ abdominal pain    Spermatocele of epididymis, unspecified     Spermatocele    Strain of muscle, fascia and tendon at neck level, initial encounter 06/30/2015    Cervical strain    Unspecified abdominal pain 05/04/2021    Intermittent abdominal pain    Unspecified abdominal pain 05/04/2021    Intermittent abdominal pain    Unspecified hemorrhoids 01/27/2020    Bleeding hemorrhoids    Unspecified hemorrhoids     Bleeding hemorrhoids    Unspecified internal derangement of right knee 11/08/2016    Knee locking, right    Unspecified nonsuppurative otitis media, left ear     Allergic otitis media of left ear, unspecified chronicity    Uvular swelling 02/16/2023     MEDS    Current Outpatient Medications:     albuterol 90 mcg/actuation inhaler, Inhale 1-2 puffs every 4-6 hours as needed, Disp: 18 g, Rfl: 11    amoxicillin-pot clavulanate (Augmentin) 875-125 mg tablet, Take 1 tablet (875 mg) by mouth 2 times a day for 7 days. Take with food., Disp: 14 tablet, Rfl: 0    azelastine (Astelin) 137 mcg (0.1 %) nasal spray, Administer 2 sprays into each nostril 2 times a day as needed for rhinitis., Disp: 30 mL, Rfl: 2    buPROPion XL (Wellbutrin XL) 300 mg 24 hr tablet, Take 1 tablet (300 mg) by mouth once daily in the morning. Take 1 tablet daily with food in AM, Disp: 90 tablet, Rfl: 1    cholecalciferol (Vitamin D-3) 50 mcg (2,000 unit) capsule, Take 1 capsule (50 mcg) by mouth once daily., Disp: , Rfl:     cyanocobalamin (Vitamin B-12) 1,000 mcg tablet, Take 1 tablet (1,000 mcg) by mouth once daily., Disp: , Rfl:     diclofenac sodium 1 % kit, Apply to right knee 2 grms twice daily, Disp: , Rfl:     DOCUSATE CALCIUM ORAL, Take by mouth as needed at bedtime., Disp: , Rfl:     erythromycin (Romycin) 5 mg/gram (0.5 %) ophthalmic ointment, 1/2 inch in affected eye 4 times a day, for 7 days., Disp: , Rfl:     escitalopram (Lexapro) 5 mg tablet, Take 0.5 tablets (2.5 mg) by mouth  once daily. (Patient taking differently: Take 1 tablet (5 mg) by mouth once daily.), Disp: 45 tablet, Rfl: 1    esomeprazole (NexIUM) 40 mg DR capsule, Take 1 capsule (40 mg) by mouth 2 times a day. Take 1/2 hour before breakfast or lunch,and take at bedtime . Must be  from thyroid med by at least  one hour., Disp: 180 capsule, Rfl: 1    ferrous sulfate 325 (65 Fe) MG tablet, Take by mouth. Take 1 tablet daily with food three days per week with supper. Do not take 3 hours of thyroid pill, Disp: , Rfl:     fluticasone (Flonase) 50 mcg/actuation nasal spray, Administer 2 sprays into each nostril once daily. Shake gently. Before first use, prime pump. After use, clean tip and replace cap., Disp: 48 g, Rfl: 3    fluticasone (Flovent Diskus) 50 mcg/actuation diskus inhaler, Inhale 1 puff 2 times a day. Rinse mouth with water after use to reduce aftertaste and incidence of candidiasis. Do not swallow., Disp: 3 each, Rfl: 3    glucosamine HCl 750 mg tablet, Take 2 tablets by mouth once daily., Disp: , Rfl:     hydrocortisone (Anusol-HC) 25 mg suppository, Insert 1 suppository (25 mg) into the rectum 2 times a day as needed for hemorrhoids. Insert 1 suppository rectally at bedtime for 7-12 days, Disp: 12 suppository, Rfl: 1    levothyroxine (Synthroid, Levoxyl) 75 mcg tablet, Take 1 tablet (75 mcg) by mouth once daily in the morning. Take before meals. Take by itself, on an empty stomach and nothing but water for one hour after., Disp: 90 tablet, Rfl: 3    LORazepam (Ativan) 0.5 mg tablet, Take 1 tablet (0.5 mg) by mouth. 1 tab daily if needed for stressful situations, Disp: , Rfl:     magnesium oxide (Mag-Ox) 250 mg magnesium tablet, Take 1 tablet (250 mg) by mouth 2 times a day. Avoid taking close to iron, Disp: 180 tablet, Rfl: 2    multivitamin tablet, Take 1 tablet by mouth once daily., Disp: , Rfl:     polyethylene glycol (Glycolax) 17 gram/dose powder, Take by mouth once daily. Mix 1 packet in 8 ounces of  liquid and drink once daily, Disp: , Rfl:     psyllium (Metamucil) powder, Take by mouth. Use as directed, Disp: , Rfl:     rosuvastatin (Crestor) 40 mg tablet, Take 1 tablet (40 mg) by mouth once daily., Disp: 90 tablet, Rfl: 3    tamsulosin (Flomax) 0.4 mg 24 hr capsule, Take 1 capsule (0.4 mg) by mouth 2 times a day., Disp: 180 capsule, Rfl: 1    valsartan (Diovan) 320 mg tablet, Take 1 tablet (320 mg) by mouth once daily., Disp: 90 tablet, Rfl: 3  Allergies  Allergies   Allergen Reactions    Codeine Unknown    Morphine Headache     Social History     Socioeconomic History    Marital status:    Tobacco Use    Smoking status: Never     Passive exposure: Never    Smokeless tobacco: Former     Types: Chew     Quit date: 1/1/1986   Vaping Use    Vaping status: Never Used   Substance and Sexual Activity    Alcohol use: Never    Drug use: Never    Sexual activity: Yes     Partners: Female     Social Determinants of Health     Financial Resource Strain: Low Risk  (6/19/2024)    Overall Financial Resource Strain (CARDIA)     Difficulty of Paying Living Expenses: Not hard at all   Food Insecurity: No Food Insecurity (6/19/2024)    Hunger Vital Sign     Worried About Running Out of Food in the Last Year: Never true     Ran Out of Food in the Last Year: Never true   Transportation Needs: No Transportation Needs (6/19/2024)    PRAPARE - Transportation     Lack of Transportation (Medical): No     Lack of Transportation (Non-Medical): No   Physical Activity: Sufficiently Active (6/19/2024)    Exercise Vital Sign     Days of Exercise per Week: 7 days     Minutes of Exercise per Session: 60 min   Stress: No Stress Concern Present (6/19/2024)    [employer] [place] [employer] - Occupational Stress Questionnaire     Feeling of Stress : Not at all   Social Connections: Socially Integrated (6/19/2024)    Social Connection and Isolation Panel [NHANES]     Frequency of Communication with Friends and Family: More than  three times a week     Frequency of Social Gatherings with Friends and Family: More than three times a week     Attends Taoism Services: More than 4 times per year     Active Member of Clubs or Organizations: Yes     Attends Club or Organization Meetings: More than 4 times per year     Marital Status:    Intimate Partner Violence: Not At Risk (2024)    Humiliation, Afraid, Rape, and Kick questionnaire     Fear of Current or Ex-Partner: No     Emotionally Abused: No     Physically Abused: No     Sexually Abused: No   Housing Stability: Low Risk  (2024)    Housing Stability Vital Sign     Unable to Pay for Housing in the Last Year: No     Number of Places Lived in the Last Year: 1     Unstable Housing in the Last Year: No     Family History   Problem Relation Name Age of Onset    Sleep apnea Father      Other (parkinson disease) Father      Multiple sclerosis Sister      Brain cancer Sister  64    Multiple sclerosis Brother           suicide    Alzheimer's disease Other       Past Surgical History:   Procedure Laterality Date    APPENDECTOMY  2012    Appendectomy    COLONOSCOPY  2012    Colonoscopy (Fiberoptic)    ESOPHAGOGASTRODUODENOSCOPY  2012    Diagnostic Esophagogastroduodenoscopy    EYE SURGERY  2015    Eye Surgery    KNEE ARTHROSCOPY W/ DEBRIDEMENT  2015    Arthroscopy Knee    NASAL SEPTUM SURGERY  2015    Nasal Septal Deviation Repair    OTHER SURGICAL HISTORY  2016    Esophageal Dilation    OTHER SURGICAL HISTORY  2012    Surgery Prostate Transurethral Destruction Prostate Tissue    VASECTOMY  2012    Surgery Vas Deferens Vasectomy       REVIEW OF SYSTEMS    + as noted above in HPI.       Physical examination:   On General Observation: Patient is a pleasant, cooperative, well developed 64 y.o.  adult male. The patient is alert and oriented to time, place and person. Patient has normal affect and mood.  /78   Pulse 82   Temp  36.6 °C (97.9 °F)     Vascular:  DP and PT pulses are 2/4 b/l.  Left 2nd toe edema noted. no varicosities b/l.  CFT  5 seconds to all digits bilateral.  Skin temperature is warm to warm from proximal to distal bilateral.      Muscular: Strength is 5/5 b/l.  Motor strength is normal and symmetric proximally, as well as distally, in all four extremities. Good mobility of all extremities.  Tenderness to left 2nd toe.     Neuro:  Proprioception present.   Sensation to vibration is  present. Protective sensation present  at all pedal sites via Leon Thao 5.07 monofilament bilateral.  Light touch present bilateral.     Derm:  No rashes, lesions, or ecchymosis.     Edema is present left 2nd toe dipj, slight contraction of the toe.    ASSESSMENT:    Contusion left 2nd toe  Pain left 2nd toe     PLAN:   Consult  A comprehensive history and physical examination were preformed. The patient was educated on clinical findings, diagnosis and treatment plans. Patient understands all that has been explained and all questions were answered to apparent satisfaction.   -Reviewed xrays with pt, possible cyst left distal phalanx, vs fracture, STS present, await official read, reviewed plan, will gem splint the toes, rexray the toe in 4 weeks, price, needs to wear shoe-full time osorio.      Adam Bruce DPM

## 2024-09-11 NOTE — RESULT ENCOUNTER NOTE
Please call the patient regarding his result. He did have a toe fracture-not new, with the swelling he should see podiatry as we discussed

## 2024-09-23 DIAGNOSIS — F33.0 MILD EPISODE OF RECURRENT MAJOR DEPRESSIVE DISORDER (CMS-HCC): ICD-10-CM

## 2024-09-24 RX ORDER — ESCITALOPRAM OXALATE 5 MG/1
5 TABLET ORAL DAILY
Qty: 90 TABLET | Refills: 1 | Status: SHIPPED | OUTPATIENT
Start: 2024-09-24

## 2024-09-24 RX ORDER — BUPROPION HYDROCHLORIDE 300 MG/1
300 TABLET ORAL EVERY MORNING
Qty: 90 TABLET | Refills: 1 | Status: SHIPPED | OUTPATIENT
Start: 2024-09-24

## 2024-10-18 DIAGNOSIS — N40.1 BENIGN PROSTATIC HYPERPLASIA WITH INCOMPLETE BLADDER EMPTYING: ICD-10-CM

## 2024-10-18 DIAGNOSIS — R39.14 BENIGN PROSTATIC HYPERPLASIA WITH INCOMPLETE BLADDER EMPTYING: ICD-10-CM

## 2024-10-18 NOTE — TELEPHONE ENCOUNTER
Refill    tamsulosin (Flomax) 0.4 mg 24 hr capsule  0.4 mg, 2 times daily           Summary: Take 1 capsule (0.4 mg) by mouth   2 times a day          DEVANTE Zimmerman      Please check the frequency of the dose, the wording does not make since and it gave a hard stop    Please advise

## 2024-10-22 RX ORDER — TAMSULOSIN HYDROCHLORIDE 0.4 MG/1
0.4 CAPSULE ORAL 2 TIMES DAILY
Qty: 180 CAPSULE | Refills: 1 | Status: SHIPPED | OUTPATIENT
Start: 2024-10-22

## 2024-12-04 DIAGNOSIS — J45.30 MILD PERSISTENT REACTIVE AIRWAY DISEASE WITHOUT COMPLICATION (HHS-HCC): ICD-10-CM

## 2024-12-04 RX ORDER — FLUTICASONE PROPIONATE 50 UG/1
1 POWDER, METERED RESPIRATORY (INHALATION)
Qty: 3 EACH | Refills: 3 | Status: SHIPPED | OUTPATIENT
Start: 2024-12-04 | End: 2025-12-04

## 2024-12-04 NOTE — TELEPHONE ENCOUNTER
Pt called stating his Flovent needs to be sent to [facility] in order for it to be covered by insurance.

## 2024-12-10 NOTE — PROGRESS NOTES
"Patient ID: Livan Wen is a 64 y.o. male who presents for Follow-up (Pt here for follow up and review, but reports \"I seem to have a hand shaking thing\" Pt reports that he has a family hx of MS and parkinsons and would like to ask about these things. )    I am the Internal Medicine physician providing ongoing chronic medical care for this patient, which is managed during and in between office visits.    LAST VISIT PLAN FROM: 09/09/2024:  Htn stable  He is aware of physical exertion limitations with his aneurysm.  Mood Is stable on current meds.  He called back and is taking 5mg lexapro daily, will continue.  See patient instructions in wrap up plan, orders and comments for treatment plan.  Patient Instructions:  Call Dr lr's office and tell them I said you need seen soon for sty and swollen lower eye lid with a pustule.  Continue warm compresses 10 minutes multiple times per day and the eye med from urgent care.  Start Augmentin 875 mg one tab every 12 hours with food x 7 days.  Recommend staying on the same dose of escitalopram / lexapro that you have been taking: please check at home and call us: are you taking 1/2 tab of 10 mg or 1/2 tab of 5 mg.  Cholesterol : LDL cholesterol goal is 60. Yours was better but was still too high at 99.  Recommend: Increase rosuvastatin to 40 mg once per day. New Rx sent to the pharmacy for 40 mg tabs.  Dr Cota also prefers this plan.  Levothyroxine: we will keep the same dose at wake up. But move the magnesium to another time. Nothing to be taken with levothyroxine and then wait an hour to eat or take any other meds.  Toe: xray today. Make an appt with Dr Bruce,  can help schedule.  Fasting blood test for cholesterol and thyroid in 3 months just before the dec 16 appt.  Mediterranean diet is recommended.  Exercise 30 minutes per day or more, include weight bearing exercise and flexibility/strengthening.  Keep regular sleep habits, 7 to 8 hours per " "night.  Hydrate 64 oz water per day.  END LAST VISIT PLAN  -------------------------------------------  HPI  Patient is a 64-year-old male who presents today for follow up. Htn hld asthma/reactive airways, erin, depression.gerd    Since last visit, patient underwent XR Toe, Left (see Objective) showing a remote avulsion fracture at the dorsal aspect of the 2nd distal phalangeal base.  I reviewed his results.    Tremor:  Patient reports hand shaking and would like to be evaluated.  He has a family history of MS and Parkinson's disease.  He states that it occurs in bilateral hands when he is doing fine motor skills but not at rest.  We discussed that this is called an intention tremor.  Order placed for referral to Neurology.    Left Middle Trigger Finger:  Patient reports that his left middle trigger finger is bothering him.  It used to \"just catch\" but now reports pain in the joint.  He states that acetaminophen does help.  He has undergone steroid injection in the past and saw Orthopedic Surgery, Dr. Tonja Magaña in the past.  Advised follow up with Dr. JOHANNY Magaña.    Aortic Aneurysm:  Patient has an aortic aneurysm, 4.2 cm, and follows with Dr. Cota, last seen 06/17/2024.  Patient was wondering what type of weight lifting he can do for exercise.  I reviewed Dr. Cota's notes and we discussed.  We discussed not lifting more than 30 pounds, as per Dr. Cota. Advised against bench pressing which would increase his thoracic pressure.  We discussed some exercises that he could do including treadmill and bicycling with no heavy lifting.       Hypertension:  Follow up on cardiovascular conditions:  Patient continues on valsartan 320 mg daily.  Cardiac:   Chest pain?Yes unchanged, but denies chest pain upon exertion.  Palpitations? No  Increased abebe?  No  Other:  Resp:   Shortness of breath?No  Wheezing No  Cough No  Other:  Extremities:   Leg or ankle swelling?No "   Claudication?No  Other:  Neuro:  DizzinessNo  SyncopeNo   Blurred visionNo  New headaches No  Other:  Medications:Taking them regularly.Yes  Side effects.No    Hyperlipidemia:  No myalgias, nausea, abdominal pain.  Meds: Taking regularly, no adverse effect.  Patient continues on Crestor 40 mg daily.  Labs: Last LDL was 99 mg/dL on 07/25/2024.  LDL goal: <100 mg/dL, unless patient has plaque, than goal would be less than 70 mg/dL.    Reactive Airway Disease:  No complaints.  Use of rescue inhaler: No  Medications : Taking them regularly , no side effects.  Patient continues on Flovent Diskus 1 inhalation X2 daily.  No SOB, cough, sputum, wheezing. No SOB on exertion.  Patient states he ran out of his medication and has not had it for 3 weeks.  He requires a refill today.  He states that the medication does improve his breathing.  We discussed trying Arnuity Ellipta.  Patient is in agreement.  Order placed.    BPH:  Stable.  Patient continues on Flomax 0.4 mg X2 daily.    GERD:  Stable.  Patient continues on Nexium 40 mg X2 daily.    MDD:  Stable.  Patient continues on Wellbutrin  mg daily, Lexapro 5 mg daily, and ativan 0.5 mg PRN anxiety.    We discussed recommended vaccines including Shingrix (after the holidays), RSV, Covid booster and influenza vaccine.  Patient received his influenza vaccine today in office.    We discussed the Shingrix vaccine. This is a vaccine to prevent or lessen the severity of shingles. It  is much more effective than the prior Zostavax vaccine. It is a two shot series, with the second shot given 2-6 months after the first.  It can make your arm sore and can transiently cause fever chills body aches. It should preferably not be administered within 2 weeks of any other viral vaccine. It should not be given during a time of illness.This can be obtained at your pharmacy, the [facility], or via a nurse appointment at our office: recommend checking with your  insurance for best coverage.     Orders placed for blood work as required, previously.  Patient did not obtain prior to this visit.  Will obtain tomorrow.    Order placed for prescription as required.    Follow up 04/14/2025.    Review of Systems  See ROS in HPI    Current Outpatient Medications   Medication Instructions    albuterol 90 mcg/actuation inhaler Inhale 1-2 puffs every 4-6 hours as needed    azelastine (Astelin) 137 mcg (0.1 %) nasal spray 2 sprays, Each Nostril, 2 times daily PRN    buPROPion XL (WELLBUTRIN XL) 300 mg, oral, Every morning, Take 1 tablet daily with food in AM    cholecalciferol (VITAMIN D-3) 50 mcg, Daily    cyanocobalamin (Vitamin B-12) 1,000 mcg tablet 1 tablet, Daily    diclofenac sodium 1 % kit Apply to right knee 2 grms twice daily    DOCUSATE CALCIUM ORAL Nightly PRN    escitalopram (LEXAPRO) 5 mg, oral, Daily    esomeprazole (NEXIUM) 40 mg, oral, 2 times daily, Take 1/2 hour before breakfast or lunch,and take at bedtime . Must be  from thyroid med by at least  one hour.    ferrous sulfate 325 (65 Fe) MG tablet Take by mouth. Take 1 tablet daily with food three days per week with supper. Do not take 3 hours of thyroid pill    fluticasone (Flonase) 50 mcg/actuation nasal spray 2 sprays, Each Nostril, Daily, Shake gently. Before first use, prime pump. After use, clean tip and replace cap.    fluticasone furoate (Arnuity Ellipta) 100 mcg/actuation inhaler 1 puff, inhalation, Daily, Rinse mouth with water after use to reduce aftertaste and incidence of candidiasis. Do not swallow.    glucosamine HCl 750 mg tablet 2 tablets, Daily    hydrocortisone (ANUSOL-HC) 25 mg, rectal, 2 times daily PRN, Insert 1 suppository rectally at bedtime for 7-12 days    levothyroxine (SYNTHROID, LEVOXYL) 75 mcg, oral, Daily before breakfast, Take by itself, on an empty stomach and nothing but water for one hour after.    LORazepam (ATIVAN) 0.5 mg    magnesium oxide (MAG-OX) 250 mg, oral, 2 times  daily, Avoid taking close to iron    multivitamin tablet 1 tablet, Daily    polyethylene glycol (Glycolax) 17 gram/dose powder Daily RT    psyllium (Metamucil) powder Take by mouth. Use as directed    respiratory syncytial virus, RSV, vaccine, adjuvanted, age 60y+ (Arexvy) 120 mcg/0.5 mL suspension for reconstitution 0.5 mL, intramuscular, Once    rosuvastatin (CRESTOR) 40 mg, oral, Daily    tamsulosin (FLOMAX) 0.4 mg, oral, 2 times daily    valsartan (DIOVAN) 320 mg, oral, Daily     Allergies   Allergen Reactions    Codeine Unknown    Morphine Headache     Objective    The following test results have been reviewed:  XR Toe, Left 09/09/2024:  FINDINGS:  Left toe, four views  Remote appearing fracture through the dorsal aspect of the 2nd DIP  joint. This likely arises from the dorsal aspect of the 2nd distal  phalangeal base. Soft tissue edema present. This flexion of the 2nd  DIP joint. No acute abnormality seen  IMPRESSION:  Remote avulsion fracture at the dorsal aspect of the 2nd distal  phalangeal base  MACRO:  None  Signed by: Will Macdonald 9/10/2024 10:03 PM  Dictation workstation:   [id number]    Latest Complete Lab Results:  CBC:   Lab Results   Component Value Date    WBC 6.8 12/06/2023    RBC 4.26 (L) 12/06/2023    HGB 13.9 12/06/2023    HCT 42.6 12/06/2023     12/06/2023    MCH 32.6 12/06/2023    MCHC 32.6 12/06/2023     12/06/2023     CMP:  Lab Results   Component Value Date    GLUCOSE 97 12/06/2023     12/06/2023    K 4.3 12/06/2023     12/06/2023    CO2 28 12/06/2023    ANIONGAP 15 12/06/2023    BUN 18 12/06/2023    CREATININE 1.06 12/06/2023    CALCIUM 9.6 12/06/2023    ALBUMIN 4.5 12/06/2023    ALKPHOS 97 12/06/2023    PROT 7.2 12/06/2023    AST 20 07/25/2024    BILITOT 0.7 12/06/2023    ALT 23 07/25/2024      Lipid:   Lab Results   Component Value Date    CHOL 184 07/25/2024    HDL 62.4 07/25/2024    CHHDL 2.9 07/25/2024    LDLF 131 (H) 03/01/2023    VLDL 23 07/25/2024  "   TRIG 115 07/25/2024     LDL Direct:  Lab Results   Component Value Date    LDLDIRECT 143 (H) 12/06/2023      TSH:   Lab Results   Component Value Date    TSH 4.31 (H) 07/25/2024      B12:  Lab Results   Component Value Date    XSHOPOLG57 637 12/06/2023     Vitamin D:  Lab Results   Component Value Date    VITD25 43 12/06/2023      HgA1c:   No results found for: \"HGBA1C\", \"[id number]\"  PSA:   Lab Results   Component Value Date    PSA 0.42 12/06/2023        Physical Exam  Vitals:      6/17/2024     4:39 PM 6/19/2024     9:13 AM 9/3/2024    10:30 AM 9/3/2024    11:39 AM 9/9/2024     3:16 PM 9/10/2024     1:58 PM 12/16/2024     8:28 AM   Vitals   Systolic 142 124 134 147 104 129 108   Diastolic 105 70 94 73 64 78 78   BP Location Left arm Left arm Left arm       Heart Rate  64 83 80 64 82 68   Temp    36.4 °C (97.5 °F)  36.6 °C (97.9 °F)    Resp    17 18  18   Height  1.829 m (6') 1.829 m (6')  1.854 m (6' 1\")  1.854 m (6' 1\")   Weight (lb)  224 224 238.98 236  240   BMI  30.38 kg/m2 30.38 kg/m2 32.41 kg/m2 31.14 kg/m2  31.66 kg/m2   BSA (m2)  2.28 m2 2.28 m2 2.34 m2 2.35 m2  2.37 m2     Patient looks well and is in no obvious distress.  HEENT:   Normocephalic, no facial asymmetry  Eyes: Sclera and conjunctiva are clear.  Neck: No adenopathy cervical (Ant/post/lat), no Supraclavicular nodes.   Thyroid normal.  Carotid pulses normal, no carotid bruits.  Lungs : RR normal. Clear to auscultation anterior, posterior and lateral. No rales, wheezes rhonchi or rubs. Good air exchange.  Heart: RRR. No Murmur, gallop, click or rub.  Abdomen: Bowel sounds normal, No bruits. No pulsatile mass. No hepatosplenomegaly, masses or tenderness. Soft, no guarding.  Vascular:  Posterior tibialis and dorsalis pedis pulses within normal limits bilaterally.   Extremities: No upper extremity edema. No lower extremity edema.   Musculoskeletal: No synovitis of joints seen. No new deformity.  Neuro: CN 2-12 intact. Alert, " appropriate.  Ambulates independently.  No gross motor deficit.   No tremors at rest. No cogwheeling rigidity. Normal facies.  Psych: normal mood and affect.  Skin: No rash, bruising petechiae or jaundice.    Livan was seen today for follow-up.  Diagnoses and all orders for this visit:  Benign essential hypertension (Primary)  Elevated LDL cholesterol level  Gastroesophageal reflux disease, unspecified whether esophagitis present  Benign prostatic hyperplasia with incomplete bladder emptying  Mild persistent reactive airway disease without complication ([facility])  -     fluticasone furoate (Arnuity Ellipta) 100 mcg/actuation inhaler; Inhale 1 puff once daily. Rinse mouth with water after use to reduce aftertaste and incidence of candidiasis. Do not swallow.  -     respiratory syncytial virus, RSV, vaccine, adjuvanted, age 60y+ (Arexvy) 120 mcg/0.5 mL suspension for reconstitution; Inject 0.5 mL into the muscle 1 time for 1 dose.  Mild episode of recurrent major depressive disorder ([facility])  Comments:  stable on lexapro and bupropion.  Need for influenza vaccination  -     Flu vaccine, trivalent, preservative free, age 6 months and greater (Fluarix/Fluzone/Flulaval)  Intention tremor  Comments:  both hands with fine motor use of hands a little worse over 3 years 12/2024  Orders:  -     Referral to Neurology; Future  Trigger finger, left middle finger  Comments:  referred back to dr murillo whom he has seen for this earlier this year  Immunization counseling  Mild persistent reactive airway disease without complication ([facility])  Comments:  continue flovent discuss and prn albuterol inhaler  Orders:  -     fluticasone furoate (Arnuity Ellipta) 100 mcg/actuation inhaler; Inhale 1 puff once daily. Rinse mouth with water after use to reduce aftertaste and incidence of candidiasis. Do not swallow.  -     respiratory syncytial virus, RSV, vaccine, adjuvanted, age 60y+ (Arexvy) 120 mcg/0.5 mL suspension for reconstitution;  Inject 0.5 mL into the muscle 1 time for 1 dose.   Ldl goal is 60-we increased his rosuvastatin to 40 mg in sept, is due for labs on this.    See patient instructions in wrap up plan, orders and comments for treatment plan.  Patient Instructions:  Please make an appointment with Dr Magaña to follow up on trigger finger left hand acting up and follow up on right hand as well.    Referral written for neurology-aware it may take a few months for an appointment, you need movement disorder-  Dr Jennifer Gaming, Dr David Owen, Dr Maureen Banks.    We went over exercises and the aorta.    Change flovent to arnuity ellipta 100mcg, one inhalation, once per day, rinse mouth after use.     Consider RSV vaccine due to asthma history. At the pharmacy.    Shingrix after the holidays, fist vaccine and then the 2nd one 2-6 months later.    Follow up 3-4 months. Annual is in June or July.    Fasting blood test and no vitamins for 12 hours prior: tomorrow.      Scribe Attestation  By signing my name below, Inessa HUNTER, Scrjoya   attest that this documentation has been prepared under the direction and in the presence of Sonia Nguyễn MD.

## 2024-12-16 ENCOUNTER — APPOINTMENT (OUTPATIENT)
Dept: PRIMARY CARE | Facility: CLINIC | Age: 64
End: 2024-12-16
Payer: COMMERCIAL

## 2024-12-16 VITALS
HEIGHT: 73 IN | WEIGHT: 240 LBS | RESPIRATION RATE: 18 BRPM | BODY MASS INDEX: 31.81 KG/M2 | SYSTOLIC BLOOD PRESSURE: 108 MMHG | DIASTOLIC BLOOD PRESSURE: 78 MMHG | HEART RATE: 68 BPM

## 2024-12-16 DIAGNOSIS — Z79.899 ENCOUNTER FOR MONITORING STATIN THERAPY: ICD-10-CM

## 2024-12-16 DIAGNOSIS — G25.2 INTENTION TREMOR: ICD-10-CM

## 2024-12-16 DIAGNOSIS — E78.00 ELEVATED LDL CHOLESTEROL LEVEL: ICD-10-CM

## 2024-12-16 DIAGNOSIS — J45.30 MILD PERSISTENT REACTIVE AIRWAY DISEASE WITHOUT COMPLICATION (HHS-HCC): ICD-10-CM

## 2024-12-16 DIAGNOSIS — I71.21 ANEURYSM OF ASCENDING AORTA WITHOUT RUPTURE (CMS-HCC): ICD-10-CM

## 2024-12-16 DIAGNOSIS — F33.0 MILD EPISODE OF RECURRENT MAJOR DEPRESSIVE DISORDER (CMS-HCC): ICD-10-CM

## 2024-12-16 DIAGNOSIS — I10 BENIGN ESSENTIAL HYPERTENSION: Primary | ICD-10-CM

## 2024-12-16 DIAGNOSIS — Z71.85 IMMUNIZATION COUNSELING: ICD-10-CM

## 2024-12-16 DIAGNOSIS — K21.9 GASTROESOPHAGEAL REFLUX DISEASE, UNSPECIFIED WHETHER ESOPHAGITIS PRESENT: ICD-10-CM

## 2024-12-16 DIAGNOSIS — Z23 NEED FOR INFLUENZA VACCINATION: ICD-10-CM

## 2024-12-16 DIAGNOSIS — Z51.81 ENCOUNTER FOR MONITORING STATIN THERAPY: ICD-10-CM

## 2024-12-16 DIAGNOSIS — M65.332 TRIGGER FINGER, LEFT MIDDLE FINGER: ICD-10-CM

## 2024-12-16 DIAGNOSIS — R39.14 BENIGN PROSTATIC HYPERPLASIA WITH INCOMPLETE BLADDER EMPTYING: ICD-10-CM

## 2024-12-16 DIAGNOSIS — N40.1 BENIGN PROSTATIC HYPERPLASIA WITH INCOMPLETE BLADDER EMPTYING: ICD-10-CM

## 2024-12-16 PROCEDURE — 99215 OFFICE O/P EST HI 40 MIN: CPT | Performed by: INTERNAL MEDICINE

## 2024-12-16 PROCEDURE — 90471 IMMUNIZATION ADMIN: CPT | Performed by: INTERNAL MEDICINE

## 2024-12-16 PROCEDURE — 3078F DIAST BP <80 MM HG: CPT | Performed by: INTERNAL MEDICINE

## 2024-12-16 PROCEDURE — 3008F BODY MASS INDEX DOCD: CPT | Performed by: INTERNAL MEDICINE

## 2024-12-16 PROCEDURE — 3074F SYST BP LT 130 MM HG: CPT | Performed by: INTERNAL MEDICINE

## 2024-12-16 PROCEDURE — 90656 IIV3 VACC NO PRSV 0.5 ML IM: CPT | Performed by: INTERNAL MEDICINE

## 2024-12-16 RX ORDER — FLUTICASONE FUROATE 100 UG/1
1 POWDER RESPIRATORY (INHALATION) DAILY
Qty: 1 EACH | Refills: 11 | Status: SHIPPED | OUTPATIENT
Start: 2024-12-16 | End: 2025-12-16

## 2024-12-16 ASSESSMENT — PAIN SCALES - GENERAL: PAINLEVEL_OUTOF10: 2

## 2024-12-16 NOTE — PATIENT INSTRUCTIONS
Please make an appointment with Dr Magaña to follow up on trigger finger left hand acting up and follow up on right hand as well.    Referral written for neurology-aware it may take a few months for an appointment, you need movement disorder-  Dr Jennifer Gaming, Dr David Owen, Dr Maureen Banks.    We went over exercises and the aorta.    Change flovent to arnuity ellipta 100mcg, one inhalation, once per day, rinse mouth after use.     Consider RSV vaccine due to asthma history. At the pharmacy.    Shingrix after the holidays, fist vaccine and then the 2nd one 2-6 months later.    Follow up 3-4 months. Annual is in June or July.    Fasting blood test and no vitamins for 12 hours prior: tomorrow.

## 2024-12-26 ENCOUNTER — LAB (OUTPATIENT)
Dept: LAB | Facility: LAB | Age: 64
End: 2024-12-26
Payer: COMMERCIAL

## 2024-12-26 DIAGNOSIS — E78.00 ELEVATED LDL CHOLESTEROL LEVEL: ICD-10-CM

## 2024-12-26 DIAGNOSIS — R79.89 TSH ELEVATION: ICD-10-CM

## 2024-12-26 DIAGNOSIS — E06.3 HYPOTHYROIDISM DUE TO HASHIMOTO'S THYROIDITIS: ICD-10-CM

## 2024-12-26 PROCEDURE — 82550 ASSAY OF CK (CPK): CPT

## 2024-12-26 PROCEDURE — 80061 LIPID PANEL: CPT

## 2024-12-26 PROCEDURE — 84450 TRANSFERASE (AST) (SGOT): CPT

## 2024-12-26 PROCEDURE — 83721 ASSAY OF BLOOD LIPOPROTEIN: CPT

## 2024-12-26 PROCEDURE — 84460 ALANINE AMINO (ALT) (SGPT): CPT

## 2024-12-26 PROCEDURE — 84443 ASSAY THYROID STIM HORMONE: CPT

## 2024-12-27 LAB
ALT SERPL W P-5'-P-CCNC: 22 U/L (ref 10–52)
AST SERPL W P-5'-P-CCNC: 21 U/L (ref 9–39)
CHOLEST SERPL-MCNC: 225 MG/DL (ref 0–199)
CHOLESTEROL/HDL RATIO: 3.4
CK SERPL-CCNC: 137 U/L (ref 0–325)
HDLC SERPL-MCNC: 65.4 MG/DL
LDLC SERPL CALC-MCNC: 115 MG/DL
LDLC SERPL DIRECT ASSAY-MCNC: 137 MG/DL (ref 0–129)
NON HDL CHOLESTEROL: 160 MG/DL (ref 0–149)
TRIGL SERPL-MCNC: 224 MG/DL (ref 0–149)
TSH SERPL-ACNC: 3 MIU/L (ref 0.44–3.98)
VLDL: 45 MG/DL (ref 0–40)

## 2025-01-08 ENCOUNTER — APPOINTMENT (OUTPATIENT)
Dept: PRIMARY CARE | Facility: CLINIC | Age: 65
End: 2025-01-08
Payer: COMMERCIAL

## 2025-01-08 DIAGNOSIS — Z23 NEED FOR SHINGLES VACCINE: ICD-10-CM

## 2025-01-08 PROCEDURE — 90750 HZV VACC RECOMBINANT IM: CPT | Performed by: INTERNAL MEDICINE

## 2025-01-08 PROCEDURE — 90471 IMMUNIZATION ADMIN: CPT | Performed by: INTERNAL MEDICINE

## 2025-01-08 NOTE — PROGRESS NOTES
Livan is here for a nurse visit to have his first shingles vaccine.    Livan received  his shingles vaccine in his left deltoid, tolerated well.

## 2025-03-11 ENCOUNTER — APPOINTMENT (OUTPATIENT)
Dept: NEUROLOGY | Facility: CLINIC | Age: 65
End: 2025-03-11
Payer: COMMERCIAL

## 2025-03-11 VITALS
TEMPERATURE: 97.5 F | BODY MASS INDEX: 33.27 KG/M2 | RESPIRATION RATE: 12 BRPM | SYSTOLIC BLOOD PRESSURE: 128 MMHG | HEIGHT: 73 IN | HEART RATE: 75 BPM | WEIGHT: 251 LBS | DIASTOLIC BLOOD PRESSURE: 90 MMHG

## 2025-03-11 DIAGNOSIS — G25.2 INTENTION TREMOR: ICD-10-CM

## 2025-03-11 PROCEDURE — 1159F MED LIST DOCD IN RCRD: CPT | Performed by: PSYCHIATRY & NEUROLOGY

## 2025-03-11 PROCEDURE — 1160F RVW MEDS BY RX/DR IN RCRD: CPT | Performed by: PSYCHIATRY & NEUROLOGY

## 2025-03-11 PROCEDURE — 99214 OFFICE O/P EST MOD 30 MIN: CPT | Performed by: PSYCHIATRY & NEUROLOGY

## 2025-03-11 PROCEDURE — 3080F DIAST BP >= 90 MM HG: CPT | Performed by: PSYCHIATRY & NEUROLOGY

## 2025-03-11 PROCEDURE — 3074F SYST BP LT 130 MM HG: CPT | Performed by: PSYCHIATRY & NEUROLOGY

## 2025-03-11 PROCEDURE — G8433 SCR FOR DEP NOT CPT DOC RSN: HCPCS | Performed by: PSYCHIATRY & NEUROLOGY

## 2025-03-11 PROCEDURE — 1126F AMNT PAIN NOTED NONE PRSNT: CPT | Performed by: PSYCHIATRY & NEUROLOGY

## 2025-03-11 PROCEDURE — 3008F BODY MASS INDEX DOCD: CPT | Performed by: PSYCHIATRY & NEUROLOGY

## 2025-03-11 PROCEDURE — 1036F TOBACCO NON-USER: CPT | Performed by: PSYCHIATRY & NEUROLOGY

## 2025-03-11 ASSESSMENT — ENCOUNTER SYMPTOMS
OCCASIONAL FEELINGS OF UNSTEADINESS: 0
DEPRESSION: 0
LOSS OF SENSATION IN FEET: 0

## 2025-03-11 ASSESSMENT — PAIN SCALES - GENERAL: PAINLEVEL_OUTOF10: 0-NO PAIN

## 2025-03-11 NOTE — PATIENT INSTRUCTIONS
"It was a pleasure seeing you today.     Wellbutrin may be making tremors worse. 1 month off that medicine would be enough time to know if it is the medication causing these tremors.    Please make a follow up appointment in 4-6 months.  You may also schedule a phone or virtual visit sooner on a Friday morning with me as needed before the next clinic appointment.     For any urgent issues or needing to speak to a medical assistant please call [phone], option 6 during our office hours Monday-Friday 8am-4pm, and leave a voicemail with your concern.  My office will try to reach back you as soon as possible within 24 (business) hours.  If you have an emergency please call 911 or visit a local urgent care or nearest emergency room.      Please understand that studentSN is a useful communication tool for simple \"normal\" results or a refill request but I would not recommend using this tool for emergent or urgent issues or for conversations with me.  I am happy to ask my staff to rearrange a follow up visit or a virtual visit sooner than requested if appropriate for your care.    "

## 2025-03-11 NOTE — PROGRESS NOTES
Consultation:   Subjective      Livan MARELY Wen is a 65 y.o. year old RH male is here for consult for tremor.     Referred by Sonia Nguyễn MD      HPI  Tremors began in 2020 in b/l hands.  He would notice the tremor during mostly fine motor tasks.   There are times that the tremor will come out when he is using a fork or spoon , writing/ drawing may be affected.  Not every day.    Caffeine use: 3 cups coffee daily  Alcohol use: 12 vodka/seltzer per week  Family history: 6 siblings.  Father had PD/tremors at age 68 and 2 siblings have MS.      He retired as  and now is working a farmer full time.  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  He was making a onesie, was trying to draw a sketch on a fabric and his tremor was acting out.  He notices when putting on a screw/ notch his tremor will act up.     On Wellbutrin for 10 years.   Had history of migraines- was on Topamax and became depressed after Topamax.  Had migraine surgery at age 50 and has not had migraine since then.     Review of Systems    Patient Active Problem List   Diagnosis    Abnormal chest xray    Acquired deformity of chest and rib    Allergic rhinitis    Aneurysm of ascending aorta (CMS-HCC)    Asbestos exposure    Atypical chest pain    Benign essential hypertension    Benign prostatic hyperplasia with incomplete bladder emptying    Chronic constipation    Chronic pansinusitis    ADAMARIS (obstructive sleep apnea)    Coronary artery calcification seen on CAT scan    Hypercholesterolemia    Esophageal reflux    Fatigue    Fatty liver    Gallbladder polyp    Generalized anxiety disorder    Homozygous MTHFR mutation [id number]    Hypothyroidism    Incidental lung nodule, > 3mm and < 8mm    Locking of left knee    Mass of joint of right knee    Medication side effect    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)    Nasal dryness    Nasal septal deviation    Overweight    PAC (premature atrial contraction)    Palpitations    Reactive  airway disease (HHS-HCC)    Right knee pain    Vitamin B12 deficiency    Vitamin D deficiency    SOBOE (shortness of breath on exertion)    Colicky RUQ abdominal pain    Bleeding hemorrhoids    Hernia, abdominal    History of severe acute respiratory syndrome coronavirus 2 (SARS-CoV-2) disease    History of migraine headaches    Tubular adenoma of colon    Dizziness    Elevated LDL cholesterol level    Mild persistent asthma without complication (HHS-HCC)    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)    Gastroesophageal reflux disease    Steatosis of liver     Past Medical History:   Diagnosis Date    Abnormal findings on diagnostic imaging of liver and biliary tract 05/08/2014    Abnormal ultrasound of liver    Abnormal levels of other serum enzymes 07/07/2020    Alkaline phosphatase elevation    Abnormal levels of other serum enzymes 02/14/2019    Elevated alkaline phosphatase level    Acute maxillary sinusitis, unspecified 01/20/2016    Acute maxillary sinusitis    Acute serous otitis media, unspecified ear 10/17/2014    Acute serous otitis media    Acute sinusitis, unspecified 08/20/2012    Acute inflammation of sinus    Acute stress reaction 12/28/2020    Stress reaction    Acute upper respiratory infection, unspecified 10/13/2015    Acute URI    Alcohol dependence, in remission 02/05/2020    History of alcohol dependence    Alcohol dependence, in remission 02/05/2020    History of alcohol dependence    Alcohol dependence, uncomplicated (Multi) 12/10/2018    Alcohol dependence, episodic    Anxiety disorder, unspecified 09/03/2019    Acute anxiety    Benign neoplasm of colon, unspecified 08/07/2016    Tubular adenoma of colon    Cough, unspecified 10/13/2015    Cough    Dysuria 08/20/2012    Dysuria    Elevated blood-pressure reading, without diagnosis of hypertension 03/30/2019    Borderline hypertension    Elevation of levels of liver transaminase levels 07/07/2020    Elevated transaminase level     Encounter for antibody response examination 08/10/2021    Immunity status testing    Encounter for immunization 08/14/2021    Need for prophylactic vaccination against hepatitis A and hepatitis B    Encounter for screening for other viral diseases 08/10/2021    Need for hepatitis C screening test    Epistaxis 03/20/2019    Epistaxis, recurrent    Esophageal dysphagia 06/19/2024    Esophageal obstruction 08/02/2016    Esophageal stricture    Flatulence 01/20/2016    Flatulence symptom    Generalized contraction of visual field, unspecified eye 12/10/2018    Tunnel vision, unspecified laterality    Headache, unspecified 12/10/2018    Headache, temporal    Hemoptysis 06/02/2022    Cough with hemoptysis    Hemorrhage of anus and rectum 12/15/2014    Rectal bleeding    Iliotibial band syndrome, right leg 10/15/2020    Iliotibial band syndrome of both sides    Migraine, unspecified, not intractable, without status migrainosus 04/23/2016    Migraine headache    Olecranon bursitis, right elbow 02/21/2017    Olecranon bursitis of right elbow    Otalgia, right ear 10/17/2014    Otalgia of right ear    Other bursitis, not elsewhere classified, unspecified site 11/08/2016    Traumatic bursitis    Other chest pain 11/17/2015    Chest heaviness    Other conditions influencing health status 06/24/2013    (Lower) Leg Localized Swelling Unilateral    Other conditions influencing health status 08/25/2013    Rib Periostitis    Other long term (current) drug therapy 05/04/2021    Medication dose changed    Other long term (current) drug therapy 02/01/2021    Medication course changed    Other long term (current) drug therapy 07/07/2020    Medication dose changed    Other long term (current) drug therapy 08/14/2021    New medication added    Other specified abnormal findings of blood chemistry     Abnormal liver function test    Other specified disorders of muscle 10/15/2020    Hamstring tightness    Other specified disorders of nose  and nasal sinuses 01/14/2021    Nasal crusting    Other specified disorders of nose and nasal sinuses 09/24/2015    Sinus pressure    Other specified disorders of nose and nasal sinuses 01/10/2019    Nasal dryness    Other specified problems related to primary support group 07/07/2020    Stress due to family tension    Otitis media, unspecified, left ear 01/20/2016    Otitis media, left    Pain in right foot 06/29/2017    Foot arch pain, right    Pain in unspecified wrist 05/11/2012    Pain, wrist joint    Personal history of colonic polyps     History of colonic polyps    Personal history of COVID-19 01/27/2021    History of severe acute respiratory syndrome coronavirus 2 (SARS-CoV-2) disease    Personal history of diseases of the blood and blood-forming organs and certain disorders involving the immune mechanism 12/06/2013    History of leukocytosis    Personal history of diseases of the blood and blood-forming organs and certain disorders involving the immune mechanism 08/14/2021    History of anemia    Personal history of diseases of the blood and blood-forming organs and certain disorders involving the immune mechanism 07/07/2020    History of anemia    Personal history of diseases of the blood and blood-forming organs and certain disorders involving the immune mechanism 04/16/2020    History of iron deficiency anemia    Personal history of other (healed) physical injury and trauma     History of head injury    Personal history of other diseases of male genital organs 02/01/2021    History of epididymitis    Personal history of other diseases of male genital organs     History of prostatitis    Personal history of other diseases of the circulatory system 07/07/2020    History of hypotension    Personal history of other diseases of the circulatory system 09/04/2018    History of malignant hypertension    Personal history of other diseases of the digestive system     History of hemorrhoids    Personal history of  other diseases of the digestive system     History of hiatal hernia    Personal history of other diseases of the digestive system     History of appendicitis    Personal history of other diseases of the nervous system and sense organs 10/12/2017    History of obstructive sleep apnea    Personal history of other diseases of the nervous system and sense organs 10/22/2017    History of acute otitis media    Personal history of other diseases of the respiratory system 12/21/2018    History of acute sinusitis    Personal history of other diseases of the respiratory system 03/07/2018    History of acute sinusitis    Personal history of other diseases of the respiratory system 06/30/2015    History of acute sinusitis    Personal history of other diseases of the respiratory system 03/01/2022    History of acute bronchitis    Personal history of other diseases of the respiratory system 12/10/2018    History of paranasal sinus congestion    Personal history of other diseases of the respiratory system 10/13/2015    History of acute bronchitis    Personal history of other diseases of the respiratory system 11/08/2016    History of acute sinusitis    Personal history of other diseases of the respiratory system     History of pleurisy    Personal history of other diseases of the respiratory system 01/30/2017    History of acute sinusitis    Personal history of other mental and behavioral disorders 12/30/2018    History of nightmares    Personal history of other specified conditions 02/01/2021    History of epistaxis    Personal history of other specified conditions 11/30/2022    History of wheezing    Personal history of other specified conditions 08/20/2012    History of fever    Personal history of other specified conditions 02/01/2021    History of dysphagia    Personal history of other specified conditions 02/01/2021    History of dysphagia    Personal history of pneumonia (recurrent)     History of pneumonia    Personal  history of urinary (tract) infections     History of urinary tract infection    Radiculopathy, cervical region     Cervical radiculopathy    Retention of urine, unspecified 08/20/2012    Urinary retention    Right upper quadrant abdominal tenderness 05/11/2012    Abdominal tenderness, right upper quadrant    Right upper quadrant pain 05/04/2021    Colicky RUQ abdominal pain    Spermatocele of epididymis, unspecified     Spermatocele    Strain of muscle, fascia and tendon at neck level, initial encounter 06/30/2015    Cervical strain    Unspecified abdominal pain 05/04/2021    Intermittent abdominal pain    Unspecified abdominal pain 05/04/2021    Intermittent abdominal pain    Unspecified hemorrhoids 01/27/2020    Bleeding hemorrhoids    Unspecified hemorrhoids     Bleeding hemorrhoids    Unspecified internal derangement of right knee 11/08/2016    Knee locking, right    Unspecified nonsuppurative otitis media, left ear     Allergic otitis media of left ear, unspecified chronicity    Uvular swelling 02/16/2023     Past Surgical History:   Procedure Laterality Date    APPENDECTOMY  08/20/2012    Appendectomy    COLONOSCOPY  04/21/2012    Colonoscopy (Fiberoptic)    ESOPHAGOGASTRODUODENOSCOPY  04/21/2012    Diagnostic Esophagogastroduodenoscopy    EYE SURGERY  07/16/2015    Eye Surgery    KNEE ARTHROSCOPY W/ DEBRIDEMENT  07/16/2015    Arthroscopy Knee    NASAL SEPTUM SURGERY  07/16/2015    Nasal Septal Deviation Repair    OTHER SURGICAL HISTORY  08/02/2016    Esophageal Dilation    OTHER SURGICAL HISTORY  11/18/2012    Surgery Prostate Transurethral Destruction Prostate Tissue    VASECTOMY  04/21/2012    Surgery Vas Deferens Vasectomy     Social History     Tobacco Use    Smoking status: Never     Passive exposure: Never    Smokeless tobacco: Former     Types: Chew     Quit date: 1/1/1986   Substance Use Topics    Alcohol use: Yes     Alcohol/week: 12.0 standard drinks of alcohol     Types: 12 Cans of beer per week      family history includes Alzheimer's disease in his paternal grandfather and paternal grandmother; Brain cancer (age of onset: 64) in his sister; Multiple sclerosis in his brother and sister; Sleep apnea in his father; parkinson disease in his father.    Current Outpatient Medications:     albuterol 90 mcg/actuation inhaler, Inhale 1-2 puffs every 4-6 hours as needed, Disp: 18 g, Rfl: 11    azelastine (Astelin) 137 mcg (0.1 %) nasal spray, Administer 2 sprays into each nostril 2 times a day as needed for rhinitis., Disp: 30 mL, Rfl: 2    buPROPion XL (Wellbutrin XL) 300 mg 24 hr tablet, Take 1 tablet (300 mg) by mouth once daily in the morning. Take 1 tablet daily with food in AM, Disp: 90 tablet, Rfl: 1    cholecalciferol (Vitamin D-3) 50 mcg (2,000 unit) capsule, Take 1 capsule (50 mcg) by mouth once daily., Disp: , Rfl:     cyanocobalamin (Vitamin B-12) 1,000 mcg tablet, Take 1 tablet (1,000 mcg) by mouth once daily., Disp: , Rfl:     diclofenac sodium 1 % kit, Apply to right knee 2 grms twice daily, Disp: , Rfl:     escitalopram (Lexapro) 5 mg tablet, Take 1 tablet (5 mg) by mouth once daily., Disp: 90 tablet, Rfl: 1    esomeprazole (NexIUM) 40 mg DR capsule, Take 1 capsule (40 mg) by mouth 2 times a day. Take 1/2 hour before breakfast or lunch,and take at bedtime . Must be  from thyroid med by at least  one hour., Disp: 180 capsule, Rfl: 1    ferrous sulfate 325 (65 Fe) MG tablet, Take by mouth. Take 1 tablet daily with food three days per week with supper. Do not take 3 hours of thyroid pill, Disp: , Rfl:     fluticasone (Flonase) 50 mcg/actuation nasal spray, Administer 2 sprays into each nostril once daily. Shake gently. Before first use, prime pump. After use, clean tip and replace cap., Disp: 48 g, Rfl: 3    fluticasone furoate (Arnuity Ellipta) 100 mcg/actuation inhaler, Inhale 1 puff once daily. Rinse mouth with water after use to reduce aftertaste and incidence of candidiasis. Do not  "swallow., Disp: 1 each, Rfl: 11    glucosamine HCl 750 mg tablet, Take 2 tablets by mouth once daily., Disp: , Rfl:     levothyroxine (Synthroid, Levoxyl) 75 mcg tablet, Take 1 tablet (75 mcg) by mouth once daily in the morning. Take before meals. Take by itself, on an empty stomach and nothing but water for one hour after., Disp: 90 tablet, Rfl: 3    LORazepam (Ativan) 0.5 mg tablet, Take 1 tablet (0.5 mg) by mouth. 1 tab daily if needed for stressful situations, Disp: , Rfl:     magnesium oxide (Mag-Ox) 250 mg magnesium tablet, Take 1 tablet (250 mg) by mouth 2 times a day. Avoid taking close to iron, Disp: 180 tablet, Rfl: 2    multivitamin tablet, Take 1 tablet by mouth once daily., Disp: , Rfl:     polyethylene glycol (Glycolax) 17 gram/dose powder, Take by mouth once daily. Mix 1 packet in 8 ounces of liquid and drink once daily, Disp: , Rfl:     psyllium (Metamucil) powder, Take by mouth. Use as directed, Disp: , Rfl:     rosuvastatin (Crestor) 40 mg tablet, Take 1 tablet (40 mg) by mouth once daily., Disp: 90 tablet, Rfl: 3    tamsulosin (Flomax) 0.4 mg 24 hr capsule, Take 1 capsule (0.4 mg) by mouth 2 times a day., Disp: 180 capsule, Rfl: 1    valsartan (Diovan) 320 mg tablet, Take 1 tablet (320 mg) by mouth once daily., Disp: 90 tablet, Rfl: 3    DOCUSATE CALCIUM ORAL, Take by mouth as needed at bedtime. (Patient not taking: Reported on 3/11/2025), Disp: , Rfl:     hydrocortisone (Anusol-HC) 25 mg suppository, Insert 1 suppository (25 mg) into the rectum 2 times a day as needed for hemorrhoids. Insert 1 suppository rectally at bedtime for 7-12 days (Patient not taking: Reported on 3/11/2025), Disp: 12 suppository, Rfl: 1  Allergies   Allergen Reactions    Codeine Unknown     /90 (BP Location: Right arm, Patient Position: Sitting)   Pulse 75   Temp 36.4 °C (97.5 °F)   Resp 12   Ht 1.854 m (6' 1\")   Wt 114 kg (251 lb)   BMI 33.12 kg/m²   Neurological Exam/Physical Exam:    Constitutional: General " appearance: no acute distress. Pleasant.   Auscultation of Heart: Regular rate and rhythm, no murmurs, normal S1 and S2.   Carotid Arteries: no bruits  Peripheral Vascular Exam: No swelling in extremities  Mental status: no distress, alert, interactive and cooperative. Affect is appropriate.     Attention: normal attention  Language: normal comprehension   Fund of knowledge: Patient displays adequate knowledge of current events.  Eyes: The ophthalmoscopic examination was normal.   Cranial nerve II: Visual fields full to confrontation.   Cranial nerves III, IV, and VI: Pupils round, equally reactive to light; EOMs intact. No nystagmus.   Cranial Nerve V: Facial sensation intact to LT bilaterally.   Cranial nerve VII: no facial droop  Cranial nerve VIII: Hearing is intact  Cranial nerves IX and X: Palate elevates symmetrically.   Cranial nerve XI: Shoulder shrug intact.   Cranial nerve XII: Tongue is midline.  Motor:  Strength is normal.  He has stitches in the left hand at his wrist as well as his third MCP trigger finger that he had 2 weeks ago.  He has a mild postural tremor and a mild kinetic tremor.  Spirals are mildly affected.  Handwriting is mostly normal.  There is no bradykinesia or rigidity.  Appears to have arthritic joints in hands.  Deep Tendon Reflexes: left biceps 2+ , right biceps 2+, left triceps 2+, right triceps 2+, left brachioradialis 2+, right brachioradialis 2+, left patella 2+, right patella 2+, left ankle jerk 2+, right ankle jerk 2+   Plantar Reflex: Toes downgoing to plantar stimulation on the left. Toes downgoing to plantar stimulation on the right.   Sensory Exam: Normal to vibratory sensation  Coordination:  no limb dystaxia  Gait: Normal gait       Labs:  CBC:   Lab Results   Component Value Date    WBC 6.8 12/06/2023    HGB 13.9 12/06/2023    HCT 42.6 12/06/2023     12/06/2023     BMP:   Lab Results   Component Value Date     12/06/2023    K 4.3 12/06/2023      12/06/2023    CO2 28 12/06/2023    BUN 18 12/06/2023    CREATININE 1.06 12/06/2023    CALCIUM 9.6 12/06/2023    PHOS 3.4 11/15/2020     LFT:   Lab Results   Component Value Date    ALKPHOS 97 12/06/2023    BILITOT 0.7 12/06/2023    BILIDIR 0.1 04/13/2020    PROT 7.2 12/06/2023    ALBUMIN 4.5 12/06/2023    ALT 22 12/26/2024    AST 21 12/26/2024       Labs were viewed personally.   Kidney function normal.  CBC shows no signs of anemia.  Electrolytes normal.  LFTS are also unremarkable.   TSH WNL 2 months ago.  Thyroid test have been up-and-down but most recently normal  .    Assessment/Plan   Problem List Items Addressed This Visit    None  Visit Diagnoses         Codes    Intention tremor     G25.2    both hands with fine motor use of hands a little worse over 3 years 12/2024           His tremor is likely multifactorial.  We talked about reducing caffeine as well as possibly tapering off Wellbutrin and watching tremor.  Wellbutrin is known to cause tremors.  It is also possible he could have essential tremor which we discussed today.  I will send a note to his PCP about weaning off Wellbutrin and giving that at least a month to see what his tremors like.  Also alcohol may be contributing as well.  His thyroid testing most recently has been normal.  We could trial propranolol if the above is not helping and tremor is bothersome.

## 2025-03-18 ENCOUNTER — APPOINTMENT (OUTPATIENT)
Dept: NEUROLOGY | Facility: CLINIC | Age: 65
End: 2025-03-18
Payer: COMMERCIAL

## 2025-04-01 DIAGNOSIS — F33.0 MILD EPISODE OF RECURRENT MAJOR DEPRESSIVE DISORDER (CMS-HCC): ICD-10-CM

## 2025-04-01 RX ORDER — BUPROPION HYDROCHLORIDE 300 MG/1
300 TABLET ORAL EVERY MORNING
Qty: 30 TABLET | Refills: 0 | Status: SHIPPED | OUTPATIENT
Start: 2025-04-01

## 2025-04-05 ENCOUNTER — OFFICE VISIT (OUTPATIENT)
Dept: URGENT CARE | Age: 65
End: 2025-04-05
Payer: COMMERCIAL

## 2025-04-05 DIAGNOSIS — K12.2 UVULITIS: Primary | ICD-10-CM

## 2025-04-05 RX ORDER — AMOXICILLIN 875 MG/1
875 TABLET, FILM COATED ORAL 2 TIMES DAILY
Qty: 14 TABLET | Refills: 0 | Status: SHIPPED | OUTPATIENT
Start: 2025-04-05 | End: 2025-04-12

## 2025-04-05 RX ORDER — PREDNISONE 20 MG/1
20 TABLET ORAL DAILY
Qty: 5 TABLET | Refills: 0 | Status: SHIPPED | OUTPATIENT
Start: 2025-04-05 | End: 2025-04-10

## 2025-04-05 NOTE — PROGRESS NOTES
Urgent Care Virtual Video Visit    Patient Location: home  Provider Location: [facility]    Video visit completed with realtime synchronous video/audio connection. Informed consent was obtained from the patient. Patient was made aware that my evaluation and diagnosis are limited due to the fact that we are not in the same room during the interview and that this is a virtual encounter that took place via videoconferencing. Patient verbalized understanding.     I have communicated my name and active licensure. The pt identity and physical location were verified at the time of this visit. The pt has been informed of the risk, benefit and alternatives to treatment through a remote evaluation and consents to proceed with the evaluation remotely. This visit was conducted using video and audio.    HPI-  This is a 65 yr old male requesting virtual care for uvula swelling x 1 day. No fever, sore throat, or ear pain. Mild URI sxs and cough last few days. No breathing or swallowing difficulty.    ROS-  As stated in the HPI, is otherwise non contributory    PE-  In NAD, cooperative, conversant  Uvula appears mildly swollen and erythematous on camera  PE limited due to virtual platform    Assessment:  Uvuliitis    Plan:  Amox 875 mg bid x 1 week  Prednisone 20 mg daily x 5 days  Soft foods and clear fluids  Pcp follow up this week if not improving or worsening  ER visit anytime 24/7 for acute worsening or changing condition    Patient disposition: Home    Electronically signed by Tosha Hernández PA-C  12:55 PM

## 2025-04-07 NOTE — PROGRESS NOTES
Patient ID: Livan Wen is a 65 y.o. male who presents for Follow-up (Pt here for follow up and review and possibly his second shingrix shot. Pt has been dealing with a lot of family issues and has been taking care of his mother in law for the last month, who has short term memory loss. His Father in law is in hospital right now. Pt is out of Ativan and would like to talk about this. Pt also has a cough for the last 3+ wks that has been non-productive. Some SOB, and wheezing. He had a virtual UC, Uvulitis apt a wk ago and was given ABX and steroid-finished.)    (LAST VISIT PLAN FROM: 12/16/2024:  LDL goal is 60-we increased his rosuvastatin to 40 mg in sept, is due for labs on this.  See patient instructions in wrap up plan, orders and comments for treatment plan.  Patient Instructions:  Please make an appointment with Dr Magaña to follow up on trigger finger left hand acting up and follow up on right hand as well.  Referral written for neurology-aware it may take a few months for an appointment, you need movement disorder-  Dr Jennifer Gaming, Dr David Owen, Dr Maureen Banks.  We went over exercises and the aorta.  Change flovent to arnuity ellipta 100mcg, one inhalation, once per day, rinse mouth after use.   Consider RSV vaccine due to asthma history. At the pharmacy.  Shingrix after the holidays, fist vaccine and then the 2nd one 2-6 months later.  Follow up 3-4 months. Annual is in June or July.  Fasting blood test and no vitamins for 12 hours prior: tomorrow.  END LAST VISIT PLAN)  -------------------------------------------  HPI  Patient is a 65-year-old male who presents today for follow up.    Cough:  Patient reports that he had uvulitis about 1 week ago and was given amoxicillin 875 mg BID on 04/05/2025 for 7 days as well as prednisone 20 mg, 1 tablet daily for 5 days.  We tested for Covid, influenza A and B in office today which were negative.  He states that it started with a dry cough about 2  "weeks prior to this and when he coughed, he could feel his uvula go horizontal towards the front of his mouth.  Denied fever or chills.  Order placed for XR Chest.  Order placed for prednisone 20 mg twice daily for 5 days then one tablet daily for 2 days.  Risks of prednisone therapy discussed, including elevated blood sugars, ulcers, agitation and insomnia, aseptic necrosis of hip, weight gain and fluid retention, and decreased ability to fight infection.    Reactive Airway Disease:  No complaints.  Use of rescue inhaler: Yes.  Medications : Taking them regularly , no side effects.  Patient continues on Arnuity Ellipta, 1 inhalation daily. He will take his albuterol first prior to taking his Arnuity Ellipta.  He is using his albuterol once per day. Recommended using his albuterol three times per day and continue Arnuity the same in the AM.  No SOB, sputum, wheezing. No SOB on exertion.    Tremor:  Patient followed with Dr. Banks, Neurology, on 03/11/2025.  I reviewed the note as follows:  Assessment/Plan  Problem List Items Addressed This Visit    None  Visit Diagnoses           Codes     Intention tremor     G25.2     both hands with fine motor use of hands a little worse over 3 years 12/2024            His tremor is likely multifactorial.  We talked about reducing caffeine as well as possibly tapering off Wellbutrin and watching tremor.  Wellbutrin is known to cause tremors.  It is also possible he could have essential tremor which we discussed today.  I will send a note to his PCP about weaning off Wellbutrin and giving that at least a month to see what his tremors like.  Also alcohol may be contributing as well.  His thyroid testing most recently has been normal.  We could trial propranolol if the above is not helping and tremor is bothersome.      Electronically signed by Maureen Banks MD at 3/11/2025  8:42 AM     Patient reports his tremor is \"about the same\".  He states the symptoms are \"nothing he " "can't deal with\".  He reports that it is not Parkinson's disease.  We discussed that the Neurologist recommended reducing his bupropion.  We discussed that we would hold off for now, as he is dealing with a lot of stress currently with his in-laws and their health problems.    Left Middle Trigger Finger:  Patient followed with Orthopedic Surgery, Dr. Tonja Magaña on 03/12/2025.  I reviewed the note as follows: Patient underwent :         Patient underwent MRI Cardiac Angio Chest on 06/01/2023-it di dnot indicate amyloidosis but more testing may be needed.  He does need a lab work up for the amyloid.     Aortic Aneurysm:  Patient has an aortic aneurysm, 4.2 cm, and follows with Dr. Cota, with upcoming appointment 06/16/2025.       Hypertension:  BP today is 114/70.  Follow up on cardiovascular conditions:  Patient continues on valsartan 320 mg daily.  Cardiac:   Chest pain?No  Palpitations? No  Increased abebe?  No  Other:  Resp:   Shortness of breath?No  Wheezing No  Cough No  Other:  Extremities:   Leg or ankle swelling?No   Claudication?No  Other:  Neuro:  DizzinessNo  SyncopeNo   Blurred visionNo  New headaches No  Other:  Medications:Taking them regularly.Yes  Side effects.No    Hypercholesterolemia:  No myalgias, nausea, abdominal pain.  Meds: Taking regularly, no adverse effect.  Patient continues on Crestor 40 mg daily.  Labs: Last LDL direct was 93 mg/dL on 04/11/2025.  LDL prior to this was 137 mg/dL, cholesterol at 225 mg/dL, and triglycerides at 224 mg/dL on 12/26/2024.    LDL goal: < 60 mg/dL.  Patient had mild atherosclerotic calcification of the coronary arteries on CT Chest 03/23/2022. ASCVD risk 8.9%.  We discussed his elevated LDL and adding Zetia; although, improved since last check.  Patient reports that his diet has not been the greatest since his mother-in-law has been staying with them for 5 weeks and likes sweets.  We discussed holding off for now and rechecking his lipid panel in 3 " "months.    BPH:  No urinary concerns reported today.  Patient continues on Flomax 0.4 mg X2 daily.     GERD:  No symptoms reported today.  Patient continues on Nexium 40 mg X2 daily.     MDD:  LUAN:  Stable.  Patient continues on Wellbutrin  mg daily, Lexapro 5 mg daily, and ativan 0.5 mg PRN anxiety/stress.  Patient reports being under stress with caring for his mother-in-law due to health issues as of late.    Hypothyroidism:  Patient continues on levothyroxine 75 mcg daily.  Last TSH was 4.15 on 04/11/2025.  We discussed that his TSH should be around 1.0 to 2.0.  Patient states that he did mix up his pills for about a week and did not take his levothyroxine correctly.  Will repeat TSH in 3 months.    Social History:  Patient is enjoying [employer], \"it's great\".    Orders placed for prescriptions as required.    Orders placed for UA and blood work today or when prednisone is completed and blood work as required in 3 months (TSH and lipid panel).    Follow up 06/24/2025 for Annual Physical.    Review of Systems  See ROS in HPI    Current Outpatient Medications   Medication Instructions    albuterol 90 mcg/actuation inhaler Inhale 1-2 puffs every 4-6 hours as needed    azelastine (Astelin) 137 mcg (0.1 %) nasal spray 2 sprays, Each Nostril, 2 times daily PRN    benzonatate (TESSALON) 100 mg, oral, 3 times daily PRN, Do not crush or chew.    buPROPion XL (WELLBUTRIN XL) 300 mg, oral, Every morning, Take 1 tablet daily with food in AM    cholecalciferol (VITAMIN D-3) 50 mcg, Daily    cyanocobalamin (Vitamin B-12) 1,000 mcg tablet 1 tablet, Daily    diclofenac sodium 1 % kit Apply to right knee 2 grms twice daily    escitalopram (LEXAPRO) 5 mg, oral, Daily    esomeprazole (NEXIUM) 40 mg, oral, 2 times daily, Take 1/2 hour before breakfast or lunch,and take at bedtime . Must be  from thyroid med by at least  one hour.    ferrous sulfate 325 (65 Fe) MG tablet Take by mouth. Take 1 tablet daily with food " three days per week with supper. Do not take 3 hours of thyroid pill    fluticasone (Flonase) 50 mcg/actuation nasal spray 2 sprays, Each Nostril, Daily, Shake gently. Before first use, prime pump. After use, clean tip and replace cap.    fluticasone furoate (Arnuity Ellipta) 100 mcg/actuation inhaler 1 puff, inhalation, Daily, Rinse mouth with water after use to reduce aftertaste and incidence of candidiasis. Do not swallow.    glucosamine HCl 750 mg tablet 2 tablets, Daily    levothyroxine (SYNTHROID, LEVOXYL) 75 mcg, oral, Daily before breakfast, Take by itself, on an empty stomach and nothing but water for one hour after.    LORazepam (ATIVAN) 0.5 mg, oral, Daily PRN, 1 tab daily if needed for stressful situations    magnesium oxide (MAG-OX) 250 mg, oral, 2 times daily, Avoid taking close to iron    multivitamin tablet 1 tablet, Daily    polyethylene glycol (Glycolax) 17 gram/dose powder Daily RT    predniSONE (Deltasone) 20 mg tablet 2 tabs daily for 5 days then one tab daily for 2 days then stop. Take in the am or lunch with food.    psyllium (Metamucil) powder Take by mouth. Use as directed    rosuvastatin (CRESTOR) 40 mg, oral, Daily    tamsulosin (FLOMAX) 0.4 mg, oral, 2 times daily    valsartan (DIOVAN) 320 mg, oral, Daily     Allergies   Allergen Reactions    Codeine Unknown     Objective    The following test results have been reviewed:  Latest Complete Lab Results:  CBC:   Lab Results   Component Value Date    WBC 6.8 12/06/2023    RBC 4.26 (L) 12/06/2023    HGB 13.9 12/06/2023    HCT 42.6 12/06/2023     12/06/2023    MCH 32.6 12/06/2023    MCHC 32.6 12/06/2023     12/06/2023     CMP:  Lab Results   Component Value Date    GLUCOSE 97 12/06/2023     12/06/2023    K 4.3 12/06/2023     12/06/2023    CO2 28 12/06/2023    ANIONGAP 15 12/06/2023    BUN 18 12/06/2023    CREATININE 1.06 12/06/2023    CALCIUM 9.6 12/06/2023    ALBUMIN 4.5 12/06/2023    ALKPHOS 97 12/06/2023    PROT 7.2  "12/06/2023    AST 20 04/11/2025    BILITOT 0.7 12/06/2023    ALT 20 04/11/2025      Lipid:   Lab Results   Component Value Date    CHOL 168 04/11/2025    HDL 70 04/11/2025    CHHDL 2.4 04/11/2025    LDLF 131 (H) 03/01/2023    VLDL 45 (H) 12/26/2024    TRIG 104 04/11/2025     LDL Direct:  Lab Results   Component Value Date    LDLDIRECT 93 04/11/2025      TSH:   Lab Results   Component Value Date    TSH 4.15 04/11/2025      B12:  Lab Results   Component Value Date    OGYAOLSG33 637 12/06/2023     Vitamin D:  Lab Results   Component Value Date    VITD25 43 12/06/2023      HgA1c:   No results found for: \"HGBA1C\", \"LWBHDGBY5O\"  PSA:   Lab Results   Component Value Date    PSA 0.42 12/06/2023        Physical Exam  Vitals:      9/3/2024    10:30 AM 9/3/2024    11:39 AM 9/9/2024     3:16 PM 9/10/2024     1:58 PM 12/16/2024     8:28 AM 3/11/2025     8:07 AM 4/14/2025     7:56 AM   Vitals   Systolic 134 147 104 129 108 128 114   Diastolic 94 73 64 78 78 90 70   BP Location Left arm     Right arm    Heart Rate 83 80 64 82 68 75 64   Temp  36.4 °C (97.5 °F)  36.6 °C (97.9 °F)  36.4 °C (97.5 °F) 37 °C (98.6 °F)   Resp  17 18  18 12 18   Height 1.829 m (6')  1.854 m (6' 1\")  1.854 m (6' 1\") 1.854 m (6' 1\") 1.829 m (6')   Weight (lb) 224 238.98 236  240 251 243   BMI 30.38 kg/m2 32.41 kg/m2 31.14 kg/m2  31.66 kg/m2 33.12 kg/m2 32.96 kg/m2   BSA (m2) 2.28 m2 2.34 m2 2.35 m2  2.37 m2 2.42 m2 2.36 m2     Patient looks their usual self, is known to me, and is in no obvious distress.  HEENT:   Normocephalic, no facial asymmetry  Eyes: Sclera and conjunctiva are clear.  Neck: No adenopathy cervical (Ant/post/lat), no Supraclavicular nodes.   Thyroid normal.  Carotid pulses normal, no carotid bruits.  Lungs : RR normal. Clear to auscultation anterior, posterior and lateral. No rales, wheezes rhonchi or rubs. Good air exchange.  Heart: RRR. No Murmur, gallop, click or rub.  Abdomen: Bowel sounds normal, No bruits. No pulsatile mass. No " hepatosplenomegaly, masses or tenderness. Soft, no guarding.  Vascular:  Posterior tibialis and dorsalis pedis pulses within normal limits bilaterally.   Extremities: No upper extremity edema. No lower extremity edema.   Musculoskeletal: No synovitis of joints seen. No new deformity.  Neuro: CN 2-12 intact. Alert, appropriate.  Ambulates independently.  No gross motor deficit.   No tremors.  Psych: normal mood and affect.  Skin: No rash, bruising petechiae or jaundice.    Livan was seen today for follow-up.  Diagnoses and all orders for this visit:  Benign essential hypertension (Primary)  -     Basic Metabolic Panel; Future  -     Albumin-Creatinine Ratio, Urine Random; Future  -     Albumin-Creatinine Ratio, Urine Random  Coronary artery calcification seen on CAT scan  Hypercholesterolemia  Comments:  ldl goal less than 60, add zetia  Gastroesophageal reflux disease, unspecified whether esophagitis present  Generalized anxiety disorder  -     LORazepam (Ativan) 0.5 mg tablet; Take 1 tablet (0.5 mg) by mouth once daily as needed for anxiety for up to 10 days. 1 tab daily if needed for stressful situations  Hypothyroidism, unspecified type  Comments:  consider increasing levothyroxine from 75 mcg to 88mcg. has tremor, will hold off and kameron 3 months. ideal tsh is 1.0 -2.0.  Orders:  -     TSH with reflex to Free T4 if abnormal; Future  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)  Mild persistent asthma without complication (HHS-HCC)  -     CBC; Future  -     CBC  Aneurysm of ascending aorta without rupture  Comments:  ldl goal less than 60, discussed adding zetia, diet off x 6 weeks due to illness, predniione/ care ofMIL. will kameron labs in 3 months, diet will be better.  Intention tremor  Comments:  discussed trial dereasing wellbutrin but a lot of stress now apr 2025 -tremor not badk will re discuss future.  Trigger finger, left middle finger  Benign prostatic hyperplasia with incomplete bladder  emptying  -     PSA; Future  -     PSA  Anemia, unspecified type  Vitamin B12 deficiency  -     Vitamin B12; Future  -     CBC; Future  -     Vitamin B12  -     CBC  Vitamin D deficiency  -     Vitamin D 25-Hydroxy,Total (for eval of Vitamin D levels); Future  -     Vitamin D 25-Hydroxy,Total (for eval of Vitamin D levels)  Encounter for monitoring statin therapy  -     Hepatic function panel; Future  -     Lipid panel; Future  -     CK; Future  Medication monitoring encounter  -     TSH with reflex to Free T4 if abnormal; Future  Fatty liver  -     Hepatic function panel; Future  Cough in adult  -     POCT BinaxNOW Covid-19 Ag Card manually resulted  -     POCT ID NOW RSV manually resulted  -     POCT Influenza A/B manually resulted  -     XR chest 2 views; Future  -     benzonatate (Tessalon) 100 mg capsule; Take 1 capsule (100 mg) by mouth 3 times a day as needed for cough. Do not crush or chew.  Amyloidosis, unspecified type (Multi)  Comments:  noted on tendon biopsy feb 2025  Dupuytren's contracture of left hand  Comments:  surgery 2-25-25  SOBOE (shortness of breath on exertion)  -     albuterol 90 mcg/actuation inhaler; Inhale 1-2 puffs every 4-6 hours as needed  TSH elevation  Mild persistent asthma with acute exacerbation ([facility])  -     predniSONE (Deltasone) 20 mg tablet; 2 tabs daily for 5 days then one tab daily for 2 days then stop. Take in the am or lunch with food.  -     XR chest 2 views; Future    Asthma exacerbation is not resolved, will re dose prednisone, increase bronchodilator use, tessalon for cough.  Risks of prednisone therapy discussed, including elevated blood sugars, ulcers, agitation and insomnia, aseptic necrosis of hip, weight gain and fluid retention, and decreased ability to fight infection.    Amyloidosis, note sent to cardio. Consider other referral as well. After pt left: Will place additional lab orders in addition to what we discussed with the patient for urine and blood  testing to evaluate amyloid. Note sent to neuro dr hidalgo as well. Consider hematology but will see if any protein electrophoresis urine/serum or light chain abnormalities are found. Defer to dr vargas for any additional cardiac imaging, consider abdominal ct and pelvis but will see how labs are first.    See patient instructions in wrap up plan, orders and comments for treatment plan.  Patient Instructions:   Increase albuterol inhaler to 2 inhalations three times per day for your cough.  Continue arnuity once per day in the am.    Prednisone 20mg tabs: take 2 tabs daily once per day for 5 days then one tab daily once per day for 2 days.    Benzonatate swallow one up to three times per day as needed for the cough    Chest xray today.    Continue rosuvastatin 40 mg daily. 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    Blood and urine test today or as soon as off prednisone.    Fasting blood test in 3-4months-this does not need to be before your June appt., no vitamins supplements or thyroid pill that day until after your blood is drawn.    Goal LDL cholesterol is under 60.  Prefer TSH be around 1.0 to 2.0      I am the Internal Medicine physician providing ongoing chronic medical care for this patient, which is managed during and in between office visits.    Scribe Attestation  By signing my name below, IInessa, Bro   attest that this documentation has been prepared under the direction and in the presence of Sonia Nguyễn MD.   Time prep 8 min  60 minutes visit.

## 2025-04-12 LAB
ALT SERPL-CCNC: 20 U/L (ref 9–46)
AST SERPL-CCNC: 20 U/L (ref 10–35)
CHOLEST SERPL-MCNC: 168 MG/DL
CHOLEST/HDLC SERPL: 2.4 (CALC)
CK SERPL-CCNC: 172 U/L (ref 22–308)
HDLC SERPL-MCNC: 70 MG/DL
LDLC SERPL CALC-MCNC: 79 MG/DL (CALC)
LDLC SERPL DIRECT ASSAY-MCNC: 93 MG/DL
NONHDLC SERPL-MCNC: 98 MG/DL (CALC)
TRIGL SERPL-MCNC: 104 MG/DL
TSH SERPL-ACNC: 4.15 MIU/L (ref 0.4–4.5)

## 2025-04-13 NOTE — PATIENT INSTRUCTIONS
Increase albuterol inhaler to 2 inhalations three times per day for your cough.  Continue arnuity once per day in the am.    Prednisone 20mg tabs: take 2 tabs daily once per day for 5 days then one tab daily once per day for 2 days.    Benzonatate swallow one up to three times per day as needed for the cough    Chest xray today.    Continue rosuvastatin 40 mg daily. 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    Blood and urine test today or as soon as off prednisone.    Fasting blood test in 3-4months-this does not need to be before your June appt., no vitamins supplements or thyroid pill that day until after your blood is drawn.    Goal LDL cholesterol is under 60.  Prefer TSH be around 1.0 to 2.0

## 2025-04-14 ENCOUNTER — APPOINTMENT (OUTPATIENT)
Dept: PRIMARY CARE | Facility: CLINIC | Age: 65
End: 2025-04-14
Payer: COMMERCIAL

## 2025-04-14 ENCOUNTER — HOSPITAL ENCOUNTER (OUTPATIENT)
Dept: RADIOLOGY | Facility: CLINIC | Age: 65
Discharge: HOME | End: 2025-04-14
Payer: COMMERCIAL

## 2025-04-14 ENCOUNTER — TELEPHONE (OUTPATIENT)
Dept: PRIMARY CARE | Facility: CLINIC | Age: 65
End: 2025-04-14

## 2025-04-14 VITALS
BODY MASS INDEX: 32.91 KG/M2 | RESPIRATION RATE: 18 BRPM | TEMPERATURE: 98.6 F | SYSTOLIC BLOOD PRESSURE: 114 MMHG | WEIGHT: 243 LBS | DIASTOLIC BLOOD PRESSURE: 70 MMHG | OXYGEN SATURATION: 95 % | HEIGHT: 72 IN | HEART RATE: 64 BPM

## 2025-04-14 DIAGNOSIS — R39.14 BENIGN PROSTATIC HYPERPLASIA WITH INCOMPLETE BLADDER EMPTYING: ICD-10-CM

## 2025-04-14 DIAGNOSIS — E53.8 VITAMIN B12 DEFICIENCY: ICD-10-CM

## 2025-04-14 DIAGNOSIS — R06.02 SOBOE (SHORTNESS OF BREATH ON EXERTION): ICD-10-CM

## 2025-04-14 DIAGNOSIS — E03.9 HYPOTHYROIDISM, UNSPECIFIED TYPE: ICD-10-CM

## 2025-04-14 DIAGNOSIS — K21.9 GASTROESOPHAGEAL REFLUX DISEASE, UNSPECIFIED WHETHER ESOPHAGITIS PRESENT: ICD-10-CM

## 2025-04-14 DIAGNOSIS — M65.332 TRIGGER FINGER, LEFT MIDDLE FINGER: ICD-10-CM

## 2025-04-14 DIAGNOSIS — R05.9 COUGH IN ADULT: ICD-10-CM

## 2025-04-14 DIAGNOSIS — Z79.899 ENCOUNTER FOR MONITORING STATIN THERAPY: ICD-10-CM

## 2025-04-14 DIAGNOSIS — K76.0 FATTY LIVER: ICD-10-CM

## 2025-04-14 DIAGNOSIS — J45.31 MILD PERSISTENT ASTHMA WITH ACUTE EXACERBATION (HHS-HCC): ICD-10-CM

## 2025-04-14 DIAGNOSIS — Z51.81 ENCOUNTER FOR MONITORING STATIN THERAPY: ICD-10-CM

## 2025-04-14 DIAGNOSIS — N40.1 BENIGN PROSTATIC HYPERPLASIA WITH INCOMPLETE BLADDER EMPTYING: ICD-10-CM

## 2025-04-14 DIAGNOSIS — R79.89 TSH ELEVATION: ICD-10-CM

## 2025-04-14 DIAGNOSIS — E55.9 VITAMIN D DEFICIENCY: ICD-10-CM

## 2025-04-14 DIAGNOSIS — E78.00 HYPERCHOLESTEROLEMIA: ICD-10-CM

## 2025-04-14 DIAGNOSIS — I10 BENIGN ESSENTIAL HYPERTENSION: ICD-10-CM

## 2025-04-14 DIAGNOSIS — G25.2 INTENTION TREMOR: ICD-10-CM

## 2025-04-14 DIAGNOSIS — E85.9 AMYLOIDOSIS, UNSPECIFIED TYPE (MULTI): ICD-10-CM

## 2025-04-14 DIAGNOSIS — I25.10 CORONARY ARTERY CALCIFICATION SEEN ON CAT SCAN: ICD-10-CM

## 2025-04-14 DIAGNOSIS — I10 BENIGN ESSENTIAL HYPERTENSION: Primary | ICD-10-CM

## 2025-04-14 DIAGNOSIS — F41.1 GENERALIZED ANXIETY DISORDER: ICD-10-CM

## 2025-04-14 DIAGNOSIS — J45.30 MILD PERSISTENT ASTHMA WITHOUT COMPLICATION (HHS-HCC): ICD-10-CM

## 2025-04-14 DIAGNOSIS — F33.0 MILD EPISODE OF RECURRENT MAJOR DEPRESSIVE DISORDER (CMS-HCC): ICD-10-CM

## 2025-04-14 DIAGNOSIS — Z51.81 MEDICATION MONITORING ENCOUNTER: ICD-10-CM

## 2025-04-14 DIAGNOSIS — M72.0 DUPUYTREN'S CONTRACTURE OF LEFT HAND: ICD-10-CM

## 2025-04-14 DIAGNOSIS — I71.21 ANEURYSM OF ASCENDING AORTA WITHOUT RUPTURE: Chronic | ICD-10-CM

## 2025-04-14 LAB
POC BINAX EXPIRATION: NORMAL
POC BINAX NOW COVID SERIAL NUMBER: NORMAL
POC RAPID INFLUENZA A: NEGATIVE
POC RAPID INFLUENZA B: NEGATIVE
POC RSV PCR RESULT: NEGATIVE
POC SARS-COV-2 AG BINAX: NORMAL

## 2025-04-14 PROCEDURE — 87811 SARS-COV-2 COVID19 W/OPTIC: CPT | Performed by: INTERNAL MEDICINE

## 2025-04-14 PROCEDURE — 71046 X-RAY EXAM CHEST 2 VIEWS: CPT | Performed by: RADIOLOGY

## 2025-04-14 PROCEDURE — 1123F ACP DISCUSS/DSCN MKR DOCD: CPT | Performed by: INTERNAL MEDICINE

## 2025-04-14 PROCEDURE — 99215 OFFICE O/P EST HI 40 MIN: CPT | Performed by: INTERNAL MEDICINE

## 2025-04-14 PROCEDURE — 3078F DIAST BP <80 MM HG: CPT | Performed by: INTERNAL MEDICINE

## 2025-04-14 PROCEDURE — 3074F SYST BP LT 130 MM HG: CPT | Performed by: INTERNAL MEDICINE

## 2025-04-14 PROCEDURE — 87804 INFLUENZA ASSAY W/OPTIC: CPT | Performed by: INTERNAL MEDICINE

## 2025-04-14 PROCEDURE — 3008F BODY MASS INDEX DOCD: CPT | Performed by: INTERNAL MEDICINE

## 2025-04-14 PROCEDURE — 71046 X-RAY EXAM CHEST 2 VIEWS: CPT

## 2025-04-14 PROCEDURE — 99417 PROLNG OP E/M EACH 15 MIN: CPT | Performed by: INTERNAL MEDICINE

## 2025-04-14 PROCEDURE — 1159F MED LIST DOCD IN RCRD: CPT | Performed by: INTERNAL MEDICINE

## 2025-04-14 PROCEDURE — 1126F AMNT PAIN NOTED NONE PRSNT: CPT | Performed by: INTERNAL MEDICINE

## 2025-04-14 PROCEDURE — 1158F ADVNC CARE PLAN TLK DOCD: CPT | Performed by: INTERNAL MEDICINE

## 2025-04-14 PROCEDURE — 87634 RSV DNA/RNA AMP PROBE: CPT | Performed by: INTERNAL MEDICINE

## 2025-04-14 RX ORDER — LORAZEPAM 0.5 MG/1
0.5 TABLET ORAL DAILY PRN
Qty: 10 TABLET | Refills: 0 | Status: SHIPPED | OUTPATIENT
Start: 2025-04-14 | End: 2025-04-24

## 2025-04-14 RX ORDER — BENZONATATE 100 MG/1
100 CAPSULE ORAL 3 TIMES DAILY PRN
Qty: 42 CAPSULE | Refills: 0 | Status: SHIPPED | OUTPATIENT
Start: 2025-04-14 | End: 2025-05-14

## 2025-04-14 RX ORDER — ALBUTEROL SULFATE 90 UG/1
INHALANT RESPIRATORY (INHALATION)
Qty: 18 G | Refills: 11 | Status: SHIPPED | OUTPATIENT
Start: 2025-04-14

## 2025-04-14 RX ORDER — PREDNISONE 20 MG/1
TABLET ORAL
Qty: 12 TABLET | Refills: 8 | Status: SHIPPED | OUTPATIENT
Start: 2025-04-14

## 2025-04-14 ASSESSMENT — PAIN SCALES - GENERAL: PAINLEVEL_OUTOF10: 0-NO PAIN

## 2025-04-14 NOTE — TELEPHONE ENCOUNTER
Please call him. I reviewed his chart and have additional testing he needs because of the amyloidosis diagnosis. This is both urine and blood testing and different then the few tests we said to have him do either today or after done with the prednisone.  He should do all of these several days AFTER done with the prednisone, wait a little longer than we initially discussed. Orders are in.    If he DID urine and blood testing on April 14 the day of his visit, then he still needs to do the additional blood and urine testing a few days or a week after done with the presnisone.    He also needs to do the follow up labs fasting for the cholesterol meds in later July as we discussed.

## 2025-04-15 LAB
25(OH)D3+25(OH)D2 SERPL-MCNC: 76 NG/ML (ref 30–100)
ALBUMIN/CREAT UR: 2 MG/G CREAT
BASOPHILS # BLD AUTO: 78 CELLS/UL (ref 0–200)
BASOPHILS NFR BLD AUTO: 1 %
BUN SERPL-MCNC: 15 MG/DL (ref 7–25)
BUN/CREAT SERPL: NORMAL (CALC) (ref 6–22)
CALCIUM SERPL-MCNC: 9.7 MG/DL (ref 8.6–10.3)
CHLORIDE SERPL-SCNC: 104 MMOL/L (ref 98–110)
CO2 SERPL-SCNC: 24 MMOL/L (ref 20–32)
CREAT SERPL-MCNC: 0.89 MG/DL (ref 0.7–1.35)
CREAT UR-MCNC: 86 MG/DL (ref 20–320)
EGFRCR SERPLBLD CKD-EPI 2021: 95 ML/MIN/1.73M2
EOSINOPHIL # BLD AUTO: 140 CELLS/UL (ref 15–500)
EOSINOPHIL NFR BLD AUTO: 1.8 %
ERYTHROCYTE [DISTWIDTH] IN BLOOD BY AUTOMATED COUNT: 12.7 % (ref 11–15)
GLUCOSE SERPL-MCNC: 101 MG/DL (ref 65–139)
HCT VFR BLD AUTO: 43.6 % (ref 38.5–50)
HGB BLD-MCNC: 14.5 G/DL (ref 13.2–17.1)
LYMPHOCYTES # BLD AUTO: 1287 CELLS/UL (ref 850–3900)
LYMPHOCYTES NFR BLD AUTO: 16.5 %
MCH RBC QN AUTO: 32.3 PG (ref 27–33)
MCHC RBC AUTO-ENTMCNC: 33.3 G/DL (ref 32–36)
MCV RBC AUTO: 97.1 FL (ref 80–100)
MICROALBUMIN UR-MCNC: 0.2 MG/DL
MONOCYTES # BLD AUTO: 725 CELLS/UL (ref 200–950)
MONOCYTES NFR BLD AUTO: 9.3 %
NEUTROPHILS # BLD AUTO: 5569 CELLS/UL (ref 1500–7800)
NEUTROPHILS NFR BLD AUTO: 71.4 %
PLATELET # BLD AUTO: 290 THOUSAND/UL (ref 140–400)
PMV BLD REES-ECKER: 9.3 FL (ref 7.5–12.5)
POTASSIUM SERPL-SCNC: 4.2 MMOL/L (ref 3.5–5.3)
PSA SERPL-MCNC: 0.39 NG/ML
RBC # BLD AUTO: 4.49 MILLION/UL (ref 4.2–5.8)
SODIUM SERPL-SCNC: 138 MMOL/L (ref 135–146)
VIT B12 SERPL-MCNC: 907 PG/ML (ref 200–1100)
WBC # BLD AUTO: 7.8 THOUSAND/UL (ref 3.8–10.8)

## 2025-04-15 NOTE — RESULT ENCOUNTER NOTE
Please call the patient regarding his result. Chest xray di dnot show pneumonia, did show evidence of his asthma. Blood test initial is ok but there are additional tests he needs blood and urine due to amyloid-he needs to get these done as well--sooner not later.

## 2025-04-16 NOTE — RESULT ENCOUNTER NOTE
[facility]    PATIENT NAME:  Kristin Bradley    MRN:  [id number]  SERVICE DATE:  4/19/2022   SERVICE TIME:  12:19 PM    Primary Care Physician: Lennox Abler, MD     SUBJECTIVE  Consult reason: Medical management s/p right hemicolectomy for tubular adenoma    HPI:  Kristin Bradley is a 70 y.o., , male admitted by surgery team for right hemicolectomy with primary anastomosis in setting of tubular adenoma. PMH significant for  has a past medical history of Arthritis, Hyperlipidemia, Hypertension, and Sleep apnea. .      Patient underwent right hemicolectomy with primary anastomosis with surgery team on 4/19/2022. Hospitalist services consulted for admission. In addition to past medical history listed above, patient additionally reports drinking 3-4 glasses of wine per day on average. He has no prior episodes of acute alcohol withdrawal or seizure activity. On examination postoperatively on the medical floor, patient is in no acute distress, wonders when he can have a normal diet back, has no new/acute complaints. Reports postoperative surgical site achiness, well controlled with pain management by primary team.  No nausea/vomiting. No other acute complaints at present. Patient is a full code.        PAST MEDICAL HISTORY:    Past Medical History:   Diagnosis Date    Arthritis     hips    Hyperlipidemia     meds > 15 yrs    Hypertension     meds > 10 yrs    Sleep apnea      PAST SURGICAL HISTORY:    Past Surgical History:   Procedure Laterality Date    BACK SURGERY      CATARACT REMOVAL Left 03/08/2017    CERVICAL FUSION  1998    COLONOSCOPY  03/15/2016    polyps diverticilosis, hemorrhoids    COLONOSCOPY  2013    COLONOSCOPY N/A 3/10/2022    COLORECTAL CANCER SCREENING, HIGH RISK performed by Nai Alfaro MD at [address] [place] [address] with IOL OU    EYE SURGERY      left repair detached retina    EYE Pt aware of results and recommendations.    SURGERY      right laser for retinal tear    JOINT REPLACEMENT      julio hip replacements    LAMINECTOMY      L4    RI TOTAL HIP ARTHROPLASTY Right 8/3/2018    RIGHT TOTAL HIP ARTHROPLASTY performed by Vicki Pendleton MD at [address] Left 2018    LEFT TOTAL HIP ARTHROPLASTY, LATERAL DECUB, JOY 62 TM CUP 13 STEM EXTENDED OFFSET 36 HEAD O NECK performed by Vicki Pendleton MD at [address] [facility] chest squamous cell cancer    TONSILLECTOMY      as child     FAMILY HISTORY:    Family History   Problem Relation Age of Onset    Breast Cancer Mother     Cancer Father         kidney cancer    Stroke Father     Heart Disease Father         2-3 vessel bypass    Arthritis Father     No Known Problems Sister     Arthritis Brother     Cancer Brother         prostate cancer    High Blood Pressure Brother     Depression Daughter     Other Daughter         fibromyalgia    Thyroid Disease Daughter     Sleep Apnea Daughter     Colon Cancer Neg Hx      SOCIAL HISTORY:    Social History     Socioeconomic History    Marital status:      Spouse name: Not on file    Number of children: Not on file    Years of education: Not on file    Highest education level: Not on file   Occupational History    Not on file   Tobacco Use    Smoking status: Former Smoker     Packs/day: 1.00     Years: 2.00     Pack years: 2.00     Quit date: 1976     Years since quittin.2    Smokeless tobacco: Never Used    Tobacco comment: smoked at age 25s x 2 yrs / 1ppd   Vaping Use    Vaping Use: Never used   Substance and Sexual Activity    Alcohol use:  Yes     Alcohol/week: 0.0 standard drinks     Comment: 3-4 glasses wine per day    Drug use: No    Sexual activity: Not on file   Other Topics Concern    Not on file   Social History Narrative    Not on file     Social Determinants of Health     Financial Resource Strain:     Difficulty of Paying Living Expenses: Not on file   Food Insecurity:     Worried About Running Out of Food in the Last Year: Not on file    Gilbert of Food in the Last Year: Not on file   Transportation Needs:     Lack of Transportation (Medical): Not on file    Lack of Transportation (Non-Medical): Not on file   Physical Activity:     Days of Exercise per Week: Not on file    Minutes of Exercise per Session: Not on file   Stress:     Feeling of Stress : Not on file   Social Connections:     Frequency of Communication with Friends and Family: Not on file    Frequency of Social Gatherings with Friends and Family: Not on file    Attends Protestant Services: Not on file    Active Member of [address] [place] [address] [employer] or Organizations: Not on file    Attends Club or Organization Meetings: Not on file    Marital Status: Not on file   Intimate Partner Violence:     Fear of Current or Ex-Partner: Not on file    Emotionally Abused: Not on file    Physically Abused: Not on file    Sexually Abused: Not on file   Housing Stability:     Unable to Pay for Housing in the Last Year: Not on file    Number of [place] in the Last Year: Not on file    Unstable Housing in the Last Year: Not on file     MEDICATIONS:   Prior to Admission medications    Medication Sig Start Date End Date Taking? Authorizing Provider   CPAP Machine MISC by Does not apply route New CPAP with 5 and and CPAP supply.  12/15/21   Nani Mcelroy MD   metoprolol succinate (TOPROL XL) 50 MG extended release tablet Take 1 tablet by mouth daily 1/14/19   Padma Toribio MD   lisinopril (PRINIVIL;ZESTRIL) 10 MG tablet Take 1 tablet by mouth daily  Patient taking differently: Take 20 mg by mouth daily  1/14/19   Padma Toribio MD   citalopram (CELEXA) 10 MG tablet Take 1 tablet by mouth daily 1/14/19   Padma Toribio MD   amLODIPine (NORVASC) 5 MG tablet Take 1 tablet by mouth daily 1/14/19   Padma Toribio MD   rosuvastatin (CRESTOR) 5 MG tablet Take 1 tablet by mouth daily 1/14/19   Max Angeles MD   aspirin 81 MG EC tablet Take 1 tablet by mouth 2 times daily 11/16/18 3/10/22  Glenroy Dover APRN - CNP   Respiratory Therapy Supplies SHELIA New CPAP mask and supplies 10/23/18   aJcqui Bedoya MD   CPAP Machine MISC by Does not apply route    Historical Provider, MD       ALLERGIES: Seasonal and Azithromycin    REVIEW OF SYSTEM:   A full 12 point review of systems was completed, and was unremarkable except as specified above. OBJECTIVE    /74   Pulse 60   Temp 96.6 °F (35.9 °C) (Temporal)   Resp 11   Ht 6' (1.829 m)   Wt 210 lb (95.3 kg)   SpO2 99%   BMI 28.48 kg/m²     PHYSICAL EXAM:     Constitutional: Elderly male reclining in bed no acute distress. Family present at bedside. Head: NCAT  Eyes: PERRLA, EOMI  Neck: Trachea midline, phonation normal  Cardiovascular: RRR. Warm and well perfused peripherally. No significant murmurs appreciated. No pretibial edema. Pulmonary: Normal rate and effort of respiration on room air. Grossly clear to auscultation bilaterally. Abdomen: Soft, nontense, non-distended. Postoperative surgical site dressing over vertical incision just right of midline, C/D/I. No bowel sounds appreciated today. Neurologic: Grossly alert and oriented. No focal neurologic deficits appreciated. No tremors appreciated. Psychiatric: Pleasant, calm, cooperative. DATA:     Diagnostic tests reviewed for today's visit:    Most recent labs and imaging results reviewed. LABS:  No results found for this or any previous visit (from the past 24 hour(s)). **(Ordered, pending at time of exam)**    IMAGING:  No results found.       ASSESSMENT AND PLAN    Active problems:  · Right hemicolectomy with primary anastomosis for tubular adenoma    Chronic problems:  · Alcohol dependence without prior withdrawal  · MUNA on home CPAP  · Hypertension  · Hyperlipidemia  · Osteoarthritis      Plan:  · Postoperative management per primary team  · SABINE protocol for alcohol withdrawal prophylaxis  · Daily labs to include CBC, CMP, magnesium. · Check phosphorus now next a.m.  · Home CPAP brought in by wife. Patient to wear nightly while sleeping. · Continue/hold home medications as ordered.         SIGNATURECatterrell Montgomery DO  DATE: April 19, 2022  TIME: 12:19 PM

## 2025-05-05 DIAGNOSIS — F33.0 MILD EPISODE OF RECURRENT MAJOR DEPRESSIVE DISORDER: ICD-10-CM

## 2025-05-06 RX ORDER — BUPROPION HYDROCHLORIDE 300 MG/1
300 TABLET ORAL EVERY MORNING
Qty: 90 TABLET | Refills: 3 | Status: SHIPPED | OUTPATIENT
Start: 2025-05-06

## 2025-05-13 DIAGNOSIS — F33.0 MILD EPISODE OF RECURRENT MAJOR DEPRESSIVE DISORDER: ICD-10-CM

## 2025-05-13 RX ORDER — ESCITALOPRAM OXALATE 5 MG/1
5 TABLET ORAL DAILY
Qty: 90 TABLET | Refills: 1 | Status: SHIPPED | OUTPATIENT
Start: 2025-05-13

## 2025-05-22 DIAGNOSIS — N40.1 BENIGN PROSTATIC HYPERPLASIA WITH INCOMPLETE BLADDER EMPTYING: ICD-10-CM

## 2025-05-22 DIAGNOSIS — R39.14 BENIGN PROSTATIC HYPERPLASIA WITH INCOMPLETE BLADDER EMPTYING: ICD-10-CM

## 2025-05-23 RX ORDER — TAMSULOSIN HYDROCHLORIDE 0.4 MG/1
0.4 CAPSULE ORAL 2 TIMES DAILY
Qty: 180 CAPSULE | Refills: 2 | Status: SHIPPED | OUTPATIENT
Start: 2025-05-23

## 2025-06-11 ENCOUNTER — TELEPHONE (OUTPATIENT)
Dept: CARDIOLOGY | Facility: HOSPITAL | Age: 65
End: 2025-06-11
Payer: COMMERCIAL

## 2025-06-11 DIAGNOSIS — I10 BENIGN ESSENTIAL HYPERTENSION: ICD-10-CM

## 2025-06-11 DIAGNOSIS — I71.21 ANEURYSM OF ASCENDING AORTA WITHOUT RUPTURE: Primary | ICD-10-CM

## 2025-06-11 LAB
ANION GAP SERPL CALCULATED.4IONS-SCNC: 11 MMOL/L (CALC) (ref 7–17)
BUN SERPL-MCNC: 11 MG/DL (ref 7–25)
BUN/CREAT SERPL: NORMAL (CALC) (ref 6–22)
CALCIUM SERPL-MCNC: 9.8 MG/DL (ref 8.6–10.3)
CHLORIDE SERPL-SCNC: 101 MMOL/L (ref 98–110)
CO2 SERPL-SCNC: 27 MMOL/L (ref 20–32)
CREAT SERPL-MCNC: 0.93 MG/DL (ref 0.7–1.35)
EGFRCR SERPLBLD CKD-EPI 2021: 91 ML/MIN/1.73M2
GLUCOSE SERPL-MCNC: 88 MG/DL (ref 65–99)
POTASSIUM SERPL-SCNC: 4.4 MMOL/L (ref 3.5–5.3)
SODIUM SERPL-SCNC: 139 MMOL/L (ref 135–146)

## 2025-06-11 PROCEDURE — 83521 IG LIGHT CHAINS FREE EACH: CPT

## 2025-06-11 PROCEDURE — 84155 ASSAY OF PROTEIN SERUM: CPT

## 2025-06-12 ENCOUNTER — LAB (OUTPATIENT)
Dept: LAB | Facility: HOSPITAL | Age: 65
End: 2025-06-12
Payer: COMMERCIAL

## 2025-06-12 LAB
25(OH)D3+25(OH)D2 SERPL-MCNC: 73 NG/ML (ref 30–100)
ALBUMIN/CREAT UR: 49 MG/G CREAT
APTT PPP: 26 SEC (ref 23–32)
BASOPHILS # BLD AUTO: 53 CELLS/UL (ref 0–200)
BASOPHILS NFR BLD AUTO: 0.8 %
BNP SERPL-MCNC: 22 PG/ML
CHOLEST SERPL-MCNC: 178 MG/DL
CHOLEST/HDLC SERPL: 2.7 (CALC)
CREAT UR-MCNC: 74 MG/DL (ref 20–320)
CRP SERPL-MCNC: <3 MG/L
EOSINOPHIL # BLD AUTO: 251 CELLS/UL (ref 15–500)
EOSINOPHIL NFR BLD AUTO: 3.8 %
ERYTHROCYTE [DISTWIDTH] IN BLOOD BY AUTOMATED COUNT: 12.8 % (ref 11–15)
HCT VFR BLD AUTO: 41.1 % (ref 38.5–50)
HDLC SERPL-MCNC: 65 MG/DL
HGB BLD-MCNC: 13.7 G/DL (ref 13.2–17.1)
INR PPP: 0.9
KAPPA LC SERPL-MCNC: 1.85 MG/DL (ref 0.33–1.94)
KAPPA LC/LAMBDA SER: 1.35 {RATIO} (ref 0.26–1.65)
LAMBDA LC SERPL-MCNC: 1.37 MG/DL (ref 0.57–2.63)
LDH SERPL P TO L-CCNC: 169 U/L (ref 120–250)
LDLC SERPL CALC-MCNC: 94 MG/DL (CALC)
LDLC SERPL DIRECT ASSAY-MCNC: 102 MG/DL
LYMPHOCYTES # BLD AUTO: 878 CELLS/UL (ref 850–3900)
LYMPHOCYTES NFR BLD AUTO: 13.3 %
MCH RBC QN AUTO: 32.3 PG (ref 27–33)
MCHC RBC AUTO-ENTMCNC: 33.3 G/DL (ref 32–36)
MCV RBC AUTO: 96.9 FL (ref 80–100)
MICROALBUMIN UR-MCNC: 3.6 MG/DL
MONOCYTES # BLD AUTO: 568 CELLS/UL (ref 200–950)
MONOCYTES NFR BLD AUTO: 8.6 %
NEUTROPHILS # BLD AUTO: 4851 CELLS/UL (ref 1500–7800)
NEUTROPHILS NFR BLD AUTO: 73.5 %
NONHDLC SERPL-MCNC: 113 MG/DL (CALC)
PLATELET # BLD AUTO: 275 THOUSAND/UL (ref 140–400)
PMV BLD REES-ECKER: 8.9 FL (ref 7.5–12.5)
PROT SERPL-MCNC: 7 G/DL (ref 6.4–8.2)
PROTHROMBIN TIME: 10 SEC (ref 9–11.5)
PSA SERPL-MCNC: 0.37 NG/ML
RBC # BLD AUTO: 4.24 MILLION/UL (ref 4.2–5.8)
TRIGL SERPL-MCNC: 101 MG/DL
TSH SERPL-ACNC: 2.43 MIU/L (ref 0.4–4.5)
VIT B12 SERPL-MCNC: 768 PG/ML (ref 200–1100)
WBC # BLD AUTO: 6.6 THOUSAND/UL (ref 3.8–10.8)

## 2025-06-13 ENCOUNTER — HOSPITAL ENCOUNTER (OUTPATIENT)
Dept: RADIOLOGY | Facility: CLINIC | Age: 65
Discharge: HOME | End: 2025-06-13
Payer: COMMERCIAL

## 2025-06-13 ENCOUNTER — APPOINTMENT (OUTPATIENT)
Dept: RADIOLOGY | Facility: CLINIC | Age: 65
End: 2025-06-13
Payer: COMMERCIAL

## 2025-06-13 DIAGNOSIS — I71.21 ANEURYSM OF ASCENDING AORTA WITHOUT RUPTURE: ICD-10-CM

## 2025-06-13 PROCEDURE — 2550000001 HC RX 255 CONTRASTS: Performed by: INTERNAL MEDICINE

## 2025-06-13 PROCEDURE — 71275 CT ANGIOGRAPHY CHEST: CPT

## 2025-06-13 RX ADMIN — IOHEXOL 75 ML: 350 INJECTION, SOLUTION INTRAVENOUS at 13:06

## 2025-06-16 ENCOUNTER — TELEMEDICINE (OUTPATIENT)
Dept: CARDIOLOGY | Facility: HOSPITAL | Age: 65
End: 2025-06-16
Payer: COMMERCIAL

## 2025-06-16 DIAGNOSIS — I25.10 CORONARY ARTERY CALCIFICATION SEEN ON CAT SCAN: Primary | ICD-10-CM

## 2025-06-16 DIAGNOSIS — I71.21 ANEURYSM OF ASCENDING AORTA WITHOUT RUPTURE: ICD-10-CM

## 2025-06-16 DIAGNOSIS — E85.9 AMYLOIDOSIS, UNSPECIFIED TYPE (MULTI): ICD-10-CM

## 2025-06-16 DIAGNOSIS — E78.00 HYPERCHOLESTEROLEMIA: ICD-10-CM

## 2025-06-16 DIAGNOSIS — I10 BENIGN ESSENTIAL HYPERTENSION: ICD-10-CM

## 2025-06-16 LAB
ALBUMIN: 4.2 G/DL (ref 3.4–5)
ALPHA 1 GLOBULIN: 0.4 G/DL (ref 0.2–0.6)
ALPHA 2 GLOBULIN: 0.8 G/DL (ref 0.4–1.1)
BETA GLOBULIN: 0.8 G/DL (ref 0.5–1.2)
GAMMA GLOBULIN: 0.8 G/DL (ref 0.5–1.4)
IMMUNOFIXATION COMMENT: NORMAL
PATH REVIEW - SERUM IMMUNOFIXATION: NORMAL
PATH REVIEW-SERUM PROTEIN ELECTROPHORESIS: NORMAL
PROTEIN ELECTROPHORESIS COMMENT: NORMAL

## 2025-06-16 PROCEDURE — 1159F MED LIST DOCD IN RCRD: CPT | Performed by: INTERNAL MEDICINE

## 2025-06-16 PROCEDURE — 99214 OFFICE O/P EST MOD 30 MIN: CPT | Performed by: INTERNAL MEDICINE

## 2025-06-16 PROCEDURE — 1036F TOBACCO NON-USER: CPT | Performed by: INTERNAL MEDICINE

## 2025-06-16 NOTE — PATIENT INSTRUCTIONS
Nice to see you today Mr. Wen.  Continue current medications.    We will reach out to genetics for an appt.    See you back for follow up.    Zuleyka Cota MD  Co-Director, Vascular Center  [facility] [place], [facility]   [facility] Master Clinician in Fibromuscular Dysplasia and Vascular Care  Professor of Medicine  [facility]

## 2025-06-16 NOTE — LETTER
June 16, 2025     Sonia Nguyễn MD  [address]  [facility], [address]  [facility] [address]    Patient: Livan Wen   YOB: 1960   Date of Visit: 6/16/2025       Dear Dr. Sonia Nguyễn MD:    Thank you for referring Livan Wen to me for evaluation. Below are my notes for this consultation.  If you have questions, please do not hesitate to call me. I look forward to following your patient along with you.       Sincerely,     Zuleyka Cota MD      CC: Nadiya De La Torre, APRN-CNP  Beena Fuentes, APRN-CNP  ______________________________________________________________________________________    06/16/25    Cardiovascular Medicine    Virtual or Telephone Consent    An interactive audio and video telecommunication system which permits real time communications between the patient (at the originating site) and provider (at the distant site) was utilized to provide this telehealth service.   Verbal consent was requested and obtained from Livan Wen on this date, 06/16/25 for a telehealth visit and the patient's location was confirmed at the time of the visit.      History of Present Illness  This terrific 65 y.o. year-old man is in f/u of ascending aortic aneurysm with multiple thoracic imaging studies over the years dating back to 2014 (see below) with some increase in TAA dimension. He has a chronic chest pain syndrome with prior stress testing done and has exertional dyspnea. He has had HTN for > 10 years and hyperlipidemia with coronary calcium score > 0 now on rosuvastatin.     No hx of acute coronary syndrome or acute aortic syndrome. No known cardiac murmur. He has no family hx of aortic aneurysms or dissections. He has 4 children in their 20s and 30s.    His grand son is 1 year old today.     Since I saw him last year, he has stable infrequent chest pain atypical for angina or aortic disease (had stress test last year). Bps ~ 130/80 mm H madhavi Valsartan. No  checking regularly. He will have bilateral cataract surgery in July in staged fashion; has had vision loss. He had carpal tunnel release in March -- pathology suggests amyloid.    Patient Active Problem List   Diagnosis   • Abnormal chest xray   • Acquired deformity of chest and rib   • Allergic rhinitis   • Aneurysm of ascending aorta   • Asbestos exposure   • Atypical chest pain   • Benign essential hypertension   • Benign prostatic hyperplasia with incomplete bladder emptying   • Chronic constipation   • Chronic pansinusitis   • ADAMARIS (obstructive sleep apnea)   • Coronary artery calcification seen on CAT scan   • Hypercholesterolemia   • Esophageal reflux   • Fatigue   • Fatty liver   • Gallbladder polyp   • Generalized anxiety disorder   • Homozygous MTHFR mutation [id number]   • Hypothyroidism   • Incidental lung nodule, > 3mm and < 8mm   • Locking of left knee   • Mass of joint of right knee   • Medication side effect   • Mild episode of recurrent major depressive disorder   • Nasal dryness   • Nasal septal deviation   • Overweight   • PAC (premature atrial contraction)   • Palpitations   • Reactive airway disease (HHS-HCC)   • Right knee pain   • Vitamin B12 deficiency   • Vitamin D deficiency   • SOBOE (shortness of breath on exertion)   • Colicky RUQ abdominal pain   • Bleeding hemorrhoids   • Hernia, abdominal   • History of severe acute respiratory syndrome coronavirus 2 (SARS-CoV-2) disease   • History of migraine headaches   • Tubular adenoma of colon   • Dizziness   • Elevated LDL cholesterol level   • Mild persistent asthma without complication (HHS-HCC)   • 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 30 mL 2   • buPROPion XL (Wellbutrin XL) 300 mg 24 hr tablet Take 1 tablet (300 mg) by mouth once daily in the morning. Take 1 tablet daily with food in AM 90 tablet 3   • cholecalciferol (Vitamin D-3) 50 mcg (2,000 unit) capsule Take 1 capsule (50 mcg) by mouth once daily.     • cyanocobalamin (Vitamin B-12) 1,000 mcg tablet Take 1 tablet (1,000 mcg) by mouth once daily.     • diclofenac sodium 1 % kit Apply to right knee 2 grms twice daily     • escitalopram (Lexapro) 5 mg tablet Take 1 tablet (5 mg) by mouth once daily. 90 tablet 1   • esomeprazole (NexIUM) 40 mg DR capsule Take 1 capsule (40 mg) by mouth 2 times a day. Take 1/2 hour before breakfast or lunch,and take at bedtime . Must be  from thyroid med by at least  one hour. 180 capsule 1   • ferrous sulfate 325 (65 Fe) MG tablet Take by mouth. Take 1 tablet daily with food three days per week with supper. Do not take 3 hours of thyroid pill     • fluticasone (Flonase) 50 mcg/actuation nasal spray Administer 2 sprays into each nostril once daily. Shake gently. Before first use, prime pump. After use, clean tip and replace cap. 48 g 3   • fluticasone furoate (Arnuity Ellipta) 100 mcg/actuation inhaler Inhale 1 puff once daily. Rinse mouth with water after use to reduce aftertaste and incidence of candidiasis. Do not swallow. 1 each 11   • glucosamine HCl 750 mg tablet Take 2 tablets by mouth once daily.     • levothyroxine (Synthroid, Levoxyl) 75 mcg tablet Take 1 tablet (75 mcg) by mouth once daily in the morning. Take before meals. Take by itself, on an empty stomach and nothing but water for one hour after. 90 tablet 3   • LORazepam (Ativan) 0.5 mg tablet Take 1 tablet (0.5 mg) by mouth once daily as needed for anxiety for up to 10 days. 1 tab daily if needed for stressful situations 10 tablet 0   • magnesium oxide (Mag-Ox) 250 mg magnesium tablet Take 1 tablet (250 mg) by mouth 2 times a day.  Avoid taking close to iron 180 tablet 2   • multivitamin tablet Take 1 tablet by mouth once daily.     • polyethylene glycol (Glycolax) 17 gram/dose powder Take by mouth once daily. Mix 1 packet in 8 ounces of liquid and drink once daily     • predniSONE (Deltasone) 20 mg tablet 2 tabs daily for 5 days then one tab daily for 2 days then stop. Take in the am or lunch with food. 12 tablet 8   • psyllium (Metamucil) powder Take by mouth. Use as directed     • rosuvastatin (Crestor) 40 mg tablet Take 1 tablet (40 mg) by mouth once daily. 90 tablet 3   • tamsulosin (Flomax) 0.4 mg 24 hr capsule Take 1 capsule (0.4 mg) by mouth 2 times a day. 180 capsule 2   • valsartan (Diovan) 320 mg tablet Take 1 tablet (320 mg) by mouth once daily. 90 tablet 3     No current facility-administered medications for this visit.     Allergies   Allergen Reactions   • Codeine Unknown       Physical Exam  HEENT: Benign. No Rigoberto's syndrome.  Neck: no neck swelling  Breathing non labored  Neuro: Speech normal. He is fully alert and oriented.  Normal affect    Data reviewed:  6.2025 Labs  Cr 0.93  Tchol 178, LDL 94, Trig 101, HDL 65      CTA c/a/p personally reviewed  Ascending aorta slightly larger ~ 4.6 cms maximal diameter  Areas of abdominal atherosclerosis (mild); no AAA    Surgical pathology of carpal tunnel tissue + amyloid  TENOSYNOVIUM, LEFT WRIST, EXCISION:  -    Comment: Focal amyloid deposition is noted and is confirmed with birefringence on a Congo Red stain. Please contact the Pathology Department at [phone] for additional testing including mass spectrometry, if desired.       Prior testing      6.2024 Echo Aorta ~ 4.2 cms, mild AI      1.2024 Stress MPI no ischemia  6.2023 cardiac MRI 4.2 cms, mild AI, like trileaflet valve  4.2023 non contrast CT aorta reported 4.5 cms, but I think ~ 4.4  10.2021 Echo ascending aorta 4.1 cms  1.2014 coronary Ca++ score ascending aorta 3.8 cms  8.2018 stress echo -- ascending aorta ~  3.9-4.0 cms  7.2019 coronary Ca++ score ascending aorta 4.2-4.3 cms  3.2022 CTA chest ascending aorta 4.2-4.3 cms     4.2014 MRI liver, abdominal aorta appears normal in size to above bifurcation.   MRI Cardiac Chest MRA w/wo contrast for Morph/Funct, Valve Dz, and great vessels 01Jun2023 03:44PM Zuleyka Cota   ORDER REVISED TO A TH MRI CARDIAC /CHEST MRA W/WO CONTRAST FOR MORPH/FUNCT, VALVE DZ AND GREAT VESSELS BY RADIOLOGIST; Original Order Number: [id number]       Assessment/Plan:     65 year-old man with longstanding HTN with ascending aortic enlargement dating back to ~ 2014, some slight expansion over time. He has mild AI; trileaflet valve. No high risk features.  No family hx of aortic pathology.    His aorta is larger on 2025 CTA at ascending aortic portion compared to 2023; I will refer for aortopathy panel to exclude high risk genetic mutation (e.g., TGFBeta pathway mutations) that would warrant repair at a size < 5.0 cms.      His Bps are still not optimal; he will track this will, If BP not < 130/80 mm Hg, I will add hydrochlorothiazide 12.5 mg QD to his medical regimen atop valsartan. We discussed how BP control is vital for preventing aortic growth over time.    I will review his surgical pathology results with amyloid experts here at  -- SPEP pending but should we obtain cardiac MRI.    I would love to see his LDL < 70 mg/dL given coronary CA++> 0 and lipid abnormalities, but at least now < 100 mg/dL. He is on intensive statin Rx rosuvastatin 40; could consider ezetimibe 10 mg/day.    RTC 4 months -- genetics evaluation in the interim and ? Need for cardiac MRI for amyloid.    Zuleyka Cota MD

## 2025-06-16 NOTE — PROGRESS NOTES
06/16/25    Cardiovascular Medicine    Virtual or Telephone Consent    An interactive audio and video telecommunication system which permits real time communications between the patient (at the originating site) and provider (at the distant site) was utilized to provide this telehealth service.   Verbal consent was requested and obtained from Livanmalik Wen on this date, 06/16/25 for a telehealth visit and the patient's location was confirmed at the time of the visit.      History of Present Illness  This terrific 65 y.o. year-old man is in f/u of ascending aortic aneurysm with multiple thoracic imaging studies over the years dating back to 2014 (see below) with some increase in TAA dimension. He has a chronic chest pain syndrome with prior stress testing done and has exertional dyspnea. He has had HTN for > 10 years and hyperlipidemia with coronary calcium score > 0 now on rosuvastatin.     No hx of acute coronary syndrome or acute aortic syndrome. No known cardiac murmur. He has no family hx of aortic aneurysms or dissections. He has 4 children in their 20s and 30s.    His grand son is 1 year old today.     Since I saw him last year, he has stable infrequent chest pain atypical for angina or aortic disease (had stress test last year). Bps ~ 130/80 mm H madhavi Valsartan. No checking regularly. He will have bilateral cataract surgery in July in staged fashion; has had vision loss. He had carpal tunnel release in March -- pathology suggests amyloid.    Patient Active Problem List   Diagnosis    Abnormal chest xray    Acquired deformity of chest and rib    Allergic rhinitis    Aneurysm of ascending aorta    Asbestos exposure    Atypical chest pain    Benign essential hypertension    Benign prostatic hyperplasia with incomplete bladder emptying    Chronic constipation    Chronic pansinusitis    ADAMARIS (obstructive sleep apnea)    Coronary artery calcification seen on CAT scan    Hypercholesterolemia    Esophageal reflux     Fatigue    Fatty liver    Gallbladder polyp    Generalized anxiety disorder    Homozygous MTHFR mutation [id number]    Hypothyroidism    Incidental lung nodule, > 3mm and < 8mm    Locking of left knee    Mass of joint of right knee    Medication side effect    Mild episode of recurrent major depressive disorder    Nasal dryness    Nasal septal deviation    Overweight    PAC (premature atrial contraction)    Palpitations    Reactive airway disease (HHS-HCC)    Right knee pain    Vitamin B12 deficiency    Vitamin D deficiency    SOBOE (shortness of breath on exertion)    Colicky RUQ abdominal pain    Bleeding hemorrhoids    Hernia, abdominal    History of severe acute respiratory syndrome coronavirus 2 (SARS-CoV-2) disease    History of migraine headaches    Tubular adenoma of colon    Dizziness    Elevated LDL cholesterol level    Mild persistent asthma without complication (HHS-HCC)    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 30 mL 2    buPROPion XL (Wellbutrin XL) 300 mg 24 hr tablet Take 1 tablet (300 mg) by mouth once daily in the morning. Take 1 tablet daily with food in AM 90 tablet 3    cholecalciferol (Vitamin D-3) 50 mcg (2,000 unit) capsule Take 1 capsule (50 mcg) by mouth once daily.      cyanocobalamin (Vitamin B-12) 1,000 mcg tablet Take 1 tablet (1,000 mcg) by mouth once daily.      diclofenac sodium 1 % kit Apply to right knee 2 grms twice daily      escitalopram (Lexapro) 5 mg tablet Take 1 tablet (5 mg) by mouth once daily. 90 tablet 1    esomeprazole (NexIUM) 40 mg DR capsule Take 1 capsule (40 mg) by mouth 2 times a day. Take 1/2 hour before  breakfast or lunch,and take at bedtime . Must be  from thyroid med by at least  one hour. 180 capsule 1    ferrous sulfate 325 (65 Fe) MG tablet Take by mouth. Take 1 tablet daily with food three days per week with supper. Do not take 3 hours of thyroid pill      fluticasone (Flonase) 50 mcg/actuation nasal spray Administer 2 sprays into each nostril once daily. Shake gently. Before first use, prime pump. After use, clean tip and replace cap. 48 g 3    fluticasone furoate (Arnuity Ellipta) 100 mcg/actuation inhaler Inhale 1 puff once daily. Rinse mouth with water after use to reduce aftertaste and incidence of candidiasis. Do not swallow. 1 each 11    glucosamine HCl 750 mg tablet Take 2 tablets by mouth once daily.      levothyroxine (Synthroid, Levoxyl) 75 mcg tablet Take 1 tablet (75 mcg) by mouth once daily in the morning. Take before meals. Take by itself, on an empty stomach and nothing but water for one hour after. 90 tablet 3    LORazepam (Ativan) 0.5 mg tablet Take 1 tablet (0.5 mg) by mouth once daily as needed for anxiety for up to 10 days. 1 tab daily if needed for stressful situations 10 tablet 0    magnesium oxide (Mag-Ox) 250 mg magnesium tablet Take 1 tablet (250 mg) by mouth 2 times a day. Avoid taking close to iron 180 tablet 2    multivitamin tablet Take 1 tablet by mouth once daily.      polyethylene glycol (Glycolax) 17 gram/dose powder Take by mouth once daily. Mix 1 packet in 8 ounces of liquid and drink once daily      predniSONE (Deltasone) 20 mg tablet 2 tabs daily for 5 days then one tab daily for 2 days then stop. Take in the am or lunch with food. 12 tablet 8    psyllium (Metamucil) powder Take by mouth. Use as directed      rosuvastatin (Crestor) 40 mg tablet Take 1 tablet (40 mg) by mouth once daily. 90 tablet 3    tamsulosin (Flomax) 0.4 mg 24 hr capsule Take 1 capsule (0.4 mg) by mouth 2 times a day. 180 capsule 2    valsartan (Diovan) 320 mg tablet Take 1 tablet (320 mg) by  mouth once daily. 90 tablet 3     No current facility-administered medications for this visit.     Allergies   Allergen Reactions    Codeine Unknown       Physical Exam  HEENT: Benign. No Rigoberto's syndrome.  Neck: no neck swelling  Breathing non labored  Neuro: Speech normal. He is fully alert and oriented.  Normal affect    Data reviewed:  6.2025 Labs  Cr 0.93  Tchol 178, LDL 94, Trig 101, HDL 65      CTA c/a/p personally reviewed  Ascending aorta slightly larger ~ 4.6 cms maximal diameter  Areas of abdominal atherosclerosis (mild); no AAA    Surgical pathology of carpal tunnel tissue + amyloid  TENOSYNOVIUM, LEFT WRIST, EXCISION:  -    Comment: Focal amyloid deposition is noted and is confirmed with birefringence on a [place] Red stain. Please contact the Pathology Department at [phone] for additional testing including mass spectrometry, if desired.       Prior testing      6.2024 Echo Aorta ~ 4.2 cms, mild AI      1.2024 Stress MPI no ischemia  6.2023 cardiac MRI 4.2 cms, mild AI, like trileaflet valve  4.2023 non contrast CT aorta reported 4.5 cms, but I think ~ 4.4  10.2021 Echo ascending aorta 4.1 cms  1.2014 coronary Ca++ score ascending aorta 3.8 cms  8.2018 stress echo -- ascending aorta ~ 3.9-4.0 cms  7.2019 coronary Ca++ score ascending aorta 4.2-4.3 cms  3.2022 CTA chest ascending aorta 4.2-4.3 cms     4.2014 MRI liver, abdominal aorta appears normal in size to above bifurcation.   MRI Cardiac Chest MRA w/wo contrast for Morph/Funct, Valve Dz, and great vessels 01Jun2023 03:44PM Zuleyka Cota   ORDER REVISED TO A TH MRI CARDIAC /CHEST MRA W/WO CONTRAST FOR MORPH/FUNCT, VALVE DZ AND GREAT VESSELS BY RADIOLOGIST; Original Order Number: [id number]       Assessment/Plan:     65 year-old man with longstanding HTN with ascending aortic enlargement dating back to ~ 2014, some slight expansion over time. He has mild AI; trileaflet valve. No high risk features.  No family hx of aortic pathology.    His  aorta is larger on 2025 CTA at ascending aortic portion compared to 2023; I will refer for aortopathy panel to exclude high risk genetic mutation (e.g., TGFBeta pathway mutations) that would warrant repair at a size < 5.0 cms.      His Bps are still not optimal; he will track this will, If BP not < 130/80 mm Hg, I will add hydrochlorothiazide 12.5 mg QD to his medical regimen atop valsartan. We discussed how BP control is vital for preventing aortic growth over time.    I will review his surgical pathology results with amyloid experts here at  -- SPEP pending but should we obtain cardiac MRI.    I would love to see his LDL < 70 mg/dL given coronary CA++> 0 and lipid abnormalities, but at least now < 100 mg/dL. He is on intensive statin Rx rosuvastatin 40; could consider ezetimibe 10 mg/day.    RTC 4 months -- genetics evaluation in the interim and ? Need for cardiac MRI for amyloid.    Zuleyka Cota MD

## 2025-06-16 NOTE — PROGRESS NOTES
Subjective   Patient ID: Livan Wen is a 65 y.o. male who presents for Annual Physical and follow up on conditions.    (LAST VISIT PLAN FROM: 04/14/2025:  Patient Instructions:  Increase albuterol inhaler to 2 inhalations three times per day for your cough.  Continue arnuity once per day in the am.  Prednisone 20mg tabs: take 2 tabs daily once per day for 5 days then one tab daily once per day for 2 days.  Benzonatate swallow one up to three times per day as needed for the cough  Chest xray today.  Continue rosuvastatin 40 mg daily. 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  Blood and urine test today or as soon as off prednisone.  Fasting blood test in 3-4months-this does not need to be before your June appt., no vitamins supplements or thyroid pill that day until after your blood is drawn.  Goal LDL cholesterol is under 60.  Prefer TSH be around 1.0 to 2.0  Time prep 8 min  60 minutes visit.  END INFO FROM PRIOR VISIT)  -------------------------------------------  HPI  Health maintenance items last completed:  Colonoscopy: Colonoscopy in 2016 showed a tubular adenoma.  In 2019, colonoscopy showed a nodule of benign tissue.  Repeat colonoscopy in 5 years. Last colonoscopy was 10/30/2019 due 10/30/2024.    Pathology:    PSA: Last PSA was 0.37 on 06/11/2025.  Urine ACR (albumin-creatinine ratio) or microalbumin: if HTN or DM2: Albumin in urine was 14.3 on 12/06/2023.  Vaccines due or not completed: UTD except no Covid vaccine on record.  Last Health Maintenance exam: 06/19/2024.    Patient reports having regular eye and dental examinations.    Daniel Maintenance:  We discussed recommended vaccines including Influenza vaccine in the Fall.  Patient declines Covid vaccines.  Rest of vaccines UTD. Colonoscopy is due.    Upcoming Cataract Surgery:  Patient reports upcoming cataract surgery.    Reactive Airway Disease:  Asthma:   No  complaints.  Use of rescue inhaler: No  Medications : Taking them regularly , no side effects.  Patient continues on Arnuity Ellipta, 1 inhalation daily. He will take his albuterol first prior to taking his Arnuity Ellipta.    No SOB, cough, sputum, wheezing. No SOB on exertion.    Tremor:  Patient followed with Dr. Banks, Neurology, on 06/17/2025.  I reviewed the note as follows:  Assessment/Plan  Problem List Items Addressed This Visit    None  His tremor is likely multifactorial.  We talked about reducing caffeine as well as possibly tapering off Wellbutrin and watching tremor.  Wellbutrin is known to cause tremors.   Will ask PCP if this is an option to wean off.  ( After 2-3 weeks would notice if tremor is effect)    It is also possible he could have essential tremor which we discussed today.  Alcohol may be contributing.     I will send a note to his PCP about weaning off Wellbutrin and giving that at least a month to see what his tremors like.  Also alcohol may be contributing as well.    We could trial propranolol if the above is not helping and tremor is bothersome.   Electronically signed by Maureen Banks MD at 6/17/2025  9:10 AM    History of Left Middle Trigger Finger:  Carpal Tunnel Syndrome.  Patient followed with Orthopedic Surgery, Dr. Tonja Magaña on 03/12/2025.  I reviewed the note as follows: Patient underwent :        Left Thumb Pain:  Patient reports having left thumb pain rated 4/10 at the 1st MCP of left thumb, right lateral epicondyle pain, and 3rd digit MCP on the right.  He also has a trigger finger on the 3rd digit of the right hand with swelling and patient states that he had a trigger finger on the 1st MCP of the right hand but is no longer able to get it to move/lock any longer.  Swelling noted on all.  Denies popping out of joints but states his hands are very stiff in the mornings and takes a while to get things moving.  Of note, patient reports doing a lot of gardening  lately.      Recommended wearing a soft brace on his left hand as well as utilizing contrast water baths, and good  gloves and tools, such as [employer] .  Information provided to patient.    Of note, CRP recently was normal.  Denies rash anywhere.  Advised not to take Aleve or ibuprofen.    Intention Tremor:  Patient reports tremor with fine motor skills such as putting a nut on a bolt.  Patient saw Neurology, Dr. Banks, on 06/17/2025.  I reviewed the note as follows;  Assessment/Plan  Problem List Items Addressed This Visit    None  His tremor is likely multifactorial.  We talked about reducing caffeine as well as possibly tapering off Wellbutrin and watching tremor.  Wellbutrin is known to cause tremors.   Will ask PCP if this is an option to wean off.  ( After 2-3 weeks would notice if tremor is effect)    It is also possible he could have essential tremor which we discussed today.  Alcohol may be contributing.     I will send a note to his PCP about weaning off Wellbutrin and giving that at least a month to see what his tremors like.  Also alcohol may be contributing as well.    We could trial propranolol if the above is not helping and tremor is bothersome.    We discussed weaning off bupropion over the course of a month, starting in 2 weeks (July 15) changing bupropion XL to 150 mg once per day for 30 days. We discussed if tremor is gone at this dose, we can continue the 150 mg dose, and he has one refill. If tremor still present August 15, stop the bupropion and see if it is gone in 3 weeks. If tremor is not affected by this change, bupropion is not the cause. Pay attention to what tremor is like before making the change.    Aortic Aneurysm:  Patient has an aortic aneurysm, 4.2 cm, and follows with Dr. Cota, last seen 06/16/2025.  I reviewed the note as follows:  Assessment/Plan:  65 year-old man with longstanding HTN with ascending aortic enlargement dating back to ~ 2014, some slight expansion  over time. He has mild AI; trileaflet valve. No high risk features.  No family hx of aortic pathology.  His aorta is larger on 2025 CTA at ascending aortic portion compared to 2023; I will refer for aortopathy panel to exclude high risk genetic mutation (e.g., TGFBeta pathway mutations) that would warrant repair at a size < 5.0 cms.    His Bps are still not optimal; he will track this will, If BP not < 130/80 mm Hg, I will add hydrochlorothiazide 12.5 mg QD to his medical regimen atop valsartan. We discussed how BP control is vital for preventing aortic growth over time.  I will review his surgical pathology results with amyloid experts here at  -- SPEP pending but should we obtain cardiac MRI.  I would love to see his LDL < 70 mg/dL given coronary CA++> 0 and lipid abnormalities, but at least now < 100 mg/dL. He is on intensive statin Rx rosuvastatin 40; could consider ezetimibe 10 mg/day.  RTC 4 months -- genetics evaluation in the interim and ? Need for cardiac MRI for amyloid.  Zuleyka Cota MD    We discussed the recommendations.  I agree.  Patient will go for genetic evaluation and undergo MRI for amyloid.       Hypertension:  BP today is 115/72.  Follow up on cardiovascular conditions:  Patient continues on valsartan 320 mg daily.  Cardiac:   Chest pain?No  Palpitations? No  Increased abebe?  No  Other:  Resp:   Shortness of breath?No  Wheezing No  Cough No  Other:  Extremities:   Leg or ankle swelling?No   Claudication?No  Other:  Neuro:  DizzinessNo  SyncopeNo   Blurred visionNo  New headaches No  Other:  Medications:Taking them regularly.Yes  Side effects.No    We discussed his latest blood work results on 06/11/2025.    Hypercholesterolemia:  No myalgias, nausea, abdominal pain.  Meds: Taking regularly, no adverse effect.  Patient continues on Crestor 40 mg daily.  Labs: Last LDL direct was 102 mg/dL, cholesterol 178 mg/dL, and triglycerides at 101 mg/dL on 06/11/2025.    LDL goal: < 60  mg/dL.  Patient had mild atherosclerotic calcification of the coronary arteries on CT Chest 03/23/2022. ASCVD risk 9.8%.  Lab Results   Component Value Date    CHOL 178 06/11/2025    CHOL 168 04/11/2025    CHOL 225 (H) 12/26/2024     Lab Results   Component Value Date    HDL 65 06/11/2025    HDL 70 04/11/2025    HDL 65.4 12/26/2024     Lab Results   Component Value Date    LDLCALC 94 06/11/2025    LDLCALC 79 04/11/2025    LDLCALC 115 (H) 12/26/2024     Lab Results   Component Value Date    TRIG 101 06/11/2025    TRIG 104 04/11/2025    TRIG 224 (H) 12/26/2024       We discussed adding Zetia 10 mg daily.  Patient is in agreement.  We discussed starting Zetia and waiting a couple of weeks before we start weaning off of the bupropion.  Order placed.    Asthma: doing well on daily arnuity and with current seaason is using his nasal spray azelastine, needs refill and his prn albuterol, no inc sob or wheezing.  BPH:  Patient continues on Flomax 0.4 mg X2 daily. No concerns reported today.     GERD:  Patient continues on Nexium 40 mg X2 daily.  Patient does report recurring dysphagia.  He has known history of hiatal hernia.  Last EGD was 11/06/2023 showing:    Recommended contacting GI, Dr. Mcfarlane and making an appointment for follow up on the recurring dysphagia.    MDD:  LUAN:  Patient continues on Wellbutrin  mg daily, Lexapro 5 mg daily, and Ativan 0.5 mg PRN anxiety/stress.  Patient reports being under stress with caring for his mother-in-law due to health issues as of late.  It was recommended by Neurology to start weaning off the bupropion to see if tremor improves/resolves.  Will start weaning off in 2 weeks after starting Zetia.  Patient reports that he has had to take Ativan, intermittently.    Hypothyroidism:  Patient continues on levothyroxine 75 mcg daily.  Last TSH was 2.43, WNL on 06/11/2025.  Prior TSH 4.15 on 04/11/2025. Returned to normal limits. Continue same dose.    Orders placed for prescriptions  as required.    Orders placed for fasting blood work and UA as required.    Follow up 09/10/2025.    Review of Systems  All systems reviewed and are negative unless otherwise stated in HPI or noted in writing on the written   3  page history and review of systems document reviewed by me and  scanned in with this visit. This is scanned either to notes or to media, with today's date.    Current Outpatient Medications   Medication Instructions    albuterol 90 mcg/actuation inhaler Inhale 1-2 puffs every 4-6 hours as needed    azelastine (Astelin) 137 mcg (0.1 %) nasal spray 2 sprays, Each Nostril, 2 times daily PRN    buPROPion XL (WELLBUTRIN XL) 150 mg, oral, Every morning, Do not crush, chew, or split.    cyanocobalamin (Vitamin B-12) 1,000 mcg tablet 1 tablet, Daily    diclofenac sodium 1 % kit Apply to right knee 2 grms twice daily    escitalopram (LEXAPRO) 5 mg, oral, Daily    esomeprazole (NEXIUM) 40 mg, oral, 2 times daily, Take 1/2 hour before breakfast or lunch,and take at bedtime . Must be  from thyroid med by at least  one hour.    ezetimibe (ZETIA) 10 mg, oral, Daily    ferrous sulfate 325 (65 Fe) MG tablet Take by mouth. Take 1 tablet daily with food three days per week with supper. Do not take 3 hours of thyroid pill    fluticasone (Flonase) 50 mcg/actuation nasal spray 2 sprays, Each Nostril, Daily, Shake gently. Before first use, prime pump. After use, clean tip and replace cap.    fluticasone furoate (Arnuity Ellipta) 100 mcg/actuation inhaler 1 puff, inhalation, Daily, Rinse mouth with water after use to reduce aftertaste and incidence of candidiasis. Do not swallow.    glucosamine HCl 750 mg tablet 2 tablets, Daily    levothyroxine (SYNTHROID, LEVOXYL) 75 mcg, oral, Daily before breakfast, Take by itself, on an empty stomach and nothing but water for one hour after.    LORazepam (ATIVAN) 0.5 mg, oral, Daily PRN, 1 tab daily if needed for stressful situations    magnesium oxide (MAG-OX) 250  mg, oral, 2 times daily, Avoid taking close to iron    meloxicam (MOBIC) 7.5 mg, oral, Daily, With food.    multivitamin tablet 1 tablet, Daily    polyethylene glycol (Glycolax) 17 gram/dose powder Daily RT    psyllium (Metamucil) powder Take by mouth. Use as directed    rosuvastatin (CRESTOR) 40 mg, oral, Daily    tamsulosin (FLOMAX) 0.4 mg, oral, 2 times daily    valsartan (DIOVAN) 320 mg, oral, Daily     RX Allergies[1]  Objective   The following test results have been reviewed:  Latest Complete Lab Results:  CBC w/Diff:   Lab Results   Component Value Date    WBC 6.6 06/11/2025    NRBC 0.0 12/06/2023    RBC 4.24 06/11/2025    HGB 13.7 06/11/2025    HCT 41.1 06/11/2025    MCV 96.9 06/11/2025    MCHC 33.3 06/11/2025     06/11/2025    RDW 12.8 06/11/2025    NEUTOPHILPCT 69.3 12/06/2023    IGPCT 0.1 12/06/2023    LYMPHOPCT 13.3 06/11/2025    MONOPCT 8.6 06/11/2025    EOSPCT 3.8 06/11/2025    BASOPCT 0.8 06/11/2025    NEUTROABS 4.73 12/06/2023    LYMPHSABS 1.09 (L) 12/06/2023    MONOSABS 0.68 12/06/2023    EOSABS 251 06/11/2025    BASOSABS 53 06/11/2025        CMP:  Lab Results   Component Value Date    GLUCOSE 88 06/11/2025     06/11/2025    K 4.4 06/11/2025     06/11/2025    CO2 27 06/11/2025    ANIONGAP 11 06/11/2025    BUN 11 06/11/2025    CREATININE 0.93 06/11/2025    CALCIUM 9.8 06/11/2025    ALBUMIN 4.5 12/06/2023    ALKPHOS 97 12/06/2023    PROT 7.0 06/11/2025    AST 20 04/11/2025    BILITOT 0.7 12/06/2023    ALT 20 04/11/2025        Lipid:   Lab Results   Component Value Date    CHOL 178 06/11/2025    HDL 65 06/11/2025    CHHDL 2.7 06/11/2025    LDLF 131 (H) 03/01/2023    VLDL 45 (H) 12/26/2024    TRIG 101 06/11/2025       LDL Direct:  Lab Results   Component Value Date    LDLDIRECT 102 (H) 06/11/2025        TSH:   Lab Results   Component Value Date    TSH 2.43 06/11/2025        B12:  Lab Results   Component Value Date    JQYASMTX32 768 06/11/2025       Vitamin D:  Lab Results   Component  "Value Date    VITD25 73 06/11/2025        HgA1c:   No results found for: \"HGBA1C\", \"NIJPZZQV0O\"    PSA:   Lab Results   Component Value Date    PSA 0.37 06/11/2025       Latest Reference Range & Units 06/11/25 12:33   Total Protein 6.4 - 8.2 g/dL 7.0   Protein Electrophoresis Comment  Normal.   Albumin 3.4 - 5.0 g/dL 4.2   Alpha 1 Globulin 0.2 - 0.6 g/dL 0.4   Alpha 2 Globulin 0.4 - 1.1 g/dL 0.8   Gamma 0.5 - 1.4 g/dL 0.8   Beta Globulin 0.5 - 1.2 g/dL 0.8   Ig Kappa Free Light Chain 0.33 - 1.94 mg/dL 1.85   Ig Lambda Free Light Chain 0.57 - 2.63 mg/dL 1.37   Kappa/Lambda Ratio 0.26 - 1.65  1.35   Immunofixation Comment  No monoclonal protein detected by immunofixation.   Path Review - Serum Immunofixation  Reviewed and approved by PARTH GÓMEZ on 6/16/25 at 10:17 AM.       Path Review - Serum Protein Electrophoresis   Reviewed and approved by PARTH GÓMEZ on 6/16/25 at 10:17 AM.             Physical Exam      9/9/2024     3:16 PM 9/10/2024     1:58 PM 12/16/2024     8:28 AM 3/11/2025     8:07 AM 4/14/2025     7:56 AM 6/17/2025     8:41 AM 6/24/2025     8:23 AM   Vitals   Systolic 104 129 108 128 114 146 115   Diastolic 64 78 78 90 70 105 72   BP Location    Right arm  Right arm    Heart Rate 64 82 68 75 64 89 82   Temp  36.6 °C (97.9 °F)  36.4 °C (97.5 °F) 37 °C (98.6 °F) 36.4 °C (97.5 °F)    Resp 18  18 12 18 14 18   Height 1.854 m (6' 1\")  1.854 m (6' 1\") 1.854 m (6' 1\") 1.829 m (6') 1.854 m (6' 1\") 1.829 m (6')   Weight (lb) 236  240 251 243 245 240   BMI 31.14 kg/m2  31.66 kg/m2 33.12 kg/m2 32.96 kg/m2 32.32 kg/m2 32.55 kg/m2   BSA (m2) 2.35 m2  2.37 m2 2.42 m2 2.36 m2 2.39 m2 2.35 m2   Visit Report Report Report Report Report Report Report Report     General: Patient is known to me, looks well, is in usual state of health, with  no obvious distress.  Head: Normocephalic  Eyes: PERRL, EOMI, no scleral icterus or conjunctival abnormality  Ears: Normal canals and TM's  Oropharynx: Well hydrated " mucosa. no lesions, erythema. palate moves well.  Neck: No masses, no cervical (A/P/ or Lateral) adenopathy. Thyroid not enlarged and no nodules. Carotid pulses normal and no bruits.  Chest: Normal AP diameter. No supraclavicular adenopathy.  Lungs: Clear to auscultation: no rales , wheezes, rhonchi, rubs, examined bilaterally, ant/post /lateral. RR normal.  Heart: RRR. No MGCR S1 S2 normal.  Abd: BS normal, no bruits. No hepatosplenomegaly. No abdominal mass or tenderness. No distension.  Extremities: No upper extremity edema. No lower leg edema. No ischemia.   Joints: No synovitis seen. No deformities.  1st MCP of the left thumb, right lateral epicondyle, and 3rd digit  MCP of middle finger on the right with mild swelling.  Right olecranon has prominent skin greater than left but no bursitis noted today.  He also has a trigger finger on the 3rd digit (middle finger) of the right hand.  Order placed for meloxicam 7.5 mg daily for 2 weeks, wearing a soft brace, rest, contrast water baths, and good  gloves.  Order placed for XR bilateral hand.  Pulses: Normal posterior tibialis pulses, normal dorsalis pedis pulses. normal radial pulses. Normal femoral pulses without bruits.  Neuro: CN 2-12 intact . Alert, oriented, appropriate.  No focal weakness observed. No tremors. Ambulation is normal .  Psych: Mood and affect normal. No cognitive deficits noted during this exam. Alert, oriented, appropriate.  Skin: No rash, petechiae or excessive bruising.  Lymph: No SC axillary or inguinal adenopathy    Livan was seen today for annual exam.  Diagnoses and all orders for this visit:  Encounter for annual physical exam (Primary)  Benign essential hypertension  -     Follow Up In [facility] - PCP; Future  Hypercholesterolemia  -     Follow Up In [facility] - PCP; Future  -     Follow Up In [facility] - PCP; Future  -     Follow Up In [facility] - PCP; Future  -     ezetimibe (Zetia)  10 mg tablet; Take 1 tablet (10 mg) by mouth once daily.  -     Lipid Panel; Future  -     Aspartate Aminotransferase; Future  -     Alanine Aminotransferase; Future  -     Creatine Kinase; Future  -     Follow Up In [facility] - PCP  Hypothyroidism, unspecified type  -     Follow Up In [facility] - PCP; Future  -     Follow Up In [facility] - PCP; Future  -     Follow Up In [facility] - PCP; Future  -     Follow Up In [facility] - PCP  Gastroesophageal reflux disease, unspecified whether esophagitis present  Benign prostatic hyperplasia with incomplete bladder emptying  -     Follow Up In [facility] - PCP; Future  -     Follow Up In [facility] - PCP; Future  -     Follow Up In [facility] - PCP  Generalized anxiety disorder  -     Follow Up In [facility] - PCP; Future  -     Follow Up In [facility] - PCP; Future  -     Follow Up In [facility] - PCP  Mild episode of recurrent major depressive disorder  -     Follow Up In [facility] - PCP; Future  -     buPROPion XL (Wellbutrin XL) 150 mg 24 hr tablet; Take 1 tablet (150 mg) by mouth once daily in the morning. Do not crush, chew, or split.  -     Follow Up In [facility] - PCP  Mild persistent asthma without complication ([facility])  Aneurysm of ascending aorta without rupture  Intention tremor  -     buPROPion XL (Wellbutrin XL) 150 mg 24 hr tablet; Take 1 tablet (150 mg) by mouth once daily in the morning. Do not crush, chew, or split.  Obstructive sleep apnea  -     azelastine (Astelin) 137 mcg (0.1 %) nasal spray; Administer 2 sprays into each nostril 2 times a day as needed for rhinitis.  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 With food.  Morning joint stiffness of hand, unspecified laterality  -     Sedimentation Rate; Future  -     DIONICIO; Future  -     Uric acid; Future  -     Rheumatoid factor; Future  -     meloxicam (Mobic) 7.5 mg tablet; Take 1 tablet (7.5 mg) by mouth once daily. With food.  Swelling of finger joint of left hand  -     Sedimentation Rate; Future  -     DIONICIO; Future  -     Uric acid; Future  -     Rheumatoid factor; Future  -     meloxicam (Mobic) 7.5 mg tablet; Take 1 tablet (7.5 mg) by mouth once daily. With food.  Medication monitoring encounter  -     Lipid Panel; Future  -     Aspartate Aminotransferase; Future  -     Alanine Aminotransferase; Future  -     Creatine Kinase; Future  -     Cholesterol, LDL Direct; Future  Amyloidosis, unspecified type (Multi)  Comments:  on tendon hand biopsy    Annual  history and physical completed including ROS, PE, review of chronic issues, immunizations,   results of testing and health maintenance.  Functionality and pain were assessed.   Cancer screening testing was addressed as appropriate-see patient discussion/orders.   Medications were discussed and reviewed/reconciled and refill need assessed/handled.   Discussion:  Doubt ctd but will vk labs. Add zetia to get ldl to goal, encouraged dietary compliance.  Djd hands various places, avoid chronic nsaids with ugi issues but short term trial, then consider local steroid injection if that is not working    Tremor will trial weaning bupropion, his mood is doing ok right now. If it worsens ccan up the lexapro if needed but he had some minor adverse effects with higher doses of that medication. Instructions in weaning reviewed.and written.    Amyloidosis, initial testing spep etc  neg, dr vargas indicated she would be speaking to amyloid experts to see if mri heart needed, also she sent him for genetic testing due to increasing aneurysm size. Reiterated caution of lifting to him.    See patient instructions in wrap up plan, orders and comments for treatment plan.  Patient Instructions:  Start Zetia 10 mg once per day -adding this to Rosuvastatin with goal of LDL cholesterol being under 70.  Fasting blood test to check on effectiveness of this and your excellent low cholesterol diet, at least 2 days before your sept 10 appt.    Will also check a couple of arthritis tests around that time. One arthritis done recently, c reactive protein, was normal.    Urine test at the lab preferably sooner than September, see printed orders at the desk.  Hands  Do not take aleve or ibuprofen over the counter.  Good  gloves and tools, such as [employer]o good .  Contrast water baths. Consider soft brace left hand.    Meloxicam 7.5 mg one tab daily with food for 15 days then stop. If it helps the hands but swelling returns can restart and finish the prescription.    Tremor: July 15 Change bupropion XL to 150 mg once per day for 30 days. If tremor is gone at this dose, we can continue the 150 mg dose, you have one refill. If tremor still present August 15, stop the bupropion and see if it is gone in 3 weeks. If tremor is not affected by this change, bupropion is not the cause. Pay attention to what tremor is like before making the change.    I am the Internal Medicine physician providing ongoing chronic medical care for this patient, which is managed during and in between office visits.    Scribe Attestation  By signing my name below, IInessa Scribe   attest that this documentation has been prepared under the direction and in the presence of Sonia Nguyễn MD.     72 minutes          [1]   Allergies  Allergen Reactions    Codeine Unknown

## 2025-06-17 ENCOUNTER — APPOINTMENT (OUTPATIENT)
Dept: RADIOLOGY | Facility: CLINIC | Age: 65
End: 2025-06-17
Payer: COMMERCIAL

## 2025-06-17 ENCOUNTER — APPOINTMENT (OUTPATIENT)
Dept: NEUROLOGY | Facility: CLINIC | Age: 65
End: 2025-06-17
Payer: COMMERCIAL

## 2025-06-17 VITALS
RESPIRATION RATE: 14 BRPM | SYSTOLIC BLOOD PRESSURE: 146 MMHG | WEIGHT: 245 LBS | BODY MASS INDEX: 32.47 KG/M2 | DIASTOLIC BLOOD PRESSURE: 105 MMHG | HEART RATE: 89 BPM | TEMPERATURE: 97.5 F | HEIGHT: 73 IN

## 2025-06-17 DIAGNOSIS — R25.1 TREMOR: Primary | ICD-10-CM

## 2025-06-17 PROCEDURE — 3080F DIAST BP >= 90 MM HG: CPT | Performed by: PSYCHIATRY & NEUROLOGY

## 2025-06-17 PROCEDURE — 1125F AMNT PAIN NOTED PAIN PRSNT: CPT | Performed by: PSYCHIATRY & NEUROLOGY

## 2025-06-17 PROCEDURE — 1036F TOBACCO NON-USER: CPT | Performed by: PSYCHIATRY & NEUROLOGY

## 2025-06-17 PROCEDURE — 1159F MED LIST DOCD IN RCRD: CPT | Performed by: PSYCHIATRY & NEUROLOGY

## 2025-06-17 PROCEDURE — 3008F BODY MASS INDEX DOCD: CPT | Performed by: PSYCHIATRY & NEUROLOGY

## 2025-06-17 PROCEDURE — 3077F SYST BP >= 140 MM HG: CPT | Performed by: PSYCHIATRY & NEUROLOGY

## 2025-06-17 PROCEDURE — G8433 SCR FOR DEP NOT CPT DOC RSN: HCPCS | Performed by: PSYCHIATRY & NEUROLOGY

## 2025-06-17 PROCEDURE — 99214 OFFICE O/P EST MOD 30 MIN: CPT | Performed by: PSYCHIATRY & NEUROLOGY

## 2025-06-17 PROCEDURE — 1160F RVW MEDS BY RX/DR IN RCRD: CPT | Performed by: PSYCHIATRY & NEUROLOGY

## 2025-06-17 ASSESSMENT — ENCOUNTER SYMPTOMS
DEPRESSION: 0
OCCASIONAL FEELINGS OF UNSTEADINESS: 0
LOSS OF SENSATION IN FEET: 0

## 2025-06-17 ASSESSMENT — PAIN SCALES - GENERAL: PAINLEVEL_OUTOF10: 1

## 2025-06-17 NOTE — PROGRESS NOTES
Subjective      Livan Srinivas Wen is a 65 y.o. year old RH male is here for follow up tremor.     HPI  Tremors began in 2020 in b/l hands.  He would notice the tremor during mostly fine motor tasks.   There are times that the tremor will come out when he is using a fork or spoon , writing/ drawing may be affected.  Not every day.      Caffeine use: 3 cups coffee daily  Alcohol use: 12 alcoholic beverages / week.   Family history: 6 siblings.  Father had PD/tremors at age 68 and 2 siblings have MS.      He retired as  and now is working a farmer full time.  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  He was making a onesie, was trying to draw a sketch on a fabric and his tremor was acting out.  He notices when putting on a screw/ notch his tremor will act up.     On Wellbutrin for 10 years.   Had history of migraines- was on Topamax and became depressed after Topamax.  Had migraine surgery at age 50 and has not had migraine since then.     Review of Systems    Patient Active Problem List   Diagnosis    Abnormal chest xray    Acquired deformity of chest and rib    Allergic rhinitis    Aneurysm of ascending aorta    Asbestos exposure    Atypical chest pain    Benign essential hypertension    Benign prostatic hyperplasia with incomplete bladder emptying    Chronic constipation    Chronic pansinusitis    ADAMARIS (obstructive sleep apnea)    Coronary artery calcification seen on CAT scan    Hypercholesterolemia    Esophageal reflux    Fatigue    Fatty liver    Gallbladder polyp    Generalized anxiety disorder    Homozygous MTHFR mutation [id number]    Hypothyroidism    Incidental lung nodule, > 3mm and < 8mm    Locking of left knee    Mass of joint of right knee    Medication side effect    Mild episode of recurrent major depressive disorder    Nasal dryness    Nasal septal deviation    Overweight    PAC (premature atrial contraction)    Palpitations    Reactive airway disease (HHS-HCC)    Right knee pain    Vitamin B12  deficiency    Vitamin D deficiency    SOBOE (shortness of breath on exertion)    Colicky RUQ abdominal pain    Bleeding hemorrhoids    Hernia, abdominal    History of severe acute respiratory syndrome coronavirus 2 (SARS-CoV-2) disease    History of migraine headaches    Tubular adenoma of colon    Dizziness    Elevated LDL cholesterol level    Mild persistent asthma without complication (HHS-HCC)    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)    Gastroesophageal reflux disease    Steatosis of liver     Past Medical History:   Diagnosis Date    Abnormal findings on diagnostic imaging of liver and biliary tract 05/08/2014    Abnormal ultrasound of liver    Abnormal levels of other serum enzymes 07/07/2020    Alkaline phosphatase elevation    Abnormal levels of other serum enzymes 02/14/2019    Elevated alkaline phosphatase level    Acute maxillary sinusitis, unspecified 01/20/2016    Acute maxillary sinusitis    Acute serous otitis media, unspecified ear 10/17/2014    Acute serous otitis media    Acute sinusitis, unspecified 08/20/2012    Acute inflammation of sinus    Acute stress reaction 12/28/2020    Stress reaction    Acute upper respiratory infection, unspecified 10/13/2015    Acute URI    Alcohol dependence, in remission 02/05/2020    History of alcohol dependence    Alcohol dependence, in remission 02/05/2020    History of alcohol dependence    Alcohol dependence, uncomplicated (Multi) 12/10/2018    Alcohol dependence, episodic    Anxiety disorder, unspecified 09/03/2019    Acute anxiety    Benign neoplasm of colon, unspecified 08/07/2016    Tubular adenoma of colon    Cough, unspecified 10/13/2015    Cough    Dysuria 08/20/2012    Dysuria    Elevated blood-pressure reading, without diagnosis of hypertension 03/30/2019    Borderline hypertension    Elevation of levels of liver transaminase levels 07/07/2020    Elevated transaminase level    Encounter for antibody response examination 08/10/2021     Immunity status testing    Encounter for immunization 08/14/2021    Need for prophylactic vaccination against hepatitis A and hepatitis B    Encounter for screening for other viral diseases 08/10/2021    Need for hepatitis C screening test    Epistaxis 03/20/2019    Epistaxis, recurrent    Esophageal dysphagia 06/19/2024    Esophageal obstruction 08/02/2016    Esophageal stricture    Flatulence 01/20/2016    Flatulence symptom    Generalized contraction of visual field, unspecified eye 12/10/2018    Tunnel vision, unspecified laterality    Headache, unspecified 12/10/2018    Headache, temporal    Hemoptysis 06/02/2022    Cough with hemoptysis    Hemorrhage of anus and rectum 12/15/2014    Rectal bleeding    Iliotibial band syndrome, right leg 10/15/2020    Iliotibial band syndrome of both sides    Migraine, unspecified, not intractable, without status migrainosus 04/23/2016    Migraine headache    Olecranon bursitis, right elbow 02/21/2017    Olecranon bursitis of right elbow    Otalgia, right ear 10/17/2014    Otalgia of right ear    Other bursitis, not elsewhere classified, unspecified site 11/08/2016    Traumatic bursitis    Other chest pain 11/17/2015    Chest heaviness    Other conditions influencing health status 06/24/2013    (Lower) Leg Localized Swelling Unilateral    Other conditions influencing health status 08/25/2013    Rib Periostitis    Other long term (current) drug therapy 05/04/2021    Medication dose changed    Other long term (current) drug therapy 02/01/2021    Medication course changed    Other long term (current) drug therapy 07/07/2020    Medication dose changed    Other long term (current) drug therapy 08/14/2021    New medication added    Other specified abnormal findings of blood chemistry     Abnormal liver function test    Other specified disorders of muscle 10/15/2020    Hamstring tightness    Other specified disorders of nose and nasal sinuses 01/14/2021    Nasal crusting    Other  specified disorders of nose and nasal sinuses 09/24/2015    Sinus pressure    Other specified disorders of nose and nasal sinuses 01/10/2019    Nasal dryness    Other specified problems related to primary support group 07/07/2020    Stress due to family tension    Otitis media, unspecified, left ear 01/20/2016    Otitis media, left    Pain in right foot 06/29/2017    Foot arch pain, right    Pain in unspecified wrist 05/11/2012    Pain, wrist joint    Personal history of colonic polyps     History of colonic polyps    Personal history of COVID-19 01/27/2021    History of severe acute respiratory syndrome coronavirus 2 (SARS-CoV-2) disease    Personal history of diseases of the blood and blood-forming organs and certain disorders involving the immune mechanism 12/06/2013    History of leukocytosis    Personal history of diseases of the blood and blood-forming organs and certain disorders involving the immune mechanism 08/14/2021    History of anemia    Personal history of diseases of the blood and blood-forming organs and certain disorders involving the immune mechanism 07/07/2020    History of anemia    Personal history of diseases of the blood and blood-forming organs and certain disorders involving the immune mechanism 04/16/2020    History of iron deficiency anemia    Personal history of other (healed) physical injury and trauma     History of head injury    Personal history of other diseases of male genital organs 02/01/2021    History of epididymitis    Personal history of other diseases of male genital organs     History of prostatitis    Personal history of other diseases of the circulatory system 07/07/2020    History of hypotension    Personal history of other diseases of the circulatory system 09/04/2018    History of malignant hypertension    Personal history of other diseases of the digestive system     History of hemorrhoids    Personal history of other diseases of the digestive system     History of  hiatal hernia    Personal history of other diseases of the digestive system     History of appendicitis    Personal history of other diseases of the nervous system and sense organs 10/12/2017    History of obstructive sleep apnea    Personal history of other diseases of the nervous system and sense organs 10/22/2017    History of acute otitis media    Personal history of other diseases of the respiratory system 12/21/2018    History of acute sinusitis    Personal history of other diseases of the respiratory system 03/07/2018    History of acute sinusitis    Personal history of other diseases of the respiratory system 06/30/2015    History of acute sinusitis    Personal history of other diseases of the respiratory system 03/01/2022    History of acute bronchitis    Personal history of other diseases of the respiratory system 12/10/2018    History of paranasal sinus congestion    Personal history of other diseases of the respiratory system 10/13/2015    History of acute bronchitis    Personal history of other diseases of the respiratory system 11/08/2016    History of acute sinusitis    Personal history of other diseases of the respiratory system     History of pleurisy    Personal history of other diseases of the respiratory system 01/30/2017    History of acute sinusitis    Personal history of other mental and behavioral disorders 12/30/2018    History of nightmares    Personal history of other specified conditions 02/01/2021    History of epistaxis    Personal history of other specified conditions 11/30/2022    History of wheezing    Personal history of other specified conditions 08/20/2012    History of fever    Personal history of other specified conditions 02/01/2021    History of dysphagia    Personal history of other specified conditions 02/01/2021    History of dysphagia    Personal history of pneumonia (recurrent)     History of pneumonia    Personal history of urinary (tract) infections     History of  urinary tract infection    Radiculopathy, cervical region     Cervical radiculopathy    Retention of urine, unspecified 08/20/2012    Urinary retention    Right upper quadrant abdominal tenderness 05/11/2012    Abdominal tenderness, right upper quadrant    Right upper quadrant pain 05/04/2021    Colicky RUQ abdominal pain    Spermatocele of epididymis, unspecified     Spermatocele    Strain of muscle, fascia and tendon at neck level, initial encounter 06/30/2015    Cervical strain    Unspecified abdominal pain 05/04/2021    Intermittent abdominal pain    Unspecified abdominal pain 05/04/2021    Intermittent abdominal pain    Unspecified hemorrhoids 01/27/2020    Bleeding hemorrhoids    Unspecified hemorrhoids     Bleeding hemorrhoids    Unspecified internal derangement of right knee 11/08/2016    Knee locking, right    Unspecified nonsuppurative otitis media, left ear     Allergic otitis media of left ear, unspecified chronicity    Uvular swelling 02/16/2023     Past Surgical History:   Procedure Laterality Date    APPENDECTOMY  08/20/2012    Appendectomy    COLONOSCOPY  04/21/2012    Colonoscopy (Fiberoptic)    ESOPHAGOGASTRODUODENOSCOPY  04/21/2012    Diagnostic Esophagogastroduodenoscopy    EYE SURGERY  07/16/2015    Eye Surgery    KNEE ARTHROSCOPY W/ DEBRIDEMENT  07/16/2015    Arthroscopy Knee    NASAL SEPTUM SURGERY  07/16/2015    Nasal Septal Deviation Repair    OTHER SURGICAL HISTORY  08/02/2016    Esophageal Dilation    OTHER SURGICAL HISTORY  11/18/2012    Surgery Prostate Transurethral Destruction Prostate Tissue    VASECTOMY  04/21/2012    Surgery Vas Deferens Vasectomy     Social History     Tobacco Use    Smoking status: Never     Passive exposure: Never    Smokeless tobacco: Former     Types: Chew     Quit date: 1/1/1986   Substance Use Topics    Alcohol use: Yes     Alcohol/week: 12.0 standard drinks of alcohol     Types: 12 Cans of beer per week     family history includes Alzheimer's disease in his  paternal grandfather and paternal grandmother; Brain cancer (age of onset: 64) in his sister; Multiple sclerosis in his brother and sister; Sleep apnea in his father; parkinson disease in his father.    Current Outpatient Medications:     albuterol 90 mcg/actuation inhaler, Inhale 1-2 puffs every 4-6 hours as needed, Disp: 18 g, Rfl: 11    azelastine (Astelin) 137 mcg (0.1 %) nasal spray, Administer 2 sprays into each nostril 2 times a day as needed for rhinitis., Disp: 30 mL, Rfl: 2    buPROPion XL (Wellbutrin XL) 300 mg 24 hr tablet, Take 1 tablet (300 mg) by mouth once daily in the morning. Take 1 tablet daily with food in AM, Disp: 90 tablet, Rfl: 3    cholecalciferol (Vitamin D-3) 50 mcg (2,000 unit) capsule, Take 1 capsule (50 mcg) by mouth once daily., Disp: , Rfl:     cyanocobalamin (Vitamin B-12) 1,000 mcg tablet, Take 1 tablet (1,000 mcg) by mouth once daily., Disp: , Rfl:     diclofenac sodium 1 % kit, Apply to right knee 2 grms twice daily, Disp: , Rfl:     escitalopram (Lexapro) 5 mg tablet, Take 1 tablet (5 mg) by mouth once daily., Disp: 90 tablet, Rfl: 1    esomeprazole (NexIUM) 40 mg DR capsule, Take 1 capsule (40 mg) by mouth 2 times a day. Take 1/2 hour before breakfast or lunch,and take at bedtime . Must be  from thyroid med by at least  one hour., Disp: 180 capsule, Rfl: 1    ferrous sulfate 325 (65 Fe) MG tablet, Take by mouth. Take 1 tablet daily with food three days per week with supper. Do not take 3 hours of thyroid pill, Disp: , Rfl:     fluticasone (Flonase) 50 mcg/actuation nasal spray, Administer 2 sprays into each nostril once daily. Shake gently. Before first use, prime pump. After use, clean tip and replace cap., Disp: 48 g, Rfl: 3    fluticasone furoate (Arnuity Ellipta) 100 mcg/actuation inhaler, Inhale 1 puff once daily. Rinse mouth with water after use to reduce aftertaste and incidence of candidiasis. Do not swallow., Disp: 1 each, Rfl: 11    glucosamine HCl 750 mg  "tablet, Take 2 tablets by mouth once daily., Disp: , Rfl:     levothyroxine (Synthroid, Levoxyl) 75 mcg tablet, Take 1 tablet (75 mcg) by mouth once daily in the morning. Take before meals. Take by itself, on an empty stomach and nothing but water for one hour after., Disp: 90 tablet, Rfl: 3    LORazepam (Ativan) 0.5 mg tablet, Take 1 tablet (0.5 mg) by mouth once daily as needed for anxiety for up to 10 days. 1 tab daily if needed for stressful situations, Disp: 10 tablet, Rfl: 0    magnesium oxide (Mag-Ox) 250 mg magnesium tablet, Take 1 tablet (250 mg) by mouth 2 times a day. Avoid taking close to iron, Disp: 180 tablet, Rfl: 2    multivitamin tablet, Take 1 tablet by mouth once daily., Disp: , Rfl:     polyethylene glycol (Glycolax) 17 gram/dose powder, Take by mouth once daily. Mix 1 packet in 8 ounces of liquid and drink once daily, Disp: , Rfl:     psyllium (Metamucil) powder, Take by mouth. Use as directed, Disp: , Rfl:     rosuvastatin (Crestor) 40 mg tablet, Take 1 tablet (40 mg) by mouth once daily., Disp: 90 tablet, Rfl: 3    tamsulosin (Flomax) 0.4 mg 24 hr capsule, Take 1 capsule (0.4 mg) by mouth 2 times a day., Disp: 180 capsule, Rfl: 2    valsartan (Diovan) 320 mg tablet, Take 1 tablet (320 mg) by mouth once daily., Disp: 90 tablet, Rfl: 3    predniSONE (Deltasone) 20 mg tablet, 2 tabs daily for 5 days then one tab daily for 2 days then stop. Take in the am or lunch with food. (Patient not taking: Reported on 6/17/2025), Disp: 12 tablet, Rfl: 8  Allergies   Allergen Reactions    Codeine Unknown     BP (!) 146/105 (BP Location: Right arm, Patient Position: Sitting)   Pulse 89   Temp 36.4 °C (97.5 °F)   Resp 14   Ht 1.854 m (6' 1\")   Wt 111 kg (245 lb)   BMI 32.32 kg/m²   Neurological Exam/Physical Exam:    Constitutional: General appearance: no acute distress. Pleasant.   Auscultation of Heart: Regular rate and rhythm, no murmurs, normal S1 and S2.   Carotid Arteries: no bruits  Peripheral " Vascular Exam: No swelling in extremities  Mental status: no distress, alert, interactive and cooperative. Affect is appropriate.   Attention: normal attention  Language: normal comprehension   Fund of knowledge: Patient displays adequate knowledge of current events.  Eyes: The ophthalmoscopic examination was normal.   Cranial nerve II: Visual fields full to confrontation.   Cranial nerves III, IV, and VI: Pupils round, equally reactive to light; EOMs intact. No nystagmus.   Cranial Nerve V: Facial sensation intact to LT bilaterally.   Cranial nerve VII: no facial droop  Cranial nerve VIII: Hearing is intact  Cranial nerves IX and X: Palate elevates symmetrically.   Cranial nerve XI: Shoulder shrug intact.   Cranial nerve XII: Tongue is midline.  Motor:  Strength is normal.  He has stitches in the left hand at his wrist as well as his third MCP trigger finger that he had 2 weeks ago.  He has a mild postural tremor and a mild kinetic tremor.  Spirals are mildly affected.  Handwriting is mostly normal.  There is no bradykinesia or rigidity.  Appears to have arthritic joints in hands.  Deep Tendon Reflexes: left biceps 2+ , right biceps 2+, left triceps 2+, right triceps 2+, left brachioradialis 2+, right brachioradialis 2+, left patella 2+, right patella 2+, left ankle jerk 2+, right ankle jerk 2+   Sensory Exam: Normal to vibratory sensation  Coordination:  no limb dystaxia  Gait: Normal gait       Labs:  CBC:   Lab Results   Component Value Date    WBC 6.6 06/11/2025    HGB 13.7 06/11/2025    HCT 41.1 06/11/2025     06/11/2025     BMP:   Lab Results   Component Value Date     06/11/2025    K 4.4 06/11/2025     06/11/2025    CO2 27 06/11/2025    BUN 11 06/11/2025    CREATININE 0.93 06/11/2025    CALCIUM 9.8 06/11/2025    PHOS 3.4 11/15/2020     LFT:   Lab Results   Component Value Date    ALKPHOS 97 12/06/2023    BILITOT 0.7 12/06/2023    BILIDIR 0.1 04/13/2020    PROT 7.0 06/11/2025    ALBUMIN 4.5  12/06/2023    ALT 20 04/11/2025    AST 20 04/11/2025       Labs were viewed personally.   Kidney function normal.  CBC shows no signs of anemia.  Electrolytes normal.  LFTS are also unremarkable.     .    Assessment/Plan   Problem List Items Addressed This Visit    None      His tremor is likely multifactorial.  We talked about reducing caffeine as well as possibly tapering off Wellbutrin and watching tremor.  Wellbutrin is known to cause tremors.   Will ask PCP if this is an option to wean off.  ( After 2-3 weeks would notice if tremor is effect)    It is also possible he could have essential tremor which we discussed today.  Alcohol may be contributing.     I will send a note to his PCP about weaning off Wellbutrin and giving that at least a month to see what his tremors like.  Also alcohol may be contributing as well.    We could trial propranolol if the above is not helping and tremor is bothersome.

## 2025-06-17 NOTE — PATIENT INSTRUCTIONS
"It was a pleasure seeing you today.     Please make a follow up appointment in 6 months.  You may also schedule a phone or virtual visit sooner on a Friday morning with me as needed before the next clinic appointment.     For any urgent issues or needing to speak to a medical assistant please call [phone], option 6 during our office hours Monday-Friday 8am-4pm, and leave a voicemail with your concern.  My office will try to reach back you as soon as possible within 24 (business) hours.  If you have an emergency please call 911 or visit a local urgent care or nearest emergency room.      Please understand that [facility] is a useful communication tool for simple \"normal\" results or a refill request but I would not recommend using this tool for emergent or urgent issues or for conversations with me.  I am happy to ask my staff to rearrange a follow up visit or a virtual visit sooner than requested if appropriate for your care.    "

## 2025-06-18 DIAGNOSIS — G47.33 OBSTRUCTIVE SLEEP APNEA: ICD-10-CM

## 2025-06-18 RX ORDER — TALC
250 POWDER (GRAM) TOPICAL 2 TIMES DAILY
Qty: 180 TABLET | Refills: 0 | Status: SHIPPED | OUTPATIENT
Start: 2025-06-18

## 2025-06-19 ENCOUNTER — TELEPHONE (OUTPATIENT)
Dept: GENETICS | Facility: CLINIC | Age: 65
End: 2025-06-19

## 2025-06-24 ENCOUNTER — APPOINTMENT (OUTPATIENT)
Dept: PRIMARY CARE | Facility: CLINIC | Age: 65
End: 2025-06-24
Payer: COMMERCIAL

## 2025-06-24 VITALS
SYSTOLIC BLOOD PRESSURE: 115 MMHG | BODY MASS INDEX: 32.51 KG/M2 | DIASTOLIC BLOOD PRESSURE: 72 MMHG | HEIGHT: 72 IN | HEART RATE: 82 BPM | RESPIRATION RATE: 18 BRPM | WEIGHT: 240 LBS

## 2025-06-24 DIAGNOSIS — M25.442 SWELLING OF FINGER JOINT OF LEFT HAND: ICD-10-CM

## 2025-06-24 DIAGNOSIS — Z51.81 MEDICATION MONITORING ENCOUNTER: ICD-10-CM

## 2025-06-24 DIAGNOSIS — Z00.00 ENCOUNTER FOR ANNUAL PHYSICAL EXAM: Primary | ICD-10-CM

## 2025-06-24 DIAGNOSIS — K21.9 GASTROESOPHAGEAL REFLUX DISEASE, UNSPECIFIED WHETHER ESOPHAGITIS PRESENT: ICD-10-CM

## 2025-06-24 DIAGNOSIS — R39.14 BENIGN PROSTATIC HYPERPLASIA WITH INCOMPLETE BLADDER EMPTYING: ICD-10-CM

## 2025-06-24 DIAGNOSIS — E85.9 AMYLOIDOSIS, UNSPECIFIED TYPE (MULTI): ICD-10-CM

## 2025-06-24 DIAGNOSIS — J45.30 MILD PERSISTENT ASTHMA WITHOUT COMPLICATION (HHS-HCC): ICD-10-CM

## 2025-06-24 DIAGNOSIS — F33.0 MILD EPISODE OF RECURRENT MAJOR DEPRESSIVE DISORDER: ICD-10-CM

## 2025-06-24 DIAGNOSIS — N40.1 BENIGN PROSTATIC HYPERPLASIA WITH INCOMPLETE BLADDER EMPTYING: ICD-10-CM

## 2025-06-24 DIAGNOSIS — M25.542 METACARPOPHALANGEAL JOINT PAIN OF LEFT HAND: ICD-10-CM

## 2025-06-24 DIAGNOSIS — E72.12 HOMOZYGOUS FOR C677T POLYMORPHISM OF MTHFR (MULTI): ICD-10-CM

## 2025-06-24 DIAGNOSIS — M25.649 MORNING JOINT STIFFNESS OF HAND, UNSPECIFIED LATERALITY: ICD-10-CM

## 2025-06-24 DIAGNOSIS — E72.10 DISORDER OF SULFUR-BEARING AMINO ACID METABOLISM (MULTI): ICD-10-CM

## 2025-06-24 DIAGNOSIS — F41.1 GENERALIZED ANXIETY DISORDER: ICD-10-CM

## 2025-06-24 DIAGNOSIS — E03.9 HYPOTHYROIDISM, UNSPECIFIED TYPE: ICD-10-CM

## 2025-06-24 DIAGNOSIS — I71.21 ANEURYSM OF ASCENDING AORTA WITHOUT RUPTURE: ICD-10-CM

## 2025-06-24 DIAGNOSIS — I10 BENIGN ESSENTIAL HYPERTENSION: ICD-10-CM

## 2025-06-24 DIAGNOSIS — G25.2 INTENTION TREMOR: ICD-10-CM

## 2025-06-24 DIAGNOSIS — R82.90 ABNORMAL RESULT ON SCREENING URINE TEST: ICD-10-CM

## 2025-06-24 DIAGNOSIS — G47.33 OBSTRUCTIVE SLEEP APNEA: ICD-10-CM

## 2025-06-24 DIAGNOSIS — E78.00 HYPERCHOLESTEROLEMIA: ICD-10-CM

## 2025-06-24 DIAGNOSIS — M65.331 TRIGGER FINGER, RIGHT MIDDLE FINGER: ICD-10-CM

## 2025-06-24 PROCEDURE — 1125F AMNT PAIN NOTED PAIN PRSNT: CPT | Performed by: INTERNAL MEDICINE

## 2025-06-24 PROCEDURE — 99397 PER PM REEVAL EST PAT 65+ YR: CPT | Performed by: INTERNAL MEDICINE

## 2025-06-24 PROCEDURE — 1159F MED LIST DOCD IN RCRD: CPT | Performed by: INTERNAL MEDICINE

## 2025-06-24 PROCEDURE — 1160F RVW MEDS BY RX/DR IN RCRD: CPT | Performed by: INTERNAL MEDICINE

## 2025-06-24 PROCEDURE — 3008F BODY MASS INDEX DOCD: CPT | Performed by: INTERNAL MEDICINE

## 2025-06-24 PROCEDURE — 99214 OFFICE O/P EST MOD 30 MIN: CPT | Performed by: INTERNAL MEDICINE

## 2025-06-24 PROCEDURE — 3078F DIAST BP <80 MM HG: CPT | Performed by: INTERNAL MEDICINE

## 2025-06-24 PROCEDURE — 1170F FXNL STATUS ASSESSED: CPT | Performed by: INTERNAL MEDICINE

## 2025-06-24 PROCEDURE — 1036F TOBACCO NON-USER: CPT | Performed by: INTERNAL MEDICINE

## 2025-06-24 PROCEDURE — 3074F SYST BP LT 130 MM HG: CPT | Performed by: INTERNAL MEDICINE

## 2025-06-24 RX ORDER — AZELASTINE 1 MG/ML
2 SPRAY, METERED NASAL 2 TIMES DAILY PRN
Qty: 30 ML | Refills: 11 | Status: SHIPPED | OUTPATIENT
Start: 2025-06-24

## 2025-06-24 RX ORDER — BUPROPION HYDROCHLORIDE 150 MG/1
150 TABLET ORAL EVERY MORNING
Qty: 30 TABLET | Refills: 1 | Status: SHIPPED | OUTPATIENT
Start: 2025-06-24 | End: 2025-08-23

## 2025-06-24 RX ORDER — EZETIMIBE 10 MG/1
10 TABLET ORAL DAILY
Qty: 90 TABLET | Refills: 3 | Status: SHIPPED | OUTPATIENT
Start: 2025-06-24

## 2025-06-24 RX ORDER — MELOXICAM 7.5 MG/1
7.5 TABLET ORAL DAILY
Qty: 30 TABLET | Refills: 0 | Status: SHIPPED | OUTPATIENT
Start: 2025-06-24

## 2025-06-24 SDOH — ECONOMIC STABILITY: FOOD INSECURITY: WITHIN THE PAST 12 MONTHS, YOU WORRIED THAT YOUR FOOD WOULD RUN OUT BEFORE YOU GOT MONEY TO BUY MORE.: NEVER TRUE

## 2025-06-24 SDOH — HEALTH STABILITY: PHYSICAL HEALTH: ON AVERAGE, HOW MANY DAYS PER WEEK DO YOU ENGAGE IN MODERATE TO STRENUOUS EXERCISE (LIKE A BRISK WALK)?: 7 DAYS

## 2025-06-24 SDOH — ECONOMIC STABILITY: FOOD INSECURITY: WITHIN THE PAST 12 MONTHS, THE FOOD YOU BOUGHT JUST DIDN'T LAST AND YOU DIDN'T HAVE MONEY TO GET MORE.: NEVER TRUE

## 2025-06-24 SDOH — HEALTH STABILITY: PHYSICAL HEALTH: ON AVERAGE, HOW MANY MINUTES DO YOU ENGAGE IN EXERCISE AT THIS LEVEL?: 90 MIN

## 2025-06-24 SDOH — ECONOMIC STABILITY: GENERAL
WHICH OF THE FOLLOWING DO YOU KNOW HOW TO USE AND HAVE ACCESS TO EVERY DAY? (CHOOSE ALL THAT APPLY): DESKTOP COMPUTER, LAPTOP COMPUTER, OR TABLET WITH BROADBAND INTERNET CONNECTION;SMARTPHONE WITH CELLULAR DATA PLAN

## 2025-06-24 ASSESSMENT — ANXIETY QUESTIONNAIRES
7. FEELING AFRAID AS IF SOMETHING AWFUL MIGHT HAPPEN: NOT AT ALL
5. BEING SO RESTLESS THAT IT IS HARD TO SIT STILL: NOT AT ALL
3. WORRYING TOO MUCH ABOUT DIFFERENT THINGS: NOT AT ALL
2. NOT BEING ABLE TO STOP OR CONTROL WORRYING: SEVERAL DAYS
GAD7 TOTAL SCORE: 2
4. TROUBLE RELAXING: NOT AT ALL
6. BECOMING EASILY ANNOYED OR IRRITABLE: NOT AT ALL
IF YOU CHECKED OFF ANY PROBLEMS ON THIS QUESTIONNAIRE, HOW DIFFICULT HAVE THESE PROBLEMS MADE IT FOR YOU TO DO YOUR WORK, TAKE CARE OF THINGS AT HOME, OR GET ALONG WITH OTHER PEOPLE: NOT DIFFICULT AT ALL
1. FEELING NERVOUS, ANXIOUS, OR ON EDGE: SEVERAL DAYS

## 2025-06-24 ASSESSMENT — ACTIVITIES OF DAILY LIVING (ADL)
JUDGMENT_ADEQUATE_SAFELY_COMPLETE_DAILY_ACTIVITIES: YES
EATING: INDEPENDENT
GROOMING: INDEPENDENT
PREPARING MEALS: INDEPENDENT
HEARING - RIGHT EAR: FUNCTIONAL
TOILETING: INDEPENDENT
MANAGING FINANCES: INDEPENDENT
GROCERY SHOPPING: INDEPENDENT
ADEQUATE_TO_COMPLETE_ADL: NO
PILL BOX USED: YES
NEEDS ASSISTANCE WITH FOOD: INDEPENDENT
DRESSING YOURSELF: INDEPENDENT
HEARING - LEFT EAR: FUNCTIONAL
STIL DRIVING: YES
USING TELEPHONE: INDEPENDENT
TAKING MEDICATION: INDEPENDENT
USING TRANSPORTATION: INDEPENDENT
BATHING: INDEPENDENT
PATIENT'S MEMORY ADEQUATE TO SAFELY COMPLETE DAILY ACTIVITIES?: YES
WALKS IN HOME: INDEPENDENT
FEEDING YOURSELF: INDEPENDENT
DOING HOUSEWORK: INDEPENDENT

## 2025-06-24 ASSESSMENT — SOCIAL DETERMINANTS OF HEALTH (SDOH)
WITHIN THE LAST YEAR, HAVE YOU BEEN HUMILIATED OR EMOTIONALLY ABUSED IN OTHER WAYS BY YOUR PARTNER OR EX-PARTNER?: NO
HOW OFTEN DO YOU ATTENT MEETINGS OF THE CLUB OR ORGANIZATION YOU BELONG TO?: MORE THAN 4 TIMES PER YEAR
HOW HARD IS IT FOR YOU TO PAY FOR THE VERY BASICS LIKE FOOD, HOUSING, MEDICAL CARE, AND HEATING?: NOT HARD AT ALL
HOW OFTEN DO YOU GET TOGETHER WITH FRIENDS OR RELATIVES?: MORE THAN THREE TIMES A WEEK
IN A TYPICAL WEEK, HOW MANY TIMES DO YOU TALK ON THE PHONE WITH FAMILY, FRIENDS, OR NEIGHBORS?: MORE THAN THREE TIMES A WEEK
WITHIN THE LAST YEAR, HAVE TO BEEN RAPED OR FORCED TO HAVE ANY KIND OF SEXUAL ACTIVITY BY YOUR PARTNER OR EX-PARTNER?: NO
WITHIN THE LAST YEAR, HAVE YOU BEEN KICKED, HIT, SLAPPED, OR OTHERWISE PHYSICALLY HURT BY YOUR PARTNER OR EX-PARTNER?: NO
DO YOU BELONG TO ANY CLUBS OR ORGANIZATIONS SUCH AS CHURCH GROUPS UNIONS, FRATERNAL OR ATHLETIC GROUPS, OR SCHOOL GROUPS?: YES
IN THE PAST 12 MONTHS, HAS THE ELECTRIC, GAS, OIL, OR WATER COMPANY THREATENED TO SHUT OFF SERVICE IN YOUR HOME?: NO
WITHIN THE LAST YEAR, HAVE YOU BEEN AFRAID OF YOUR PARTNER OR EX-PARTNER?: NO
HOW OFTEN DO YOU ATTEND CHURCH OR RELIGIOUS SERVICES?: MORE THAN 4 TIMES PER YEAR

## 2025-06-24 ASSESSMENT — PATIENT HEALTH QUESTIONNAIRE - PHQ9
1. LITTLE INTEREST OR PLEASURE IN DOING THINGS: NOT AT ALL
2. FEELING DOWN, DEPRESSED OR HOPELESS: NOT AT ALL
1. LITTLE INTEREST OR PLEASURE IN DOING THINGS: NOT AT ALL
SUM OF ALL RESPONSES TO PHQ9 QUESTIONS 1 & 2: 0
SUM OF ALL RESPONSES TO PHQ9 QUESTIONS 1 AND 2: 0
2. FEELING DOWN, DEPRESSED OR HOPELESS: NOT AT ALL

## 2025-06-24 ASSESSMENT — LIFESTYLE VARIABLES
SKIP TO QUESTIONS 9-10: 0
HOW OFTEN DO YOU HAVE SIX OR MORE DRINKS ON ONE OCCASION: LESS THAN MONTHLY
AUDIT-C TOTAL SCORE: 5
HOW MANY STANDARD DRINKS CONTAINING ALCOHOL DO YOU HAVE ON A TYPICAL DAY: 5 OR 6
HOW OFTEN DO YOU HAVE A DRINK CONTAINING ALCOHOL: 2-4 TIMES A MONTH

## 2025-06-24 ASSESSMENT — PAIN SCALES - GENERAL: PAINLEVEL_OUTOF10: 4

## 2025-06-24 NOTE — PATIENT INSTRUCTIONS
Start Zetia 10 mg once per day -adding this to Rosuvastatin with goal of LDL cholesterol being under 70.  Fasting blood test to check on effectiveness of this and your excellent low cholesterol diet, at least 2 days before your sept 10 appt.    Will also check a couple of arthritis tests around that time. One arthritis done recently, c reactive protein, was normal.    Urine test at the lab preferably sooner than September, see printed orders at the desk.  Hands  Do not take aleve or ibuprofen over the counter.  Good  gloves and tools, such as oxo good .  Contrast water baths. Consider soft brace left hand.    Meloxicam 7.5 mg one tab daily with food for 15 days then stop. If it helps the hands but swelling returns can restart and finish the prescription.    Tremor: July 15 Change bupropion XL to 150 mg once per day for 30 days. If tremor is gone at this dose, we can continue the 150 mg dose, you have one refill. If tremor still present August 15, stop the bupropion and see if it is gone in 3 weeks. If tremor is not affected by this change, bupropion is not the cause. Pay attention to what tremor is like before making the change.

## 2025-06-26 ENCOUNTER — APPOINTMENT (OUTPATIENT)
Dept: NEUROLOGY | Facility: CLINIC | Age: 65
End: 2025-06-26
Payer: COMMERCIAL

## 2025-07-13 DIAGNOSIS — Z51.81 ENCOUNTER FOR MONITORING STATIN THERAPY: ICD-10-CM

## 2025-07-13 DIAGNOSIS — Z51.81 MEDICATION MONITORING ENCOUNTER: ICD-10-CM

## 2025-07-13 DIAGNOSIS — Z79.899 ENCOUNTER FOR MONITORING STATIN THERAPY: ICD-10-CM

## 2025-07-13 DIAGNOSIS — K76.0 FATTY LIVER: ICD-10-CM

## 2025-07-13 DIAGNOSIS — E03.9 HYPOTHYROIDISM, UNSPECIFIED TYPE: ICD-10-CM

## 2025-09-02 ENCOUNTER — OFFICE VISIT (OUTPATIENT)
Dept: SLEEP MEDICINE | Facility: CLINIC | Age: 65
End: 2025-09-02
Payer: COMMERCIAL

## 2025-09-02 VITALS
DIASTOLIC BLOOD PRESSURE: 89 MMHG | HEIGHT: 72 IN | OXYGEN SATURATION: 98 % | SYSTOLIC BLOOD PRESSURE: 144 MMHG | BODY MASS INDEX: 32.55 KG/M2 | HEART RATE: 79 BPM

## 2025-09-02 DIAGNOSIS — G47.33 OSA (OBSTRUCTIVE SLEEP APNEA): Primary | ICD-10-CM

## 2025-09-02 PROCEDURE — 99214 OFFICE O/P EST MOD 30 MIN: CPT | Performed by: NURSE PRACTITIONER

## 2025-09-02 PROCEDURE — 3077F SYST BP >= 140 MM HG: CPT | Performed by: NURSE PRACTITIONER

## 2025-09-02 PROCEDURE — 3079F DIAST BP 80-89 MM HG: CPT | Performed by: NURSE PRACTITIONER

## 2025-09-02 PROCEDURE — 1036F TOBACCO NON-USER: CPT | Performed by: NURSE PRACTITIONER

## 2025-09-02 PROCEDURE — 1159F MED LIST DOCD IN RCRD: CPT | Performed by: NURSE PRACTITIONER

## 2025-09-02 PROCEDURE — 1160F RVW MEDS BY RX/DR IN RCRD: CPT | Performed by: NURSE PRACTITIONER

## 2025-09-10 ENCOUNTER — APPOINTMENT (OUTPATIENT)
Dept: PRIMARY CARE | Facility: CLINIC | Age: 65
End: 2025-09-10
Payer: COMMERCIAL

## 2025-12-16 ENCOUNTER — APPOINTMENT (OUTPATIENT)
Dept: NEUROLOGY | Facility: CLINIC | Age: 65
End: 2025-12-16
Payer: COMMERCIAL

## 2026-01-13 ENCOUNTER — APPOINTMENT (OUTPATIENT)
Dept: PRIMARY CARE | Facility: CLINIC | Age: 66
End: 2026-01-13
Payer: COMMERCIAL

## 2026-06-24 ENCOUNTER — APPOINTMENT (OUTPATIENT)
Dept: PRIMARY CARE | Facility: CLINIC | Age: 66
End: 2026-06-24
Payer: COMMERCIAL